# Patient Record
Sex: MALE | Race: WHITE | NOT HISPANIC OR LATINO | Employment: OTHER | URBAN - METROPOLITAN AREA
[De-identification: names, ages, dates, MRNs, and addresses within clinical notes are randomized per-mention and may not be internally consistent; named-entity substitution may affect disease eponyms.]

---

## 2018-02-16 ENCOUNTER — OFFICE VISIT CONVERTED (OUTPATIENT)
Dept: UROLOGY | Facility: CLINIC | Age: 58
End: 2018-02-16
Attending: UROLOGY

## 2018-02-16 ENCOUNTER — CONVERSION ENCOUNTER (OUTPATIENT)
Dept: SURGERY | Facility: CLINIC | Age: 58
End: 2018-02-16

## 2018-07-11 ENCOUNTER — OFFICE VISIT CONVERTED (OUTPATIENT)
Dept: CARDIOLOGY | Facility: CLINIC | Age: 58
End: 2018-07-11
Attending: INTERNAL MEDICINE

## 2019-02-15 ENCOUNTER — HOSPITAL ENCOUNTER (OUTPATIENT)
Dept: LAB | Facility: HOSPITAL | Age: 59
Discharge: HOME | End: 2019-02-15

## 2019-02-15 LAB
ALBUMIN SERPL-MCNC: 4 G/DL (ref 3.5–5)
ALBUMIN/GLOB SERPL: 1.3 {RATIO} (ref 1.4–2.6)
ALP SERPL-CCNC: 96 U/L (ref 56–155)
ALT SERPL-CCNC: 26 U/L (ref 10–40)
ANION GAP SERPL CALC-SCNC: 15 MMOL/L (ref 8–19)
AST SERPL-CCNC: 26 U/L (ref 15–50)
BASOPHILS # BLD AUTO: 0.07 10*3/UL (ref 0–0.2)
BASOPHILS NFR BLD AUTO: 1 % (ref 0–3)
BILIRUB SERPL-MCNC: 0.4 MG/DL (ref 0.2–1.3)
BUN SERPL-MCNC: 11 MG/DL (ref 5–25)
BUN/CREAT SERPL: 13 {RATIO} (ref 6–20)
CALCIUM SERPL-MCNC: 9.5 MG/DL (ref 8.7–10.4)
CHLORIDE SERPL-SCNC: 98 MMOL/L (ref 99–111)
CHOLEST SERPL-MCNC: 155 MG/DL (ref 107–200)
CHOLEST/HDLC SERPL: 3.5 {RATIO} (ref 3–6)
CONV ABS IMM GRAN: 0.01 10*3/UL (ref 0–0.2)
CONV CO2: 29 MMOL/L (ref 22–32)
CONV IMMATURE GRAN: 0.1 % (ref 0–1.8)
CONV TOTAL PROTEIN: 7.2 G/DL (ref 6.3–8.2)
CREAT UR-MCNC: 0.87 MG/DL (ref 0.7–1.2)
DEPRECATED RDW RBC AUTO: 43.7 FL (ref 35.1–43.9)
EOSINOPHIL # BLD AUTO: 0.16 10*3/UL (ref 0–0.7)
EOSINOPHIL # BLD AUTO: 2.3 % (ref 0–7)
ERYTHROCYTE [DISTWIDTH] IN BLOOD BY AUTOMATED COUNT: 13.8 % (ref 11.6–14.4)
GFR SERPLBLD BASED ON 1.73 SQ M-ARVRAT: >60 ML/MIN/{1.73_M2}
GLOBULIN UR ELPH-MCNC: 3.2 G/DL (ref 2–3.5)
GLUCOSE SERPL-MCNC: 99 MG/DL (ref 70–99)
HBA1C MFR BLD: 13.9 G/DL (ref 14–18)
HCT VFR BLD AUTO: 43 % (ref 42–52)
HDLC SERPL-MCNC: 44 MG/DL (ref 40–60)
LDLC SERPL CALC-MCNC: 102 MG/DL (ref 70–100)
LYMPHOCYTES # BLD AUTO: 1.46 10*3/UL (ref 1–5)
MCH RBC QN AUTO: 28.1 PG (ref 27–31)
MCHC RBC AUTO-ENTMCNC: 32.3 G/DL (ref 33–37)
MCV RBC AUTO: 87 FL (ref 80–96)
MONOCYTES # BLD AUTO: 0.46 10*3/UL (ref 0.2–1.2)
MONOCYTES NFR BLD AUTO: 6.6 % (ref 3–10)
NEUTROPHILS # BLD AUTO: 4.84 10*3/UL (ref 2–8)
NEUTROPHILS NFR BLD AUTO: 69.1 % (ref 30–85)
NRBC CBCN: 0 % (ref 0–0.7)
OSMOLALITY SERPL CALC.SUM OF ELEC: 285 MOSM/KG (ref 273–304)
PLATELET # BLD AUTO: 228 10*3/UL (ref 130–400)
PMV BLD AUTO: 10 FL (ref 9.4–12.4)
POTASSIUM SERPL-SCNC: 4.4 MMOL/L (ref 3.5–5.3)
PSA SERPL-MCNC: 0.73 NG/ML (ref 0–4)
RBC # BLD AUTO: 4.94 10*6/UL (ref 4.7–6.1)
SODIUM SERPL-SCNC: 138 MMOL/L (ref 135–147)
T4 FREE SERPL-MCNC: 1.4 NG/DL (ref 0.9–1.8)
TRIGL SERPL-MCNC: 47 MG/DL (ref 40–150)
TSH SERPL-ACNC: 2.63 M[IU]/L (ref 0.27–4.2)
VARIANT LYMPHS NFR BLD MANUAL: 20.9 % (ref 20–45)
VLDLC SERPL-MCNC: 9 MG/DL (ref 5–37)
WBC # BLD AUTO: 7 10*3/UL (ref 4.8–10.8)

## 2019-02-22 ENCOUNTER — OFFICE VISIT CONVERTED (OUTPATIENT)
Dept: SURGERY | Facility: CLINIC | Age: 59
End: 2019-02-22
Attending: PHYSICIAN ASSISTANT

## 2019-07-11 ENCOUNTER — HOSPITAL ENCOUNTER (OUTPATIENT)
Dept: LAB | Facility: HOSPITAL | Age: 59
Discharge: HOME | End: 2019-07-11
Attending: INTERNAL MEDICINE

## 2019-07-11 LAB
ALBUMIN SERPL-MCNC: 3.8 G/DL (ref 3.5–5)
ALBUMIN/GLOB SERPL: 1.1 {RATIO} (ref 1.4–2.6)
ALP SERPL-CCNC: 106 U/L (ref 56–155)
ALT SERPL-CCNC: 27 U/L (ref 10–40)
ANION GAP SERPL CALC-SCNC: 19 MMOL/L (ref 8–19)
AST SERPL-CCNC: 24 U/L (ref 15–50)
BILIRUB SERPL-MCNC: 0.45 MG/DL (ref 0.2–1.3)
BUN SERPL-MCNC: 13 MG/DL (ref 5–25)
BUN/CREAT SERPL: 14 {RATIO} (ref 6–20)
CALCIUM SERPL-MCNC: 9.4 MG/DL (ref 8.7–10.4)
CHLORIDE SERPL-SCNC: 100 MMOL/L (ref 99–111)
CHOLEST SERPL-MCNC: 138 MG/DL (ref 107–200)
CHOLEST/HDLC SERPL: 3.7 {RATIO} (ref 3–6)
CONV CO2: 27 MMOL/L (ref 22–32)
CONV TOTAL PROTEIN: 7.3 G/DL (ref 6.3–8.2)
CREAT UR-MCNC: 0.96 MG/DL (ref 0.7–1.2)
GFR SERPLBLD BASED ON 1.73 SQ M-ARVRAT: >60 ML/MIN/{1.73_M2}
GLOBULIN UR ELPH-MCNC: 3.5 G/DL (ref 2–3.5)
GLUCOSE SERPL-MCNC: 96 MG/DL (ref 70–99)
HDLC SERPL-MCNC: 37 MG/DL (ref 40–60)
LDLC SERPL CALC-MCNC: 90 MG/DL (ref 70–100)
OSMOLALITY SERPL CALC.SUM OF ELEC: 294 MOSM/KG (ref 273–304)
POTASSIUM SERPL-SCNC: 4.2 MMOL/L (ref 3.5–5.3)
SODIUM SERPL-SCNC: 142 MMOL/L (ref 135–147)
TRIGL SERPL-MCNC: 56 MG/DL (ref 40–150)
VLDLC SERPL-MCNC: 11 MG/DL (ref 5–37)

## 2019-07-15 ENCOUNTER — OFFICE VISIT CONVERTED (OUTPATIENT)
Dept: CARDIOLOGY | Facility: CLINIC | Age: 59
End: 2019-07-15
Attending: NURSE PRACTITIONER

## 2019-08-29 ENCOUNTER — HOSPITAL ENCOUNTER (OUTPATIENT)
Dept: RADIATION ONCOLOGY | Facility: HOSPITAL | Age: 59
Discharge: HOME | End: 2019-08-29
Attending: RADIOLOGY

## 2019-08-29 LAB — PSA SERPL-MCNC: 0.84 NG/ML (ref 0–4)

## 2019-09-16 ENCOUNTER — HOSPITAL ENCOUNTER (OUTPATIENT)
Dept: LAB | Facility: HOSPITAL | Age: 59
Discharge: HOME | End: 2019-09-16

## 2019-09-16 LAB
T4 FREE SERPL-MCNC: 1.3 NG/DL (ref 0.9–1.8)
TSH SERPL-ACNC: 2.82 M[IU]/L (ref 0.27–4.2)

## 2019-12-13 ENCOUNTER — HOSPITAL ENCOUNTER (OUTPATIENT)
Dept: LAB | Facility: HOSPITAL | Age: 59
Discharge: HOME | End: 2019-12-13

## 2019-12-13 LAB
ALT SERPL-CCNC: 26 U/L (ref 10–40)
ANION GAP SERPL CALC-SCNC: 16 MMOL/L (ref 8–19)
AST SERPL-CCNC: 30 U/L (ref 15–50)
BUN SERPL-MCNC: 13 MG/DL (ref 5–25)
BUN/CREAT SERPL: 16 {RATIO} (ref 6–20)
CALCIUM SERPL-MCNC: 9.3 MG/DL (ref 8.7–10.4)
CHLORIDE SERPL-SCNC: 96 MMOL/L (ref 99–111)
CHOLEST SERPL-MCNC: 142 MG/DL (ref 107–200)
CHOLEST/HDLC SERPL: 2.9 {RATIO} (ref 3–6)
CONV CO2: 28 MMOL/L (ref 22–32)
CREAT UR-MCNC: 0.83 MG/DL (ref 0.7–1.2)
GFR SERPLBLD BASED ON 1.73 SQ M-ARVRAT: >60 ML/MIN/{1.73_M2}
GLUCOSE SERPL-MCNC: 88 MG/DL (ref 70–99)
HDLC SERPL-MCNC: 49 MG/DL (ref 40–60)
LDLC SERPL CALC-MCNC: 86 MG/DL (ref 70–100)
OSMOLALITY SERPL CALC.SUM OF ELEC: 282 MOSM/KG (ref 273–304)
POTASSIUM SERPL-SCNC: 3.9 MMOL/L (ref 3.5–5.3)
SODIUM SERPL-SCNC: 136 MMOL/L (ref 135–147)
T4 FREE SERPL-MCNC: 1.4 NG/DL (ref 0.9–1.8)
TRIGL SERPL-MCNC: 37 MG/DL (ref 40–150)
TSH SERPL-ACNC: 3.11 M[IU]/L (ref 0.27–4.2)
VLDLC SERPL-MCNC: 7 MG/DL (ref 5–37)

## 2020-01-13 ENCOUNTER — HOSPITAL ENCOUNTER (OUTPATIENT)
Dept: LAB | Facility: HOSPITAL | Age: 60
Discharge: HOME | End: 2020-01-13
Attending: RADIOLOGY

## 2020-01-13 LAB — PSA SERPL-MCNC: 0.86 NG/ML (ref 0–4)

## 2020-02-28 ENCOUNTER — OFFICE VISIT CONVERTED (OUTPATIENT)
Dept: SURGERY | Facility: CLINIC | Age: 60
End: 2020-02-28
Attending: PHYSICIAN ASSISTANT

## 2020-02-28 ENCOUNTER — CONVERSION ENCOUNTER (OUTPATIENT)
Dept: SURGERY | Facility: CLINIC | Age: 60
End: 2020-02-28

## 2020-06-04 ENCOUNTER — OFFICE VISIT CONVERTED (OUTPATIENT)
Dept: FAMILY MEDICINE CLINIC | Facility: CLINIC | Age: 60
End: 2020-06-04
Attending: FAMILY MEDICINE

## 2020-08-10 ENCOUNTER — HOSPITAL ENCOUNTER (OUTPATIENT)
Dept: LAB | Facility: HOSPITAL | Age: 60
Discharge: HOME | End: 2020-08-10
Attending: NURSE PRACTITIONER

## 2020-08-10 LAB
25(OH)D3 SERPL-MCNC: 33.2 NG/ML (ref 30–100)
ALBUMIN SERPL-MCNC: 3.7 G/DL (ref 3.5–5)
ALBUMIN/GLOB SERPL: 1.1 {RATIO} (ref 1.4–2.6)
ALP SERPL-CCNC: 109 U/L (ref 56–155)
ALT SERPL-CCNC: 29 U/L (ref 10–40)
ANION GAP SERPL CALC-SCNC: 23 MMOL/L (ref 8–19)
AST SERPL-CCNC: 30 U/L (ref 15–50)
BASOPHILS # BLD AUTO: 0.07 10*3/UL (ref 0–0.2)
BASOPHILS NFR BLD AUTO: 1 % (ref 0–3)
BILIRUB SERPL-MCNC: 0.37 MG/DL (ref 0.2–1.3)
BUN SERPL-MCNC: 11 MG/DL (ref 5–25)
BUN/CREAT SERPL: 11 {RATIO} (ref 6–20)
CALCIUM SERPL-MCNC: 10 MG/DL (ref 8.7–10.4)
CHLORIDE SERPL-SCNC: 100 MMOL/L (ref 99–111)
CHOLEST SERPL-MCNC: 147 MG/DL (ref 107–200)
CHOLEST/HDLC SERPL: 4 {RATIO} (ref 3–6)
CONV ABS IMM GRAN: 0.01 10*3/UL (ref 0–0.2)
CONV CO2: 22 MMOL/L (ref 22–32)
CONV IMMATURE GRAN: 0.1 % (ref 0–1.8)
CONV TOTAL PROTEIN: 7.2 G/DL (ref 6.3–8.2)
CREAT UR-MCNC: 0.96 MG/DL (ref 0.7–1.2)
DEPRECATED RDW RBC AUTO: 45.4 FL (ref 35.1–43.9)
EOSINOPHIL # BLD AUTO: 0.41 10*3/UL (ref 0–0.7)
EOSINOPHIL # BLD AUTO: 6.1 % (ref 0–7)
ERYTHROCYTE [DISTWIDTH] IN BLOOD BY AUTOMATED COUNT: 14.3 % (ref 11.6–14.4)
FOLATE SERPL-MCNC: 17.5 NG/ML (ref 4.8–20)
GFR SERPLBLD BASED ON 1.73 SQ M-ARVRAT: >60 ML/MIN/{1.73_M2}
GLOBULIN UR ELPH-MCNC: 3.5 G/DL (ref 2–3.5)
GLUCOSE SERPL-MCNC: 95 MG/DL (ref 70–99)
HCT VFR BLD AUTO: 43.7 % (ref 42–52)
HDLC SERPL-MCNC: 37 MG/DL (ref 40–60)
HGB BLD-MCNC: 13.6 G/DL (ref 14–18)
IRON SATN MFR SERPL: 15 % (ref 20–55)
IRON SERPL-MCNC: 41 UG/DL (ref 70–180)
LDLC SERPL CALC-MCNC: 99 MG/DL (ref 70–100)
LYMPHOCYTES # BLD AUTO: 1.55 10*3/UL (ref 1–5)
LYMPHOCYTES NFR BLD AUTO: 23.2 % (ref 20–45)
MCH RBC QN AUTO: 27.5 PG (ref 27–31)
MCHC RBC AUTO-ENTMCNC: 31.1 G/DL (ref 33–37)
MCV RBC AUTO: 88.5 FL (ref 80–96)
MONOCYTES # BLD AUTO: 0.51 10*3/UL (ref 0.2–1.2)
MONOCYTES NFR BLD AUTO: 7.6 % (ref 3–10)
NEUTROPHILS # BLD AUTO: 4.14 10*3/UL (ref 2–8)
NEUTROPHILS NFR BLD AUTO: 62 % (ref 30–85)
NRBC CBCN: 0 % (ref 0–0.7)
OSMOLALITY SERPL CALC.SUM OF ELEC: 291 MOSM/KG (ref 273–304)
PLATELET # BLD AUTO: 205 10*3/UL (ref 130–400)
PMV BLD AUTO: 11.3 FL (ref 9.4–12.4)
POTASSIUM SERPL-SCNC: 4.4 MMOL/L (ref 3.5–5.3)
RBC # BLD AUTO: 4.94 10*6/UL (ref 4.7–6.1)
SODIUM SERPL-SCNC: 141 MMOL/L (ref 135–147)
T4 FREE SERPL-MCNC: 1.3 NG/DL (ref 0.9–1.8)
TIBC SERPL-MCNC: 276 UG/DL (ref 245–450)
TRANSFERRIN SERPL-MCNC: 193 MG/DL (ref 215–365)
TRIGL SERPL-MCNC: 54 MG/DL (ref 40–150)
TSH SERPL-ACNC: 3.06 M[IU]/L (ref 0.27–4.2)
VIT B12 SERPL-MCNC: 612 PG/ML (ref 211–911)
VLDLC SERPL-MCNC: 11 MG/DL (ref 5–37)
WBC # BLD AUTO: 6.69 10*3/UL (ref 4.8–10.8)

## 2020-08-17 ENCOUNTER — OFFICE VISIT CONVERTED (OUTPATIENT)
Dept: CARDIOLOGY | Facility: CLINIC | Age: 60
End: 2020-08-17
Attending: INTERNAL MEDICINE

## 2020-08-17 ENCOUNTER — CONVERSION ENCOUNTER (OUTPATIENT)
Dept: CARDIOLOGY | Facility: CLINIC | Age: 60
End: 2020-08-17

## 2020-09-03 ENCOUNTER — HOSPITAL ENCOUNTER (OUTPATIENT)
Dept: RADIATION ONCOLOGY | Facility: HOSPITAL | Age: 60
Discharge: HOME | End: 2020-09-03
Attending: RADIOLOGY

## 2020-09-03 LAB — PSA SERPL-MCNC: 1.2 NG/ML (ref 0–4)

## 2020-12-04 ENCOUNTER — CONVERSION ENCOUNTER (OUTPATIENT)
Dept: FAMILY MEDICINE CLINIC | Facility: CLINIC | Age: 60
End: 2020-12-04

## 2020-12-04 ENCOUNTER — HOSPITAL ENCOUNTER (OUTPATIENT)
Dept: GENERAL RADIOLOGY | Facility: HOSPITAL | Age: 60
Discharge: HOME | End: 2020-12-04
Attending: FAMILY MEDICINE

## 2020-12-04 ENCOUNTER — OFFICE VISIT CONVERTED (OUTPATIENT)
Dept: FAMILY MEDICINE CLINIC | Facility: CLINIC | Age: 60
End: 2020-12-04
Attending: FAMILY MEDICINE

## 2020-12-04 LAB
BASOPHILS # BLD AUTO: 0.08 10*3/UL (ref 0–0.2)
BASOPHILS # BLD: 4 % (ref 0–3)
BASOPHILS NFR BLD AUTO: 1.2 % (ref 0–3)
CONV ABS BANDS: 2 % (ref 1–5)
CONV ABS IMM GRAN: 0.02 10*3/UL (ref 0–0.2)
CONV ANISOCYTES: SLIGHT
CONV ATYPICAL LYMPHOCYTES: 1 % (ref 0–5)
CONV HYPOCHROMIA IN BLOOD BY LIGHT MICROSCOPY: SLIGHT
CONV IMMATURE GRAN: 0.3 % (ref 0–1.8)
CONV SEGMENTED NEUTROPHILS: 59 % (ref 45–70)
DEPRECATED RDW RBC AUTO: 44.1 FL (ref 35.1–43.9)
EOSINOPHIL # BLD AUTO: 0.51 10*3/UL (ref 0–0.7)
EOSINOPHIL # BLD AUTO: 7.8 % (ref 0–7)
EOSINOPHIL NFR BLD AUTO: 9 % (ref 0–7)
ERYTHROCYTE [DISTWIDTH] IN BLOOD BY AUTOMATED COUNT: 14 % (ref 11.6–14.4)
FERRITIN SERPL-MCNC: 179 NG/ML (ref 30–300)
HCT VFR BLD AUTO: 44.6 % (ref 42–52)
HGB BLD-MCNC: 14 G/DL (ref 14–18)
IRON SATN MFR SERPL: 13 % (ref 20–55)
IRON SERPL-MCNC: 43 UG/DL (ref 70–180)
LDH SERPL-CCNC: 318 U/L (ref 120–240)
LYMPHOCYTES # BLD AUTO: 1.7 10*3/UL (ref 1–5)
LYMPHOCYTES NFR BLD AUTO: 25.9 % (ref 20–45)
MCH RBC QN AUTO: 27.1 PG (ref 27–31)
MCHC RBC AUTO-ENTMCNC: 31.4 G/DL (ref 33–37)
MCV RBC AUTO: 86.3 FL (ref 80–96)
MICROCYTES BLD QL: SLIGHT
MONOCYTES # BLD AUTO: 0.57 10*3/UL (ref 0.2–1.2)
MONOCYTES NFR BLD AUTO: 8.7 % (ref 3–10)
MONOCYTES NFR BLD MANUAL: 3 % (ref 3–10)
NEUTROPHILS # BLD AUTO: 3.69 10*3/UL (ref 2–8)
NEUTROPHILS NFR BLD AUTO: 56.1 % (ref 30–85)
NRBC CBCN: 0 % (ref 0–0.7)
NUC CELL # PRT MANUAL: 0 /100{WBCS}
OVALOCYTES BLD QL SMEAR: ABNORMAL
PLAT MORPH BLD: NORMAL
PLATELET # BLD AUTO: 250 10*3/UL (ref 130–400)
PMV BLD AUTO: 9.9 FL (ref 9.4–12.4)
RBC # BLD AUTO: 5.17 10*6/UL (ref 4.7–6.1)
SMALL PLATELETS BLD QL SMEAR: ADEQUATE
T4 FREE SERPL-MCNC: 1.3 NG/DL (ref 0.9–1.8)
TIBC SERPL-MCNC: 332 UG/DL (ref 245–450)
TOXIC GRANULATION: ABNORMAL
TRANSFERRIN SERPL-MCNC: 232 MG/DL (ref 215–365)
TSH SERPL-ACNC: 3.85 M[IU]/L (ref 0.27–4.2)
VARIANT LYMPHS NFR BLD MANUAL: 22 % (ref 20–45)
WBC # BLD AUTO: 6.57 10*3/UL (ref 4.8–10.8)

## 2020-12-05 LAB — HAPTOGLOB SERPL-MCNC: 195 MG/DL (ref 32–363)

## 2021-05-10 NOTE — H&P
History and Physical      Patient Name: Colton Chiang   Patient ID: [id number]   Sex: Male   YOB: 1960    Primary Care Provider: Eduarda Butt DO   Referring Provider: Eduarda Butt DO    Visit Date: June 4, 2020    Provider: Eduarda Butt DO   Location: [facility]   Location Address: [address] [place] [address] [address]  [place], [place]  [id number]   Location Phone: [phone]          Chief Complaint  · Establish Care  · Medication refills      History Of Present Illness  Colton Chiang is a 60 year old /White male who presents for evaluation and treatment of:      The patient is here today to establish relations as new patient.  His past PCP was Nuha Adams. He is .  He has a past medical history significant for tobacco abuse, prostate cancer, erectile dysfunction, hypothyroidism, depression, history of CVA, TB positive test, arthritis, seasonal allergies, morbid obesity, Congestive heart failure hypertension, hyperlipidemia. He has a family history of breast cancer.    He had a history of elevated thyroid levels and took the radioactive iodine     Dr Maria is his cardiologist.     He had radiation on his prostate in 2008.     He smokes 1 1/2 packs of cigarettes a day, has smoked for many years and is not interested in stopping.    labs 2/15/2019: Hemoglobin 13.9, MCV 87, platelets 228, potassium 3.9, creatinine 0.83, GFR greater than 60, liver enzymes within normal limits, TSH 3.1, free T4 1.4.    His only complaint today is that he feels fatigued.     He has no other complaints today and denies chest pain, shortness of breath, weakness, numbness, nausea, vomiting, diarrhea, dizziness or syncopal event.       Past Medical History  Disease Name Date Onset Notes   Allergies --  --    Arthritis --  --    Blood disease --  --    Broken Bones --  cracked   chronic back pain --  --    Congestive heart failure 1990?? --    Deafness, bilateral --  --    Depression --  --     Heart Disease --  --    High blood pressure --  --    High cholesterol --  --    Hypothyroid 02/10/2017 --    Personal Hx Prostate Cancer 02/05/2016 --    Prostate cancer --  --    Prostate cancer --  --    Radiation Therapy --  --    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, bilateral --  --    Stroke --  --    Thyroid disorder --  --    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 --  --          Past Surgical History  Procedure Name Date Notes   Colonoscopy --  --    Prostate Biopsy --  --          Medication List  Name Date Started Instructions   aspirin 81 mg oral tablet,delayed release (DR/EC)  take 1 tablet (81 mg) by oral route once daily   atorvastatin 10 mg oral tablet  take 1 tablet (10 mg) by oral route once daily at bedtime   fexofenadine 180 mg oral tablet  take 1 tablet (180 mg) by oral route once daily   furosemide 40 mg oral tablet 08/04/2020 take 1 tablet (40 mg) by oral route every other day and alternate 0.5 tablet (20 mg) every other day   iron 325 mg (65 mg iron) oral tablet 08/12/2020 take 1 tablet (325 mg) by oral route once daily for 12 weeks   levothyroxine 125 mcg oral tablet 07/04/2020 TAKE 1 TABLET(125 MCG) BY MOUTH EVERY DAY 30 MINUTES BEFORE BREAKFAST ON AN EMPTY STOMACH   liothyronine 5 mcg oral tablet 06/08/2020 TAKE 2 TABLETS BY MOUTH EVERY DAY   metoprolol succinate 25 mg oral tablet extended release 24 hr 08/03/2020 TAKE 1/2 TABLET DAILY   multivitamin oral tablet  take 1 tablet by oral route daily   Vitamin D3 25 mcg (1,000 unit) oral capsule  take 1 capsule by oral route daily         Allergy List  Allergen Name Date Reaction Notes   PENICILLINS --  --  --        Allergies Reconciled  Family Medical History  Disease Name Relative/Age Notes   Family history of breast cancer Sister/   --    Family history of heart disease  --    Family history of diabetes mellitus  --          Social History  Finding Status Start/Stop Quantity Notes   Active but no formal exercise --  --/-- --  --    Alcohol Never --/-- --   "06/04/2020 -    Horowitz --  --/-- --  --     --  --/-- --  --    Tobacco Current every day --/-- 1.5 ppd --     --  --/-- --  --          Review of Systems  · Constitutional  o Admits  o : fatigue  o Denies  o : fever, weight loss, weight gain  · HENT  o Denies  o : headaches  · Cardiovascular  o Denies  o : pedal edema, claudication, chest pressure, palpitations  · Respiratory  o Denies  o : shortness of breath, wheezing, cough, hemoptysis, dyspnea on exertion  · Gastrointestinal  o Denies  o : nausea, vomiting, diarrhea, constipation, abdominal pain  · Genitourinary  o Denies  o : urgency, frequency  · Integument  o Denies  o : rash, itching  · Neurologic  o Denies  o : altered mental status, muscular weakness  · Musculoskeletal  o Admits  o : joint pain  o Denies  o : joint swelling, limitation of motion  · Endocrine  o Denies  o : polyuria, polydipsia  · Psychiatric  o Denies  o : anxiety, depression, suicidal ideation  · Heme-Lymph  o Denies  o : easy bleeding, easy bruising, edema, lymph node enlargement or tenderness      Vitals  Date Time BP Position Site L\R Cuff Size HR RR TEMP (F) WT  HT  BMI kg/m2 BSA m2 O2 Sat        06/04/2020 08:58 /60 Sitting    65 - R 20 98.5 223lbs 16oz 5'  11\" 31.24 2.26 96 %          Physical Examination  · Constitutional  o Appearance  o : obese, well developed, alert, NAD  · Head and Face  o Head  o :   § Inspection  § : atraumatic, normocephalic  · Eyes  o Eyes  o : extraocular movements intact, no scleral icterus, no conjunctival injection  · Ears, Nose, Mouth and Throat  o Nose  o :   § Intranasal Exam  § : nares patent  o Oral Cavity  o :   § Oral Mucosa  § : moist mucous membranes  · Respiratory  o Respiratory  o : breathing comfortably, symmetric chest rise, clear to asculatation bilaterally, no wheezes, rales, or rhonchii  · Cardiovascular  o Heart  o :   § Auscultation of Heart  § : regular rate and rhythm, no murmurs, rubs, or " gallops  o Peripheral Vascular System  o :   § Extremities  § : no edema  · Skin and Subcutaneous Tissue  o General Inspection  o : no rashes present, no lesions present, no areas of discoloration, skin turgor normal  · Neurologic  o Cranial Nerves  o : crainial nerves 2-12 grossly intact  o Gait and Station  o :   § Gait Screening  § : normal gait  · Psychiatric  o General  o : normal mood and affect      Figure 1.0: Pain Rating Scale-[place]         Assessment  · Essential hypertension     401.9/I10  · Fatigue     780.79/R53.83  · Hyperlipidemia     272.4/E78.5  · Hypothyroidism     244.9/E03.9  · Tobacco abuse counseling       Tobacco abuse counseling     V65.42/Z71.6  · Screening for depression     V79.0/Z13.89  · Establishing care with new doctor, encounter for     V65.8/Z76.89  · History of anemia     V12.3/Z86.2  · Adult BMI 31.0-31.9 kg/sq m     V85.31/Z68.31      Plan  · Orders  o ACO-17: Screened for tobacco use AND received tobacco cessation intervention (4004F) - V65.42/Z71.6 - 06/04/2020  o ACO-18: Positive screen for clinical depression using a standardized tool and a follow-up plan documented () - V79.0/Z13.89 - 06/04/2020  o CBC with Auto Diff [facility] (24558) - 780.79/R53.83 - 06/04/2020  o CMP [facility] (22774) - 780.79/R53.83 - 06/04/2020  o Lipid Panel [facility] (28671) - 272.4/E78.5 - 06/04/2020  o Thyroid Profile (26005, 10196, THYII) - 244.9/E03.9, 780.79/R53.83 - 06/04/2020  o Vitamin D (25-Hydroxy) Level (96834) - 780.79/R53.83 - 06/04/2020  o ACO-39: Current medications updated and reviewed () - - 06/04/2020  o ACO-18: Positive screen for clinical depression using a standardized tool and a follow-up plan documented () - - 06/04/2020  o B12 Folate levels (B12FO) - 780.79/R53.83, V12.3/Z86.2 - 06/04/2020  o Iron Profile (Iron 84407 TIBC 00830 and Transferrin 08514) (IRONP) - 780.79/R53.83, V12.3/Z86.2 - 06/04/2020  · Medications  o levothyroxine 125 mcg oral tablet   SIG: take 1 tablet (125 mcg)  by oral route once daily on an empty stomach 30 minutes before breakfast for 30 days   DISP: (30) tablet with 0 refills  Prescribed on 06/04/2020     o Medications have been Reconciled  o Transition of Care or Provider Policy  · Instructions  o Advised that cheeses and other sources of dairy fats, animal fats, fast food, and the extras (candy, pastries, pies, doughnuts and cookies) all contain LDL raising nutrients. Advised to increase fruits, vegetables, whole grains, and to monitor portion sizes.   o Tobacco and smoking cessation counseling for more than 3 minutes was completed.  o Depression Screen completed and scanned into the EMR under the designated folder within the patient's documents.  o Today's PHQ-9 result is __8_  o Patient was educated/instructed on their diagnosis, treatment and medications prior to discharge from the clinic today.  · Disposition  o Follow up in 6 months     1.  Fatigue: The patient was given lab order today for a CBC, CMP, B12, folate, thyroid profile and's and CMP to be managed [place] findings.  2.  Tobacco abuse: Smoking cessation was addressed but patient refuses to consider stopping smoking.  3.  Obesity: The patient has lost several pounds over the past few years.  Weight loss was encouraged today.  4.  Hypothyroidism: The patient's most recent thyroid profile was normal.  He will be continued on thyroid replacement thyroid profile was ordered today.  5.  History of anemia: Lab orders placed for CBC, B12, folate and iron profile to be managed according findings.  6.  Hypertension: The patient's blood pressure is very well controlled at this time.  7.  Hyperlipidemia: The patient is currently on fenofibrate and atorvastatin.  He was given a order today for a lipid profile to be managed on findings.  Follow-up 6 months.             Electronically Signed by: Eduarda Butt DO -Author on August 19, 2020 09:58:21 PM

## 2021-05-13 NOTE — PROGRESS NOTES
Progress Note      Patient Name: Colton Chiang   Patient ID: [id number]   Sex: Male   YOB: 1960    Primary Care Provider: Eduarda Butt DO   Referring Provider: Eduarda Butt DO    Visit Date: August 17, 2020    Provider: Diane Dickson MD   Location: [facility]   Location Address: [address] [place] [address], [facility]   [place]  [id number]   Location Phone: [phone]          Chief Complaint     Follow up on hypertension and hyperlipidemia.       History Of Present Illness  REFERRING PROVIDER: Eduarda Butt DO   Colton Chiang is a 60 year old /White male who denies any chest pain or pressure. No palpitations, shortness of breath, swelling, dizziness, syncope, PND, or orthopnea. Cardiac-wise, he is doing very well. He admits he smokes a pack a day and has no desire to stop. He has gained a couple of pounds since his last visit.   PAST MEDICAL HISTORY: Hyperlipidemia; hypertension; Hypothyroidism; Nicotine dependence.   FAMILY HISTORY: Negative for diabetes mellitus, hypertension, or heart disease.   PSYCHOSOCIAL HISTORY: Current smoker of one pack per day. Denies alcohol use. Denies mood changes or depression.   CURRENT MEDICATIONS: Levothyroxine 0.125 mg 2 tabs daily; liothyronine 5 mcg 2 tabs daily; furosemide 40 mg 1 tab one day, alternating with 1/2 tab the next; multivitamin; fexofenadine 180 mg 1/2 tab daily; vitamin D 50 mcg b.i.d.; aspirin 81 mg daily; atorvastatin 10 mg daily; metoprolol succinate ER 25 mg 1/2 tab daily; ferrous sulfate 325 mg daily; supplement for his restless leg.       Review of Systems  · Cardiovascular  o Denies  o : palpitations (fast, fluttering, or skipping beats), swelling (feet, ankles, hands), shortness of breath while walking or lying flat, chest pain or angina pectoris   · Respiratory  o Admits  o : chronic or frequent cough  o Denies  o : asthma or wheezing      Vitals  Date Time BP Position Site L\R Cuff Size HR RR TEMP (F) WT  HT   "BMI kg/m2 BSA m2 O2 Sat        08/17/2020 08:09 /66 Sitting    66 - R   223lbs 16oz 5'  10\" 32.14 2.24           Physical Examination  · Constitutional  o Appearance  o : Awake, alert, in no acute distress.  · Respiratory  o Respiratory  o : Increased AP diameter with diminished breath sounds. Prolonged expiration. Negative rales or rhonchi.   · Cardiovascular  o Heart  o : PMI not well felt. Heart sounds are distant. S1, S2 regular. No S3. No S4. Negative systolic/diastolic murmur.   o Peripheral Vascular System  o :   § Extremities  § : Good femoral and pedal pulses. No pedal edema.  · Gastrointestinal  o Abdominal Examination  o : Soft. No tenderness or masses felt. No hepatosplenomegaly. Abdominal aorta is not palpable.  · Labs  o Labs  o : Blood sugar 95. Total cholesterol 147, triglycerides 54, HDL still low at 37 but that is his norm, LDL 99. Thyroid was normal.           Assessment     1.  Hypertension, controlled.  2.  Hyperlipidemia with LDL at goal for him.  3.  Hypothyroidism.  4.  Nicotine dependence.       Plan     1.  Smoking cessation counseling was performed.  He declined any aids.  2.  Strongly encouraged him to use his mask when he is out in public because he is high-risk for COVID.  3.  Follow up in 1 year with labs or earlier if needed.      PRIMO Ryan/Diane Dickson MD, [facility]  JF:PM:vm               Electronically Signed by: Augusta Porter-, Other -Author on August 19, 2020 06:27:08 AM  Electronically Co-signed by: PRIMO Capellan -Reviewer on August 20, 2020 11:08:24 AM  Electronically Co-signed by: Diane Dickson MD -Reviewer on August 22, 2020 02:55:06 PM  "

## 2021-05-13 NOTE — PROGRESS NOTES
Progress Note      Patient Name: Colton Chiang   Patient ID: [id number]   Sex: Male   YOB: 1960    Primary Care Provider: Eduarda Butt DO   Referring Provider: Eduarda Butt DO    Visit Date: December 4, 2020    Provider: Eduarda Butt DO   Location: [facility]   Location Address: [address] [place] [address]  [id number]   Location Phone: [phone]          Chief Complaint  · 6 mo follow up       History Of Present Illness  Colton Chiang is a 60 year old /White male who presents for evaluation and treatment of:      The patient is here today to establish relations as new patient.  He is  with one daughter.  He has a past medical history significant for tobacco abuse, prostate cancer, erectile dysfunction, hypothyroidism, depression, history of CVA, TB positive test, arthritis, seasonal allergies, morbid obesity, Congestive heart failure hypertension, hyperlipidemia. He has a family history of breast cancer.    Dr Maria is his cardiologist.     He had radiation on his prostate in 2008.     He smokes 1-1/2 packs of cigarettes a day, has smoked for many years and is not interested in stopping.    He is not current on his screening Colonoscopy and is not interested in one today. Will do cologuard today.     He declines both Flu/pneumonia vaccinations.    Labs 8/10/2020: Hemoglobin 13.6, MCV 88.5, platelets 205, B12 612, folic acid 17.5, vitamin D 33.2, free T4 1.3, TSH 3.06, triglyceride 54, HDL 37, LDL 99.    labs 2/15/2019: Hemoglobin 13.9, MCV 87, platelets 228, potassium 3.9, creatinine 0.83, GFR greater than 60, liver enzymes within normal limits, TSH 3.1, free T4 1.4.    His only complaint today is that he feels fatigued which is not new for him.     He has no other complaints today and denies chest pain, shortness of breath, weakness, numbness, nausea, vomiting, diarrhea, dizziness or syncopal event            Past Medical History  Disease Name Date  Onset Notes   Allergies --  --    Arthritis --  --    Blood disease --  --    Broken Bones --  cracked   chronic back pain --  --    Congestive heart failure 1990?? --    Deafness, bilateral --  --    Depression --  --    Heart Disease --  --    High blood pressure --  --    High cholesterol --  --    Hypothyroid 02/10/2017 --    Personal Hx Prostate Cancer 02/05/2016 --    Prostate cancer --  --    Prostate cancer --  --    Radiation Therapy --  --    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, bilateral --  --    Stroke --  --    Thyroid disorder --  --    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 --  --          Past Surgical History  Procedure Name Date Notes   Colonoscopy --  --    Prostate Biopsy --  --          Medication List  Name Date Started Instructions   aspirin 81 mg oral tablet,delayed release (DR/EC) 12/04/2020 take 1 tablet (81 mg) by oral route once daily for 90 days   atorvastatin 10 mg oral tablet 11/06/2020 TAKE 1 TABLET DAILY   diphenhydramine HCl 25 mg oral capsule  take 1/2 tablet once daily.   fexofenadine 180 mg oral tablet  take 1 tablet (180 mg) by oral route once daily   furosemide 40 mg oral tablet 11/06/2020 TAKE 1 TABLET EVERY OTHER DAY AND ALTERNATE WITH 1/2 TABLET (20MG) EVERY OTHER DAY   iron 325 mg (65 mg iron) oral tablet 12/04/2020 take 1 tablet (325 mg) by oral route once daily for 12 weeks   levothyroxine 125 mcg oral tablet 12/04/2020 TAKE 1 TABLET(125 MCG) BY MOUTH EVERY DAY 30 MINUTES BEFORE BREAKFAST ON AN EMPTY STOMACH for 90 days   liothyronine 5 mcg oral tablet 12/04/2020 TAKE 2 TABLETS BY MOUTH EVERY DAY for 90 days   metoprolol succinate 25 mg oral tablet extended release 24 hr 11/06/2020 TAKE 1/2 TABLET DAILY   multivitamin oral tablet  take 1 tablet by oral route daily   Vitamin D3 50 mcg (2,000 unit) oral capsule 12/04/2020 take 1 capsule by oral route once         Allergy List  Allergen Name Date Reaction Notes   PENICILLINS --  --  --          Family Medical History  Disease Name Relative/Age  "Notes   Family history of breast cancer Sister/   --    Family history of heart disease  --    Family history of diabetes mellitus  --          Social History  Finding Status Start/Stop Quantity Notes   Active but no formal exercise --  --/-- --  --    Alcohol Never --/-- --  06/04/2020 -    Horowitz --  --/-- --  --     --  --/-- --  --    Tobacco Current every day --/-- 1.5 ppd --     --  --/-- --  --          Review of Systems  · Constitutional  o * See HPI  · HENT  o * See HPI  · Cardiovascular  o * See HPI  · Respiratory  o * See HPI  · Gastrointestinal  o * See HPI  · Genitourinary  o * See HPI  · Neurologic  o * See HPI  · Musculoskeletal  o * See HPI      Vitals  Date Time BP Position Site L\R Cuff Size HR RR TEMP (F) WT  HT  BMI kg/m2 BSA m2 O2 Sat FR L/min FiO2 HC       12/04/2020 08:03 /61 Sitting    65 - R 18 97.5 228lbs 8oz 5'  11\" 31.87 2.28 97 %  21%          Physical Examination  · Constitutional  o Appearance  o : obese, well developed, alert, no obvious deformities present  · Eyes  o Eyes  o : extraocular movements intact, no scleral icterus, no conjunctival injection  · Respiratory  o Respiratory Effort  o : breathing comfortably, symmetric chest rise  o Auscultation of Lungs  o : clear to asculatation bilaterally, no wheezes, rales, or rhonchii  · Cardiovascular  o Heart  o : RRR, no gallops, rubs or murmurs  o Peripheral Vascular System  o : well perfused  · Lymphatic  o Neck  o : no lymphadenopathy present, normal size  o Axilla  o : no lymphadenopathy present, normal size  · Neurologic  o Cranial Nerves  o : cranial nerves intact bilaterally  o Gait and Station  o : normal gait, able to stand without difficulty  · Psychiatric  o General  o : normal mood and affect              Assessment  · Anemia     285.9/D64.9  He was given a lab order today for a CBC, iron profile, haptaglobin, LDH and peripheral smear to be managed according to findings. "   · Hypothyroidism     244.9/E03.9  He will be continued on his current dose of thyroid replacement. He was given a lab order today for a thyroid profile.   · Tobacco abuse counseling       Tobacco abuse counseling     V65.42/Z71.6  Smoking cessation was highly recommended.  · Erectile disorder, acquired, generalized, moderate     302.72/F52.21  He was given a prescription for Cialis to be taken as directed.   · Adult BMI 31.0-31.9 kg/sq m     V85.31/Z68.31  Diet, wt. loss and exercise were encouraged.       Plan  · Orders  o ACO-17: Screened for tobacco use AND received tobacco cessation intervention (4004F) - V65.42/Z71.6 - 12/04/2020  o CBC with Auto Diff [facility] (74864) - 285.9/D64.9 - 12/04/2020  o Thyroid Profile (42960, 03936, THYII) - 244.9/E03.9 - 12/04/2020  o ACO-39: Current medications updated and reviewed (, 1159F) - - 12/04/2020  o ACO-14: Influenza immunization was not administered for reasons documented [facility] () - - 12/04/2020   Patient declined   o ACO-13: Fall Risk Screening with no falls in past year or only one fall without injury in the past year (1101F) - - 12/04/2020  o ACO - Pt declines to or was not able to provide an Advance Care Plan or name a Surrogate Decision Maker (1124F) - - 12/04/2020  o Iron Profile (Iron 10071 TIBC 47139 and Transferrin 37842) (IRONP) - 285.9/D64.9 - 12/04/2020  o Ferritin ser/plas (90754) - 285.9/D64.9 - 12/04/2020  o LDH (lactate dehydrogenase) (71466) - 285.9/D64.9 - 12/04/2020  o Peripheral blood smear w micro (71518) - 285.9/D64.9 - 12/04/2020  o Haptoglobin (59406) - 285.9/D64.9 - 12/04/2020  · Medications  o Cialis 10 mg oral tablet   SIG: take 1 tablet (10 mg) by oral route once daily for 30 days   DISP: (30) Tablet with 5 refills  Prescribed on 12/04/2020     o Medications have been Reconciled  o Transition of Care or Provider Policy  · Instructions  o Tobacco and smoking cessation counseling for more than 3 minutes was completed.  o Patient was  educated/instructed on their diagnosis, treatment and medications prior to discharge from the clinic today.  · Disposition  o Follow up in 6 months            Electronically Signed by: Eduarda Butt DO - on December 4, 2020 01:44:53 PM

## 2021-05-14 VITALS
WEIGHT: 228.5 LBS | BODY MASS INDEX: 31.99 KG/M2 | HEIGHT: 71 IN | HEART RATE: 65 BPM | SYSTOLIC BLOOD PRESSURE: 117 MMHG | OXYGEN SATURATION: 97 % | RESPIRATION RATE: 18 BRPM | TEMPERATURE: 97.5 F | DIASTOLIC BLOOD PRESSURE: 61 MMHG

## 2021-05-15 VITALS
RESPIRATION RATE: 20 BRPM | HEIGHT: 71 IN | TEMPERATURE: 98.5 F | BODY MASS INDEX: 31.36 KG/M2 | HEART RATE: 65 BPM | WEIGHT: 224 LBS | OXYGEN SATURATION: 96 % | DIASTOLIC BLOOD PRESSURE: 60 MMHG | SYSTOLIC BLOOD PRESSURE: 116 MMHG

## 2021-05-15 VITALS
SYSTOLIC BLOOD PRESSURE: 96 MMHG | HEART RATE: 66 BPM | BODY MASS INDEX: 31.08 KG/M2 | DIASTOLIC BLOOD PRESSURE: 70 MMHG | HEIGHT: 71 IN | WEIGHT: 222 LBS

## 2021-05-15 VITALS
HEIGHT: 70 IN | WEIGHT: 224 LBS | BODY MASS INDEX: 32.07 KG/M2 | HEART RATE: 66 BPM | SYSTOLIC BLOOD PRESSURE: 116 MMHG | DIASTOLIC BLOOD PRESSURE: 66 MMHG

## 2021-05-15 VITALS — HEIGHT: 71 IN | RESPIRATION RATE: 14 BRPM | WEIGHT: 222 LBS | BODY MASS INDEX: 31.08 KG/M2

## 2021-05-16 VITALS
DIASTOLIC BLOOD PRESSURE: 76 MMHG | SYSTOLIC BLOOD PRESSURE: 112 MMHG | WEIGHT: 199 LBS | HEIGHT: 71 IN | BODY MASS INDEX: 27.86 KG/M2

## 2021-05-16 VITALS — WEIGHT: 218.12 LBS | BODY MASS INDEX: 30.54 KG/M2 | HEIGHT: 71 IN | RESPIRATION RATE: 16 BRPM

## 2021-05-16 VITALS
BODY MASS INDEX: 29.96 KG/M2 | HEART RATE: 58 BPM | WEIGHT: 214 LBS | SYSTOLIC BLOOD PRESSURE: 112 MMHG | DIASTOLIC BLOOD PRESSURE: 70 MMHG | HEIGHT: 71 IN

## 2021-06-11 ENCOUNTER — OFFICE VISIT (OUTPATIENT)
Dept: FAMILY MEDICINE CLINIC | Facility: CLINIC | Age: 61
End: 2021-06-11

## 2021-06-11 VITALS
RESPIRATION RATE: 16 BRPM | HEIGHT: 71 IN | SYSTOLIC BLOOD PRESSURE: 101 MMHG | TEMPERATURE: 97.6 F | WEIGHT: 227.2 LBS | BODY MASS INDEX: 31.81 KG/M2 | HEART RATE: 71 BPM | DIASTOLIC BLOOD PRESSURE: 51 MMHG

## 2021-06-11 DIAGNOSIS — Z85.46 HISTORY OF PROSTATE CANCER: ICD-10-CM

## 2021-06-11 DIAGNOSIS — I10 ESSENTIAL HYPERTENSION: ICD-10-CM

## 2021-06-11 DIAGNOSIS — E03.9 ACQUIRED HYPOTHYROIDISM: ICD-10-CM

## 2021-06-11 DIAGNOSIS — Z72.0 TOBACCO ABUSE: Primary | ICD-10-CM

## 2021-06-11 DIAGNOSIS — Z00.00 ANNUAL PHYSICAL EXAM: ICD-10-CM

## 2021-06-11 DIAGNOSIS — Z12.5 SCREENING FOR PROSTATE CANCER: ICD-10-CM

## 2021-06-11 PROBLEM — Z86.73 HISTORY OF CVA (CEREBROVASCULAR ACCIDENT): Status: ACTIVE | Noted: 2021-06-11

## 2021-06-11 PROBLEM — N52.9 ED (ERECTILE DYSFUNCTION): Status: ACTIVE | Noted: 2021-06-11

## 2021-06-11 PROBLEM — E78.49 OTHER HYPERLIPIDEMIA: Status: ACTIVE | Noted: 2021-06-11

## 2021-06-11 PROBLEM — I50.31 ACUTE DIASTOLIC CHF (CONGESTIVE HEART FAILURE): Status: ACTIVE | Noted: 2021-06-11

## 2021-06-11 PROBLEM — E66.01 MORBID (SEVERE) OBESITY DUE TO EXCESS CALORIES: Status: ACTIVE | Noted: 2021-06-11

## 2021-06-11 PROBLEM — M15.9 PRIMARY OSTEOARTHRITIS INVOLVING MULTIPLE JOINTS: Status: ACTIVE | Noted: 2021-06-11

## 2021-06-11 PROBLEM — J30.2 SEASONAL ALLERGIES: Status: ACTIVE | Noted: 2021-06-11

## 2021-06-11 PROBLEM — M15.0 PRIMARY OSTEOARTHRITIS INVOLVING MULTIPLE JOINTS: Status: ACTIVE | Noted: 2021-06-11

## 2021-06-11 PROCEDURE — 99213 OFFICE O/P EST LOW 20 MIN: CPT | Performed by: FAMILY MEDICINE

## 2021-06-11 RX ORDER — LORATADINE 10 MG/1
10 CAPSULE, LIQUID FILLED ORAL
COMMUNITY

## 2021-06-11 RX ORDER — LEVOTHYROXINE SODIUM 0.12 MG/1
125 TABLET ORAL 2 TIMES DAILY
COMMUNITY
Start: 2021-05-28 | End: 2021-06-11 | Stop reason: SDUPTHER

## 2021-06-11 RX ORDER — MULTIPLE VITAMINS W/ MINERALS TAB 9MG-400MCG
1 TAB ORAL DAILY
COMMUNITY

## 2021-06-11 RX ORDER — LIOTHYRONINE SODIUM 5 UG/1
10 TABLET ORAL DAILY
Qty: 60 TABLET | Refills: 11 | Status: SHIPPED | OUTPATIENT
Start: 2021-06-11 | End: 2022-04-11 | Stop reason: SDUPTHER

## 2021-06-11 RX ORDER — FERROUS SULFATE 325(65) MG
1 TABLET ORAL DAILY
COMMUNITY
Start: 2021-04-20 | End: 2021-10-20

## 2021-06-11 RX ORDER — METOPROLOL SUCCINATE 25 MG/1
TABLET, EXTENDED RELEASE ORAL
COMMUNITY
Start: 2021-04-25 | End: 2021-08-18 | Stop reason: SDUPTHER

## 2021-06-11 RX ORDER — DIPHENHYDRAMINE HCL 25 MG
25 CAPSULE ORAL EVERY 6 HOURS PRN
COMMUNITY
End: 2022-09-08

## 2021-06-11 RX ORDER — LEVOTHYROXINE SODIUM 0.12 MG/1
125 TABLET ORAL 2 TIMES DAILY
Qty: 60 TABLET | Refills: 11 | Status: SHIPPED | OUTPATIENT
Start: 2021-06-11 | End: 2022-04-11 | Stop reason: SDUPTHER

## 2021-06-11 RX ORDER — ASPIRIN 81 MG/1
TABLET, COATED ORAL
COMMUNITY
Start: 2021-04-11 | End: 2021-12-28 | Stop reason: SDUPTHER

## 2021-06-11 RX ORDER — ACETAMINOPHEN 160 MG
TABLET,DISINTEGRATING ORAL
COMMUNITY
Start: 2021-04-25 | End: 2021-12-28 | Stop reason: SDUPTHER

## 2021-06-11 RX ORDER — ATORVASTATIN CALCIUM 10 MG/1
TABLET, FILM COATED ORAL
COMMUNITY
Start: 2021-04-25 | End: 2021-08-18 | Stop reason: SDUPTHER

## 2021-06-11 RX ORDER — FUROSEMIDE 40 MG/1
40 TABLET ORAL
COMMUNITY
Start: 2021-05-25 | End: 2021-08-18 | Stop reason: SDUPTHER

## 2021-06-11 RX ORDER — LIOTHYRONINE SODIUM 5 UG/1
10 TABLET ORAL DAILY
COMMUNITY
Start: 2021-04-14 | End: 2021-06-11 | Stop reason: SDUPTHER

## 2021-06-11 NOTE — PROGRESS NOTES
"Chief Complaint  Follow-up (discuss medications ) and Follow-up    Subjective          Colton Chiang presents to [facility]  The patient is here today for a follow-up for the management of his chronic medical condtions.  He is  with one daughter.  He has a past medical history significant for tobacco abuse, prostate cancer, erectile dysfunction, hypothyroidism, depression, history of CVA, TB positive test, arthritis, seasonal allergies, morbid obesity, Congestive heart failure, hypertension and hyperlipidemia. He has a family history of breast cancer.    The patient is still continue to smoke.    The patient is continue to take 2-5 mcg Cytomel and 2-125 mcg levothyroxine daily.  His most recent TSH on 6/2/21 was found to be 3.97.  He denies feeling any more fatigued than he usually does.  The patient works a lot of hours and stays tired a lot.    The patient has no other complaints today and denies chest pain, shortness of breath, weakness, numbness, nausea, vomiting, diarrhea, dizziness or syncopal event.        Objective   Vital Signs:   /51 (BP Location: Left arm, Patient Position: Sitting)   Pulse 71   Temp 97.6 °F (36.4 °C)   Resp 16   Ht 180.3 cm (71\")   Wt 103 kg (227 lb 3.2 oz)   BMI 31.69 kg/m²     Physical Exam  Vitals reviewed.   Constitutional:       Appearance: Normal appearance. He is well-developed.   HENT:      Head: Normocephalic and atraumatic.      Right Ear: External ear normal.      Left Ear: External ear normal.      Mouth/Throat:      Pharynx: No oropharyngeal exudate.   Eyes:      Conjunctiva/sclera: Conjunctivae normal.      Pupils: Pupils are equal, round, and reactive to light.   Neck:      Vascular: No carotid bruit.   Cardiovascular:      Rate and Rhythm: Normal rate and regular rhythm.      Heart sounds: No murmur heard.   No friction rub. No gallop.    Pulmonary:      Effort: Pulmonary effort is normal.      Breath sounds: Normal " breath sounds. No wheezing or rhonchi.   Abdominal:      General: Bowel sounds are normal. There is no distension.      Palpations: Abdomen is soft.      Tenderness: There is no abdominal tenderness.   Skin:     General: Skin is warm and dry.   Neurological:      Mental Status: He is alert and oriented to person, place, and time.      Cranial Nerves: No cranial nerve deficit.      Motor: No weakness.   Psychiatric:         Mood and Affect: Mood and affect normal.         Behavior: Behavior normal.         Thought Content: Thought content normal.         Judgment: Judgment normal.        Result Review :                 Assessment and Plan    Diagnoses and all orders for this visit:    1. Tobacco abuse (Primary)  Assessment & Plan:  The patient was highly encouraged to stop smoking.       2. Acquired hypothyroidism  Assessment & Plan:  The patient is currently clinically and metabolically euthyroid with his current dosages of thyroid replacement medications.      3. Essential hypertension  Assessment & Plan:  The patient's blood pressure currently well controlled with his current medications.      Other orders  -     levothyroxine (SYNTHROID, LEVOTHROID) 125 MCG tablet; Take 1 tablet by mouth 2 (Two) Times a Day.  Dispense: 60 tablet; Refill: 11  -     liothyronine (CYTOMEL) 5 MCG tablet; Take 2 tablets by mouth Daily.  Dispense: 60 tablet; Refill: 11      Follow Up   Return in about 6 months (around 12/11/2021).  Patient was given instructions and counseling regarding his condition or for health maintenance advice. Please see specific information pulled into the AVS if appropriate.

## 2021-06-11 NOTE — ASSESSMENT & PLAN NOTE
The patient is currently clinically and metabolically euthyroid with his current dosages of thyroid replacement medications.

## 2021-08-14 ENCOUNTER — LAB (OUTPATIENT)
Dept: LAB | Facility: HOSPITAL | Age: 61
End: 2021-08-14

## 2021-08-14 ENCOUNTER — TRANSCRIBE ORDERS (OUTPATIENT)
Dept: LAB | Facility: HOSPITAL | Age: 61
End: 2021-08-14

## 2021-08-14 DIAGNOSIS — Z79.01 MONITORING FOR ANTICOAGULANT USE: ICD-10-CM

## 2021-08-14 DIAGNOSIS — E78.5 HYPERLIPEMIA, IDIOPATHIC FAMILIAL: Primary | ICD-10-CM

## 2021-08-14 DIAGNOSIS — I10 ESSENTIAL HYPERTENSION, MALIGNANT: ICD-10-CM

## 2021-08-14 DIAGNOSIS — E78.5 HYPERLIPEMIA, IDIOPATHIC FAMILIAL: ICD-10-CM

## 2021-08-14 DIAGNOSIS — Z51.81 MONITORING FOR ANTICOAGULANT USE: ICD-10-CM

## 2021-08-14 LAB
ALBUMIN SERPL-MCNC: 4 G/DL (ref 3.5–5.2)
ALBUMIN/GLOB SERPL: 1.3 G/DL
ALP SERPL-CCNC: 104 U/L (ref 39–117)
ALT SERPL W P-5'-P-CCNC: 28 U/L (ref 1–41)
ANION GAP SERPL CALCULATED.3IONS-SCNC: 7.5 MMOL/L (ref 5–15)
AST SERPL-CCNC: 29 U/L (ref 1–40)
BILIRUB SERPL-MCNC: 0.3 MG/DL (ref 0–1.2)
BUN SERPL-MCNC: 14 MG/DL (ref 8–23)
BUN/CREAT SERPL: 18.4 (ref 7–25)
CALCIUM SPEC-SCNC: 8.9 MG/DL (ref 8.6–10.5)
CHLORIDE SERPL-SCNC: 100 MMOL/L (ref 98–107)
CHOLEST SERPL-MCNC: 131 MG/DL (ref 0–200)
CO2 SERPL-SCNC: 29.5 MMOL/L (ref 22–29)
CREAT SERPL-MCNC: 0.76 MG/DL (ref 0.76–1.27)
GFR SERPL CREATININE-BSD FRML MDRD: 104 ML/MIN/1.73
GLOBULIN UR ELPH-MCNC: 3.1 GM/DL
GLUCOSE SERPL-MCNC: 95 MG/DL (ref 65–99)
HDLC SERPL-MCNC: 40 MG/DL (ref 40–60)
LDLC SERPL CALC-MCNC: 81 MG/DL (ref 0–100)
LDLC/HDLC SERPL: 2.05 {RATIO}
POTASSIUM SERPL-SCNC: 4.4 MMOL/L (ref 3.5–5.2)
PROT SERPL-MCNC: 7.1 G/DL (ref 6–8.5)
SODIUM SERPL-SCNC: 137 MMOL/L (ref 136–145)
TRIGL SERPL-MCNC: 45 MG/DL (ref 0–150)
VLDLC SERPL-MCNC: 10 MG/DL (ref 5–40)

## 2021-08-14 PROCEDURE — 36415 COLL VENOUS BLD VENIPUNCTURE: CPT

## 2021-08-14 PROCEDURE — 80053 COMPREHEN METABOLIC PANEL: CPT

## 2021-08-14 PROCEDURE — 80061 LIPID PANEL: CPT

## 2021-08-18 ENCOUNTER — OFFICE VISIT (OUTPATIENT)
Dept: CARDIOLOGY | Facility: CLINIC | Age: 61
End: 2021-08-18

## 2021-08-18 VITALS
WEIGHT: 226 LBS | SYSTOLIC BLOOD PRESSURE: 114 MMHG | DIASTOLIC BLOOD PRESSURE: 58 MMHG | HEART RATE: 64 BPM | HEIGHT: 71 IN | BODY MASS INDEX: 31.64 KG/M2

## 2021-08-18 DIAGNOSIS — E03.9 ACQUIRED HYPOTHYROIDISM: ICD-10-CM

## 2021-08-18 DIAGNOSIS — E78.49 OTHER HYPERLIPIDEMIA: ICD-10-CM

## 2021-08-18 DIAGNOSIS — D64.9 ANEMIA, UNSPECIFIED TYPE: ICD-10-CM

## 2021-08-18 DIAGNOSIS — I10 ESSENTIAL HYPERTENSION: ICD-10-CM

## 2021-08-18 DIAGNOSIS — Z72.0 TOBACCO ABUSE: ICD-10-CM

## 2021-08-18 DIAGNOSIS — I50.32 CHRONIC DIASTOLIC CONGESTIVE HEART FAILURE (HCC): Primary | ICD-10-CM

## 2021-08-18 PROCEDURE — 99214 OFFICE O/P EST MOD 30 MIN: CPT | Performed by: NURSE PRACTITIONER

## 2021-08-18 RX ORDER — ATORVASTATIN CALCIUM 10 MG/1
10 TABLET, FILM COATED ORAL DAILY
Qty: 90 TABLET | Refills: 3 | Status: SHIPPED | OUTPATIENT
Start: 2021-08-18 | End: 2021-08-19 | Stop reason: SDUPTHER

## 2021-08-18 RX ORDER — METOPROLOL SUCCINATE 25 MG/1
TABLET, EXTENDED RELEASE ORAL
Qty: 45 TABLET | Refills: 3 | Status: SHIPPED | OUTPATIENT
Start: 2021-08-18 | End: 2021-08-19 | Stop reason: SDUPTHER

## 2021-08-18 RX ORDER — FUROSEMIDE 40 MG/1
TABLET ORAL
Qty: 90 TABLET | Refills: 3 | Status: SHIPPED | OUTPATIENT
Start: 2021-08-18 | End: 2021-08-19 | Stop reason: SDUPTHER

## 2021-08-18 NOTE — ASSESSMENT & PLAN NOTE
Without shortness of breath.  Class I-II.  Continue furosemide and metoprolol.  Can take an extra half of the Lasix as needed for increased swelling.

## 2021-08-18 NOTE — ASSESSMENT & PLAN NOTE
I have educated him on the risk of diseases from using tobacco products such as heart disease. Patient verbalizes understanding of the need for smoking cessation but is unwilling to attempt at this time.  Declined any aids.

## 2021-08-18 NOTE — PROGRESS NOTES
No results found for: PROBNP, BNP  CMP    CMP 8/14/21   Glucose 95   BUN 14   Creatinine 0.76   eGFR Non African Am 104   Sodium 137   Potassium 4.4   Chloride 100   Calcium 8.9   Albumin 4.00   Total Bilirubin 0.3   Alkaline Phosphatase 104   AST (SGOT) 29   ALT (SGPT) 28           CBC w/diff    CBC w/Diff 12/4/20   WBC 6.57   RBC 5.17   Hemoglobin 14.0   Hematocrit 44.6   MCV 86.3   MCH 27.1   MCHC 31.4 (A)   RDW 14.0   Platelets 250   Neutrophil Rel % 56.1   Lymphocyte Rel % 25.9   Monocyte Rel % 8.7   Eosinophil Rel % 7.8 (A)   Basophil Rel % 1.2   (A) Abnormal value             Lipid Panel    Lipid Panel 8/14/21   Total Cholesterol 131   Triglycerides 45   HDL Cholesterol 40   VLDL Cholesterol 10   LDL Cholesterol  81   LDL/HDL Ratio 2.05            Lab Results   Component Value Date    TSH 3.970 06/02/2021    TSH 3.850 12/04/2020    TSH 3.060 08/10/2020      Lab Results   Component Value Date    FREET4 1.3 12/04/2020    FREET4 1.3 08/10/2020    FREET4 1.4 12/13/2019      No results found for: DDIMERQUANT  No results found for: MG   No results found for: DIGOXIN          Chief Complaint  Follow-up (No complaints)    Subjective            Colton Chiang presents to [facility]  He is a 61-year-old white male comes in has occasional pedal edema is mild and usually resolves at night when it does occur.  Complains of nasal congestion but attributed to always working in the dust. Denies any chest pains, shortness of breath, palpitations, dizziness, syncope, PND, or orthopnea.  Cardiac wise he has no further complaints.  Gained 2 pounds since his last visit.  States that he is having hard time following his weight watchers diet without actually going to meeting due to Covid.  Continues to smoke at least a half a pack a day with no desire to stop.  Has not had his Covid vaccine he is adamant he is not going to get it.              Past History:    Past Medical History:   Diagnosis Date    • Allergies    • Arthritis    • Blood disease    • Broken bones     Cracked   • CHF (congestive heart failure) (CMS/[facility]) 1990   • Chronic back pain    • Deafness, bilateral    • Depression    • HBP (high blood pressure)    • Heart disease    • High cholesterol    • History of radiation therapy    • Hypothyroid 02/10/2017   • Prostate cancer (CMS/[facility]) 02/05/2016   • 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, bilateral    • Stroke (CMS/[facility])    • Thyroid disorder    • 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         Family History: family history includes Breast cancer in his sister; Diabetes in an other family member; Heart disease in an other family member.     Social History: reports that he has been smoking. He has been smoking about 1.50 packs per day. He has never used smokeless tobacco. He reports that he does not drink alcohol and does not use drugs.    Allergies: Penicillins      Past Surgical History:   Procedure Laterality Date   • COLONOSCOPY     • PROSTATE BIOPSY          Prior to Admission medications    Medication Sig Start Date End Date Taking? Authorizing Provider   Aspirin Low Dose 81 MG EC tablet  4/11/21  Yes Juan Luis Davis MD   atorvastatin (LIPITOR) 10 MG tablet  4/25/21  Yes Juan Luis Davis MD   Cholecalciferol (Vitamin D3) 50 MCG (2000 UT) capsule  4/25/21  Yes Juan Luis Davis MD   diphenhydrAMINE (BENADRYL) 25 mg capsule Take 25 mg by mouth Every 6 (Six) Hours As Needed for Itching. Take 1/2 tablet   Yes Juan Luis Davis MD   FeroSul 325 (65 Fe) MG tablet Take 1 tablet by mouth Daily. 4/20/21  Yes Juan Luis Davis MD   furosemide (LASIX) 40 MG tablet  takes half a tab 1 day and whole tab the next day 5/25/21  Yes Juan Luis Davis MD   levothyroxine (SYNTHROID, LEVOTHROID) 125 MCG tablet Take 1 tablet by mouth 2 (Two) Times a Day. 6/11/21  Yes Eduarda Butt DO   liothyronine (CYTOMEL) 5 MCG tablet Take 2 tablets by mouth Daily. 6/11/21  Yes Eduarda Butt DO   Loratadine (Claritin) 10 MG  "capsule Take 10 mg by mouth.   Yes Provider, MD Juan Luis   metoprolol succinate XL (TOPROL-XL) 25 MG 24 hr tablet  4/25/21  Yes Provider, MD Juan Luis   multivitamin with minerals tablet tablet Take 1 tablet by mouth Daily.   Yes Provider, MD Juan Luis        Review of Systems   HENT: Positive for congestion (Normal for him, from dust that he works with).    Cardiovascular: Positive for leg swelling (Occasional).   All other systems reviewed and are negative.       Objective     Physical Exam  Constitutional:       Appearance: Normal appearance. He is obese.   Eyes:      Pupils: Pupils are equal, round, and reactive to light.   Neck:      Vascular: No carotid bruit.   Cardiovascular:      Rate and Rhythm: Normal rate and regular rhythm.      Chest Wall: PMI is displaced.      Pulses: Normal pulses.      Heart sounds: S1 normal and S2 normal. Heart sounds are distant. No murmur heard.   No S3 or S4 sounds.    Pulmonary:      Effort: Pulmonary effort is normal.      Breath sounds: Normal breath sounds.      Comments: Increased AP diameter  Abdominal:      General: Bowel sounds are normal.      Palpations: Abdomen is soft.   Musculoskeletal:      Right lower leg: No edema.      Left lower leg: No edema.   Neurological:      Mental Status: He is alert.       /58 (Patient Position: Sitting)   Pulse 64   Ht 180.3 cm (71\")   Wt 103 kg (226 lb)   BMI 31.52 kg/m²       Vitals:    08/18/21 0755   BP: 114/58   Pulse: 64       Result Review :         The following data was reviewed by: PRIMO Almazan on 08/18/2021:      No results found for: PROBNP, BNP  CMP    CMP 8/14/21   Glucose 95   BUN 14   Creatinine 0.76   eGFR Non African Am 104   Sodium 137   Potassium 4.4   Chloride 100   Calcium 8.9   Albumin 4.00   Total Bilirubin 0.3   Alkaline Phosphatase 104   AST (SGOT) 29   ALT (SGPT) 28           CBC w/diff    CBC w/Diff 12/4/20   WBC 6.57   RBC 5.17   Hemoglobin 14.0   Hematocrit 44.6   MCV 86.3 "   MCH 27.1   MCHC 31.4 (A)   RDW 14.0   Platelets 250   Neutrophil Rel % 56.1   Lymphocyte Rel % 25.9   Monocyte Rel % 8.7   Eosinophil Rel % 7.8 (A)   Basophil Rel % 1.2   (A) Abnormal value             Lipid Panel    Lipid Panel 8/14/21   Total Cholesterol 131   Triglycerides 45   HDL Cholesterol 40   VLDL Cholesterol 10   LDL Cholesterol  81   LDL/HDL Ratio 2.05            Lab Results   Component Value Date    TSH 3.970 06/02/2021    TSH 3.850 12/04/2020    TSH 3.060 08/10/2020      Lab Results   Component Value Date    FREET4 1.3 12/04/2020    FREET4 1.3 08/10/2020    FREET4 1.4 12/13/2019      No results found for: DDIMERQUANT  No results found for: MG   No results found for: DIGOXIN                          Assessment and Plan        Diagnoses and all orders for this visit:    1. Chronic diastolic congestive heart failure (CMS/HCC) (Primary)  Assessment & Plan:  Without shortness of breath.  Class I-II.  Continue furosemide and metoprolol.  Can take an extra half of the Lasix as needed for increased swelling.      2. Essential hypertension  Assessment & Plan:  Controlled.  Continue metoprolol and furosemide.    Orders:  -     metoprolol succinate XL (TOPROL-XL) 25 MG 24 hr tablet; Take 1/2 tab po qd  Dispense: 45 tablet; Refill: 3  -     furosemide (LASIX) 40 MG tablet; Take a half a tab 1 day and a whole tab the next day.  Can take an extra half on a as needed basis  Dispense: 90 tablet; Refill: 3  -     Comprehensive Metabolic Panel; Future  -     Magnesium; Future    3. Other hyperlipidemia  Assessment & Plan:  Controlled for a status.  Continue atorvastatin.    Orders:  -     atorvastatin (LIPITOR) 10 MG tablet; Take 1 tablet by mouth Daily.  Dispense: 90 tablet; Refill: 3  -     Lipid Panel; Future  -     Comprehensive Metabolic Panel; Future    4. Anemia, unspecified type  -     CBC & Differential; Future    5. Acquired hypothyroidism  Assessment & Plan:  Stable, monitor by PCP, continue levothyroxine  and liothyronine    Orders:  -     Thyroid Panel With TSH; Future    6. Tobacco abuse  Assessment & Plan:  I have educated him on the risk of diseases from using tobacco products such as heart disease. Patient verbalizes understanding of the need for smoking cessation but is unwilling to attempt at this time.  Declined any aids.      6.  Strongly encouraged him to get the Covid vaccine and use safety precautions.          Follow Up     Return in about 1 year (around 8/18/2022) for with Dr. Dickson.    Patient was given instructions and counseling regarding his condition or for health maintenance advice. Please see specific information pulled into the AVS if appropriate.       PRIMO Ryan  08/18/21 08:01 EDT

## 2021-08-19 DIAGNOSIS — I10 ESSENTIAL HYPERTENSION: ICD-10-CM

## 2021-08-19 DIAGNOSIS — E78.49 OTHER HYPERLIPIDEMIA: ICD-10-CM

## 2021-08-19 RX ORDER — FUROSEMIDE 40 MG/1
TABLET ORAL
Qty: 90 TABLET | Refills: 3 | Status: SHIPPED | OUTPATIENT
Start: 2021-08-19 | End: 2022-07-26 | Stop reason: SDUPTHER

## 2021-08-19 RX ORDER — ATORVASTATIN CALCIUM 10 MG/1
10 TABLET, FILM COATED ORAL DAILY
Qty: 90 TABLET | Refills: 3 | Status: SHIPPED | OUTPATIENT
Start: 2021-08-19 | End: 2022-07-26 | Stop reason: SDUPTHER

## 2021-08-19 RX ORDER — METOPROLOL SUCCINATE 25 MG/1
TABLET, EXTENDED RELEASE ORAL
Qty: 45 TABLET | Refills: 3 | Status: SHIPPED | OUTPATIENT
Start: 2021-08-19 | End: 2022-07-26 | Stop reason: SDUPTHER

## 2021-10-20 RX ORDER — FERROUS SULFATE 325(65) MG
TABLET ORAL
Qty: 90 TABLET | Refills: 0 | Status: SHIPPED | OUTPATIENT
Start: 2021-10-20 | End: 2022-01-24

## 2021-12-10 ENCOUNTER — OFFICE VISIT (OUTPATIENT)
Dept: FAMILY MEDICINE CLINIC | Facility: CLINIC | Age: 61
End: 2021-12-10

## 2021-12-10 ENCOUNTER — LAB (OUTPATIENT)
Dept: LAB | Facility: HOSPITAL | Age: 61
End: 2021-12-10

## 2021-12-10 VITALS
OXYGEN SATURATION: 96 % | HEIGHT: 71 IN | HEART RATE: 62 BPM | WEIGHT: 226.6 LBS | TEMPERATURE: 97.2 F | RESPIRATION RATE: 18 BRPM | DIASTOLIC BLOOD PRESSURE: 65 MMHG | BODY MASS INDEX: 31.72 KG/M2 | SYSTOLIC BLOOD PRESSURE: 137 MMHG

## 2021-12-10 DIAGNOSIS — Z85.46 HISTORY OF PROSTATE CANCER: ICD-10-CM

## 2021-12-10 DIAGNOSIS — E03.9 ACQUIRED HYPOTHYROIDISM: ICD-10-CM

## 2021-12-10 DIAGNOSIS — E03.9 ACQUIRED HYPOTHYROIDISM: Chronic | ICD-10-CM

## 2021-12-10 DIAGNOSIS — D64.9 ANEMIA, UNSPECIFIED TYPE: ICD-10-CM

## 2021-12-10 DIAGNOSIS — Z00.00 ANNUAL PHYSICAL EXAM: Primary | ICD-10-CM

## 2021-12-10 DIAGNOSIS — Z00.00 ANNUAL PHYSICAL EXAM: ICD-10-CM

## 2021-12-10 DIAGNOSIS — I10 PRIMARY HYPERTENSION: Chronic | ICD-10-CM

## 2021-12-10 DIAGNOSIS — F17.219 CIGARETTE NICOTINE DEPENDENCE WITH NICOTINE-INDUCED DISORDER: Chronic | ICD-10-CM

## 2021-12-10 PROBLEM — Z86.73 HISTORY OF CVA (CEREBROVASCULAR ACCIDENT): Chronic | Status: ACTIVE | Noted: 2021-06-11

## 2021-12-10 PROBLEM — I50.32 CHRONIC DIASTOLIC (CONGESTIVE) HEART FAILURE: Chronic | Status: ACTIVE | Noted: 2021-12-10

## 2021-12-10 PROBLEM — J30.2 SEASONAL ALLERGIES: Chronic | Status: ACTIVE | Noted: 2021-06-11

## 2021-12-10 PROBLEM — M15.0 PRIMARY OSTEOARTHRITIS INVOLVING MULTIPLE JOINTS: Chronic | Status: ACTIVE | Noted: 2021-06-11

## 2021-12-10 PROBLEM — Z72.0 TOBACCO ABUSE: Status: RESOLVED | Noted: 2021-06-11 | Resolved: 2021-12-10

## 2021-12-10 PROBLEM — E78.49 OTHER HYPERLIPIDEMIA: Status: RESOLVED | Noted: 2021-06-11 | Resolved: 2021-12-10

## 2021-12-10 PROBLEM — M15.9 PRIMARY OSTEOARTHRITIS INVOLVING MULTIPLE JOINTS: Chronic | Status: ACTIVE | Noted: 2021-06-11

## 2021-12-10 PROBLEM — I50.31 ACUTE DIASTOLIC CHF (CONGESTIVE HEART FAILURE) (HCC): Status: RESOLVED | Noted: 2021-06-11 | Resolved: 2021-12-10

## 2021-12-10 PROBLEM — E66.01 MORBID (SEVERE) OBESITY DUE TO EXCESS CALORIES: Status: RESOLVED | Noted: 2021-06-11 | Resolved: 2021-12-10

## 2021-12-10 PROBLEM — N52.9 ED (ERECTILE DYSFUNCTION): Chronic | Status: ACTIVE | Noted: 2021-06-11

## 2021-12-10 LAB
25(OH)D3 SERPL-MCNC: 40 NG/ML
ALBUMIN SERPL-MCNC: 4.1 G/DL (ref 3.5–5.2)
ALBUMIN/GLOB SERPL: 1.2 G/DL
ALP SERPL-CCNC: 97 U/L (ref 39–117)
ALT SERPL W P-5'-P-CCNC: 32 U/L (ref 1–41)
ANION GAP SERPL CALCULATED.3IONS-SCNC: 9.7 MMOL/L (ref 5–15)
AST SERPL-CCNC: 30 U/L (ref 1–40)
BASOPHILS # BLD AUTO: 0.08 10*3/MM3 (ref 0–0.2)
BASOPHILS NFR BLD AUTO: 1.3 % (ref 0–1.5)
BILIRUB SERPL-MCNC: 0.3 MG/DL (ref 0–1.2)
BUN SERPL-MCNC: 13 MG/DL (ref 8–23)
BUN/CREAT SERPL: 15.3 (ref 7–25)
CALCIUM SPEC-SCNC: 9.1 MG/DL (ref 8.6–10.5)
CHLORIDE SERPL-SCNC: 96 MMOL/L (ref 98–107)
CO2 SERPL-SCNC: 29.3 MMOL/L (ref 22–29)
CREAT SERPL-MCNC: 0.85 MG/DL (ref 0.76–1.27)
DEPRECATED RDW RBC AUTO: 40.4 FL (ref 37–54)
EOSINOPHIL # BLD AUTO: 0.41 10*3/MM3 (ref 0–0.4)
EOSINOPHIL NFR BLD AUTO: 6.6 % (ref 0.3–6.2)
ERYTHROCYTE [DISTWIDTH] IN BLOOD BY AUTOMATED COUNT: 13.4 % (ref 12.3–15.4)
FERRITIN SERPL-MCNC: 193 NG/ML (ref 30–400)
GFR SERPL CREATININE-BSD FRML MDRD: 91 ML/MIN/1.73
GLOBULIN UR ELPH-MCNC: 3.5 GM/DL
GLUCOSE SERPL-MCNC: 86 MG/DL (ref 65–99)
HCT VFR BLD AUTO: 43.2 % (ref 37.5–51)
HGB BLD-MCNC: 14.2 G/DL (ref 13–17.7)
IMM GRANULOCYTES # BLD AUTO: 0.02 10*3/MM3 (ref 0–0.05)
IMM GRANULOCYTES NFR BLD AUTO: 0.3 % (ref 0–0.5)
IRON 24H UR-MRATE: 50 MCG/DL (ref 59–158)
IRON SATN MFR SERPL: 14 % (ref 20–50)
LYMPHOCYTES # BLD AUTO: 1.42 10*3/MM3 (ref 0.7–3.1)
LYMPHOCYTES NFR BLD AUTO: 22.8 % (ref 19.6–45.3)
MCH RBC QN AUTO: 27.4 PG (ref 26.6–33)
MCHC RBC AUTO-ENTMCNC: 32.9 G/DL (ref 31.5–35.7)
MCV RBC AUTO: 83.2 FL (ref 79–97)
MONOCYTES # BLD AUTO: 0.55 10*3/MM3 (ref 0.1–0.9)
MONOCYTES NFR BLD AUTO: 8.8 % (ref 5–12)
NEUTROPHILS NFR BLD AUTO: 3.75 10*3/MM3 (ref 1.7–7)
NEUTROPHILS NFR BLD AUTO: 60.2 % (ref 42.7–76)
NRBC BLD AUTO-RTO: 0 /100 WBC (ref 0–0.2)
PLATELET # BLD AUTO: 264 10*3/MM3 (ref 140–450)
PMV BLD AUTO: 9.7 FL (ref 6–12)
POTASSIUM SERPL-SCNC: 5 MMOL/L (ref 3.5–5.2)
PROT SERPL-MCNC: 7.6 G/DL (ref 6–8.5)
RBC # BLD AUTO: 5.19 10*6/MM3 (ref 4.14–5.8)
SODIUM SERPL-SCNC: 135 MMOL/L (ref 136–145)
T4 FREE SERPL-MCNC: 1.2 NG/DL (ref 0.93–1.7)
TIBC SERPL-MCNC: 350 MCG/DL (ref 298–536)
TRANSFERRIN SERPL-MCNC: 235 MG/DL (ref 200–360)
TSH SERPL DL<=0.05 MIU/L-ACNC: 12.1 UIU/ML (ref 0.27–4.2)
WBC NRBC COR # BLD: 6.23 10*3/MM3 (ref 3.4–10.8)

## 2021-12-10 PROCEDURE — 84153 ASSAY OF PSA TOTAL: CPT

## 2021-12-10 PROCEDURE — 84466 ASSAY OF TRANSFERRIN: CPT

## 2021-12-10 PROCEDURE — 83540 ASSAY OF IRON: CPT

## 2021-12-10 PROCEDURE — 80053 COMPREHEN METABOLIC PANEL: CPT | Performed by: FAMILY MEDICINE

## 2021-12-10 PROCEDURE — 36415 COLL VENOUS BLD VENIPUNCTURE: CPT | Performed by: FAMILY MEDICINE

## 2021-12-10 PROCEDURE — 82728 ASSAY OF FERRITIN: CPT

## 2021-12-10 PROCEDURE — 84154 ASSAY OF PSA FREE: CPT

## 2021-12-10 PROCEDURE — 82306 VITAMIN D 25 HYDROXY: CPT

## 2021-12-10 PROCEDURE — 84443 ASSAY THYROID STIM HORMONE: CPT

## 2021-12-10 PROCEDURE — 84439 ASSAY OF FREE THYROXINE: CPT

## 2021-12-10 PROCEDURE — 85025 COMPLETE CBC W/AUTO DIFF WBC: CPT | Performed by: FAMILY MEDICINE

## 2021-12-10 PROCEDURE — 99396 PREV VISIT EST AGE 40-64: CPT | Performed by: FAMILY MEDICINE

## 2021-12-10 RX ORDER — MULTIVIT-MIN/IRON/FOLIC ACID/K 18-600-40
CAPSULE ORAL
COMMUNITY

## 2021-12-10 RX ORDER — MULTIVITAMIN WITH IRON
TABLET ORAL
COMMUNITY

## 2021-12-10 NOTE — ASSESSMENT & PLAN NOTE
He is well controlled at this time. He was given an order for a thyroid profile to be managed according to findings.

## 2021-12-10 NOTE — PROGRESS NOTES
"Chief Complaint  Annual Exam and Follow-up (thyroid )    Subjective          Colton Chiang presents to [facility]  The patient is here today for a follow-up for the management of his chronic medical condtions.  He is  with one daughter.  He has a past medical history significant for tobacco abuse, prostate cancer, erectile dysfunction, hypothyroidism, depression, history of CVA, TB positive test, arthritis, seasonal allergies, morbid obesity, Congestive heart failure, hypertension and hyperlipidemia. He has a family history of breast cancer.     The patient is still continue to smoke.     The patient is continue to take 2-5 mcg Cytomel and 2-125 mcg levothyroxine daily.  His most recent TSH on 6/2/21 was found to be 3.97.  He denies feeling any more fatigued than he usually does.  The patient works a lot of hours and stays tired a lot.     The patient has no other complaints today and denies chest pain, shortness of breath, weakness, numbness, nausea, vomiting, diarrhea, dizziness or syncopal event.      Objective   Vital Signs:   /65 (BP Location: Left arm, Patient Position: Sitting)   Pulse 62   Temp 97.2 °F (36.2 °C)   Resp 18   Ht 180.3 cm (70.98\")   Wt 103 kg (226 lb 9.6 oz)   SpO2 96%   BMI 31.62 kg/m²     Physical Exam  Vitals reviewed.   Constitutional:       Appearance: Normal appearance. He is well-developed. He is obese.   HENT:      Head: Normocephalic and atraumatic.      Right Ear: External ear normal.      Left Ear: External ear normal.      Mouth/Throat:      Pharynx: No oropharyngeal exudate.   Eyes:      Conjunctiva/sclera: Conjunctivae normal.      Pupils: Pupils are equal, round, and reactive to light.   Neck:      Vascular: No carotid bruit.   Cardiovascular:      Rate and Rhythm: Normal rate and regular rhythm.      Heart sounds: No murmur heard.  No friction rub. No gallop.    Pulmonary:      Effort: Pulmonary effort is normal.      " Breath sounds: Normal breath sounds. No wheezing or rhonchi.   Abdominal:      General: There is no distension.   Skin:     General: Skin is warm and dry.   Neurological:      Mental Status: He is alert and oriented to person, place, and time.      Cranial Nerves: No cranial nerve deficit.      Motor: No weakness.   Psychiatric:         Mood and Affect: Mood and affect normal.         Behavior: Behavior normal.         Thought Content: Thought content normal.         Judgment: Judgment normal.        Result Review :     CMP    CMP 8/14/21   Glucose 95   BUN 14   Creatinine 0.76   eGFR Non African Am 104   Sodium 137   Potassium 4.4   Chloride 100   Calcium 8.9   Albumin 4.00   Total Bilirubin 0.3   Alkaline Phosphatase 104   AST (SGOT) 29   ALT (SGPT) 28               Lipid Panel    Lipid Panel 8/14/21   Total Cholesterol 131   Triglycerides 45   HDL Cholesterol 40   VLDL Cholesterol 10   LDL Cholesterol  81   LDL/HDL Ratio 2.05           TSH    TSH 6/2/21   TSH 3.970                     Assessment and Plan    Diagnoses and all orders for this visit:    1. Annual physical exam (Primary)  Comments:  Tobacco cessation, flu vaccination, colonoscopy, covid vacciation and wt. loss were all encouraged today. The patient was clearly explained the risks of nagatin    2. Primary hypertension  Assessment & Plan:  Hypertension is improving with treatment.  Continue current treatment regimen.  Dietary sodium restriction.  Weight loss.  Blood pressure will be reassessed at the next regular appointment.    Orders:  -     Comprehensive Metabolic Panel; Future  -     CBC & Differential; Future    3. Acquired hypothyroidism  Assessment & Plan:  He is well controlled at this time. He was given an order for a thyroid profile to be managed according to findings.     Orders:  -     TSH+Free T4; Future    4. Cigarette nicotine dependence with nicotine-induced disorder  Assessment & Plan:  Tobacco use is unchanged.  Smoking cessation  counseling was provided.  Tobacco use will be reassessed at the next regular appointment.      5. History of prostate cancer  Assessment & Plan:  He is managed by urology and being screened twice per year.       6. Anemia, unspecified type  -     Iron and TIBC; Future  -     Ferritin; Future    Other orders  -     Cancel: PSA Screen; Future      Follow Up   Return in about 6 months (around 6/10/2022).  Patient was given instructions and counseling regarding his condition or for health maintenance advice. Please see specific information pulled into the AVS if appropriate.

## 2021-12-11 LAB
PSA FREE MFR SERPL: 7.4 %
PSA FREE SERPL-MCNC: 0.14 NG/ML
PSA SERPL-MCNC: 1.9 NG/ML (ref 0–4)

## 2021-12-13 ENCOUNTER — TELEPHONE (OUTPATIENT)
Dept: FAMILY MEDICINE CLINIC | Facility: CLINIC | Age: 61
End: 2021-12-13

## 2021-12-13 NOTE — TELEPHONE ENCOUNTER
----- Message from Eduarda Butt DO sent at 12/12/2021  7:00 PM EST -----  1. Normal blood sugar, kidney function, liver enzymes, hemoglobin, WBC's platelets.

## 2021-12-27 ENCOUNTER — TELEPHONE (OUTPATIENT)
Dept: FAMILY MEDICINE CLINIC | Facility: CLINIC | Age: 61
End: 2021-12-27

## 2021-12-28 RX ORDER — ASPIRIN 81 MG/1
81 TABLET, COATED ORAL DAILY
Qty: 90 TABLET | Refills: 1 | Status: SHIPPED | OUTPATIENT
Start: 2021-12-28 | End: 2022-08-12 | Stop reason: SDUPTHER

## 2021-12-28 RX ORDER — ACETAMINOPHEN 160 MG
2000 TABLET,DISINTEGRATING ORAL DAILY
Qty: 90 CAPSULE | Refills: 1 | Status: SHIPPED | OUTPATIENT
Start: 2021-12-28 | End: 2022-07-19

## 2022-01-24 RX ORDER — FERROUS SULFATE 325(65) MG
TABLET ORAL
Qty: 90 TABLET | Refills: 0 | Status: SHIPPED | OUTPATIENT
Start: 2022-01-24 | End: 2022-04-12

## 2022-04-11 RX ORDER — LEVOTHYROXINE SODIUM 0.12 MG/1
125 TABLET ORAL 2 TIMES DAILY
Qty: 60 TABLET | Refills: 11 | Status: SHIPPED | OUTPATIENT
Start: 2022-04-11 | End: 2022-05-26

## 2022-04-11 RX ORDER — LIOTHYRONINE SODIUM 5 UG/1
10 TABLET ORAL DAILY
Qty: 60 TABLET | Refills: 11 | Status: SHIPPED | OUTPATIENT
Start: 2022-04-11 | End: 2023-03-31

## 2022-04-11 NOTE — TELEPHONE ENCOUNTER
Caller: DIONI HAWKINS    Relationship: Emergency Contact    Best call back number: [phone]    Requested Prescriptions:   Requested Prescriptions     Pending Prescriptions Disp Refills   • levothyroxine (SYNTHROID, LEVOTHROID) 125 MCG tablet 60 tablet 11     Sig: Take 1 tablet by mouth 2 (Two) Times a Day.   • liothyronine (CYTOMEL) 5 MCG tablet 60 tablet 11     Sig: Take 2 tablets by mouth Daily.        Pharm[facility] where request should be sent[facility] [address] [facility] [phone] [facility] [phone] FX     Additional details provided by patient: PATIENTS WIFE SAID THE PATIENT IS NEEDING A 90 DAY SUPPLY SENT IT    Does the patient have less than a 3 day supply:  [] Yes  [x] No    Florentin Kennedy Rep   04/11/22 08:34 EDT

## 2022-04-12 RX ORDER — FERROUS SULFATE 325(65) MG
TABLET ORAL
Qty: 90 TABLET | Refills: 0 | Status: SHIPPED | OUTPATIENT
Start: 2022-04-12 | End: 2022-07-26

## 2022-05-06 ENCOUNTER — TRANSCRIBE ORDERS (OUTPATIENT)
Dept: LAB | Facility: HOSPITAL | Age: 62
End: 2022-05-06

## 2022-05-06 ENCOUNTER — LAB (OUTPATIENT)
Dept: LAB | Facility: HOSPITAL | Age: 62
End: 2022-05-06

## 2022-05-06 DIAGNOSIS — E03.9 ACQUIRED HYPOTHYROIDISM: Chronic | ICD-10-CM

## 2022-05-06 DIAGNOSIS — C61 PROSTATE CA: Primary | ICD-10-CM

## 2022-05-06 DIAGNOSIS — C61 PROSTATE CA: ICD-10-CM

## 2022-05-06 LAB
PSA SERPL-MCNC: 1.85 NG/ML (ref 0–4)
T4 FREE SERPL-MCNC: 1.23 NG/DL (ref 0.93–1.7)
TSH SERPL DL<=0.05 MIU/L-ACNC: 8.67 UIU/ML (ref 0.27–4.2)

## 2022-05-06 PROCEDURE — 84443 ASSAY THYROID STIM HORMONE: CPT

## 2022-05-06 PROCEDURE — 84439 ASSAY OF FREE THYROXINE: CPT

## 2022-05-06 PROCEDURE — 84153 ASSAY OF PSA TOTAL: CPT

## 2022-05-06 PROCEDURE — 36415 COLL VENOUS BLD VENIPUNCTURE: CPT

## 2022-05-12 DIAGNOSIS — E03.9 ACQUIRED HYPOTHYROIDISM: Primary | ICD-10-CM

## 2022-05-26 RX ORDER — LEVOTHYROXINE SODIUM 0.12 MG/1
TABLET ORAL
Qty: 60 TABLET | Refills: 11 | Status: SHIPPED | OUTPATIENT
Start: 2022-05-26 | End: 2022-12-12

## 2022-06-10 ENCOUNTER — OFFICE VISIT (OUTPATIENT)
Dept: FAMILY MEDICINE CLINIC | Facility: CLINIC | Age: 62
End: 2022-06-10

## 2022-06-10 VITALS
SYSTOLIC BLOOD PRESSURE: 110 MMHG | DIASTOLIC BLOOD PRESSURE: 60 MMHG | BODY MASS INDEX: 30.9 KG/M2 | WEIGHT: 220.7 LBS | RESPIRATION RATE: 18 BRPM | HEART RATE: 60 BPM | HEIGHT: 71 IN | OXYGEN SATURATION: 95 % | TEMPERATURE: 97.7 F

## 2022-06-10 DIAGNOSIS — I10 PRIMARY HYPERTENSION: Chronic | ICD-10-CM

## 2022-06-10 DIAGNOSIS — E03.9 ACQUIRED HYPOTHYROIDISM: Chronic | ICD-10-CM

## 2022-06-10 DIAGNOSIS — F17.219 CIGARETTE NICOTINE DEPENDENCE WITH NICOTINE-INDUCED DISORDER: Chronic | ICD-10-CM

## 2022-06-10 DIAGNOSIS — Z12.5 SCREENING FOR PROSTATE CANCER: ICD-10-CM

## 2022-06-10 DIAGNOSIS — E66.09 CLASS 1 OBESITY DUE TO EXCESS CALORIES WITHOUT SERIOUS COMORBIDITY WITH BODY MASS INDEX (BMI) OF 30.0 TO 30.9 IN ADULT: Chronic | ICD-10-CM

## 2022-06-10 DIAGNOSIS — Z13.220 SCREENING FOR LIPID DISORDERS: ICD-10-CM

## 2022-06-10 DIAGNOSIS — D50.9 IRON DEFICIENCY ANEMIA, UNSPECIFIED IRON DEFICIENCY ANEMIA TYPE: ICD-10-CM

## 2022-06-10 DIAGNOSIS — Z00.00 ANNUAL PHYSICAL EXAM: Primary | ICD-10-CM

## 2022-06-10 PROBLEM — E66.811 CLASS 1 OBESITY DUE TO EXCESS CALORIES WITHOUT SERIOUS COMORBIDITY WITH BODY MASS INDEX (BMI) OF 30.0 TO 30.9 IN ADULT: Chronic | Status: ACTIVE | Noted: 2021-06-11

## 2022-06-10 PROBLEM — E66.811 CLASS 1 OBESITY DUE TO EXCESS CALORIES WITHOUT SERIOUS COMORBIDITY WITH BODY MASS INDEX (BMI) OF 30.0 TO 30.9 IN ADULT: Status: ACTIVE | Noted: 2021-06-11

## 2022-06-10 PROCEDURE — 99396 PREV VISIT EST AGE 40-64: CPT | Performed by: FAMILY MEDICINE

## 2022-06-10 PROCEDURE — 99214 OFFICE O/P EST MOD 30 MIN: CPT | Performed by: FAMILY MEDICINE

## 2022-06-10 NOTE — ASSESSMENT & PLAN NOTE
He was told to wear his seat belt. He was told to not text and drive. He was encourage to lose wt. He was encouraged stop smoking. Labs were ordered to be managed according to findings.

## 2022-06-10 NOTE — PROGRESS NOTES
"Chief Complaint  Hypothyroidism    Subjective        Colton Chiang presents to [facility]  The patient is here today for a follow-up for the management of his chronic medical condtions.  He is  with one daughter.  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 He has a family history of breast cancer.     The patient is still continue to smoke.     The patient is continue to take 2-5 mcg Cytomel and 2-125 mcg levothyroxine daily.  His most recent TSH on 6/2/21 was found to be 3.97.  He denies feeling any more fatigued than he usually does.  The patient works a lot of hours and stays tired a lot.     The patient has no other complaints today and denies chest pain, shortness of breath, weakness, numbness, nausea, vomiting, diarrhea, dizziness or syncopal event.      Objective   Vital Signs:  /60 (BP Location: Left arm, Patient Position: Sitting)   Pulse 60   Temp 97.7 °F (36.5 °C)   Resp 18   Ht 180.3 cm (70.98\")   Wt 100 kg (220 lb 11.2 oz)   SpO2 95%   BMI 30.80 kg/m²   Estimated body mass index is 30.8 kg/m² as calculated from the following:    Height as of this encounter: 180.3 cm (70.98\").    Weight as of this encounter: 100 kg (220 lb 11.2 oz).          Physical Exam  Vitals reviewed.   Constitutional:       Appearance: Normal appearance. He is well-developed. He is obese.   HENT:      Head: Normocephalic and atraumatic.      Right Ear: External ear normal.      Left Ear: External ear normal.      Mouth/Throat:      Pharynx: No oropharyngeal exudate.   Eyes:      Conjunctiva/sclera: Conjunctivae normal.      Pupils: Pupils are equal, round, and reactive to light.   Neck:      Vascular: No carotid bruit.   Cardiovascular:      Rate and Rhythm: Normal rate and regular rhythm.    "   Heart sounds: No murmur heard.    No friction rub. No gallop.   Pulmonary:      Effort: Pulmonary effort is normal.      Breath sounds: Normal breath sounds. No wheezing or rhonchi.   Abdominal:      General: There is no distension.   Skin:     General: Skin is warm and dry.   Neurological:      Mental Status: He is alert and oriented to person, place, and time.      Cranial Nerves: No cranial nerve deficit.      Motor: No weakness.   Psychiatric:         Mood and Affect: Mood and affect normal.         Behavior: Behavior normal.         Thought Content: Thought content normal.         Judgment: Judgment normal.        Result Review :    CMP    CMP 8/14/21 12/10/21   Glucose 95 86   BUN 14 13   Creatinine 0.76 0.85   eGFR Non African Am 104 91   Sodium 137 135 (A)   Potassium 4.4 5.0   Chloride 100 96 (A)   Calcium 8.9 9.1   Albumin 4.00 4.10   Total Bilirubin 0.3 0.3   Alkaline Phosphatase 104 97   AST (SGOT) 29 30   ALT (SGPT) 28 32   (A) Abnormal value            CBC    CBC 12/10/21   WBC 6.23   RBC 5.19   Hemoglobin 14.2   Hematocrit 43.2   MCV 83.2   MCH 27.4   MCHC 32.9   RDW 13.4   Platelets 264           Lipid Panel    Lipid Panel 8/14/21   Total Cholesterol 131   Triglycerides 45   HDL Cholesterol 40   VLDL Cholesterol 10   LDL Cholesterol  81   LDL/HDL Ratio 2.05           TSH    TSH 12/10/21 5/6/22   TSH 12.100 (A) 8.670 (A)   (A) Abnormal value                      Assessment and Plan   Diagnoses and all orders for this visit:    1. Annual physical exam (Primary)  Assessment & Plan:  He was told to wear his seat belt. He was told to not text and drive. He was encourage to lose wt. He was encouraged stop smoking. Labs were ordered to be managed according to findings.       2. Acquired hypothyroidism  Assessment & Plan:  He is doing well with 125 mcg of synthroid daily and cytomel 5 mcg daily. We will recheck in 6 months.     Orders:  -     CBC & Differential; Future  -     TSH+Free T4; Future    3.  Cigarette nicotine dependence with nicotine-induced disorder  Assessment & Plan:  Tobacco use is unchanged.  Smoking cessation counseling was provided.  Tobacco use will be reassessed at the next regular appointment.      4. Primary hypertension  Assessment & Plan:  Hypertension is improving with treatment.  Continue current treatment regimen.  Weight loss.  Blood pressure will be reassessed at the next regular appointment.    Orders:  -     Comprehensive Metabolic Panel; Future    5. Class 1 obesity due to excess calories without serious comorbidity with body mass index (BMI) of 30.0 to 30.9 in adult  Assessment & Plan:  Patient's (Body mass index is 30.8 kg/m².) indicates that they are obese (BMI >30) with health conditions that include hypertension . Weight is unchanged. BMI is is above average; BMI management plan is completed. We discussed low calorie, low carb based diet program, portion control and increasing exercise.       6. Iron deficiency anemia, unspecified iron deficiency anemia type  -     Iron and TIBC; Future  -     Ferritin; Future  -     Vitamin B12; Future  -     Folate; Future    7. Screening for lipid disorders  -     Lipid Panel; Future           Follow Up   No follow-ups on file.  Patient was given instructions and counseling regarding his condition or for health maintenance advice. Please see specific information pulled into the AVS if appropriate.

## 2022-06-10 NOTE — ASSESSMENT & PLAN NOTE
Patient's (Body mass index is 30.8 kg/m².) indicates that they are obese (BMI >30) with health conditions that include hypertension . Weight is unchanged. BMI is is above average; BMI management plan is completed. We discussed low calorie, low carb based diet program, portion control and increasing exercise.

## 2022-06-10 NOTE — ASSESSMENT & PLAN NOTE
He is doing well with 250 mcg of synthroid daily and cytomel 5 mcg daily. We will recheck in 6 months.

## 2022-06-21 ENCOUNTER — TELEPHONE (OUTPATIENT)
Dept: FAMILY MEDICINE CLINIC | Facility: CLINIC | Age: 62
End: 2022-06-21

## 2022-07-19 RX ORDER — ACETAMINOPHEN 160 MG
TABLET,DISINTEGRATING ORAL
Qty: 90 CAPSULE | Refills: 1 | Status: SHIPPED | OUTPATIENT
Start: 2022-07-19 | End: 2023-01-30

## 2022-07-26 DIAGNOSIS — I10 ESSENTIAL HYPERTENSION: ICD-10-CM

## 2022-07-26 DIAGNOSIS — E78.49 OTHER HYPERLIPIDEMIA: ICD-10-CM

## 2022-07-26 RX ORDER — FUROSEMIDE 40 MG/1
TABLET ORAL
Qty: 90 TABLET | Refills: 3 | Status: SHIPPED | OUTPATIENT
Start: 2022-07-26

## 2022-07-26 RX ORDER — METOPROLOL SUCCINATE 25 MG/1
TABLET, EXTENDED RELEASE ORAL
Qty: 45 TABLET | Refills: 3 | Status: SHIPPED | OUTPATIENT
Start: 2022-07-26

## 2022-07-26 RX ORDER — ATORVASTATIN CALCIUM 10 MG/1
10 TABLET, FILM COATED ORAL DAILY
Qty: 90 TABLET | Refills: 3 | Status: SHIPPED | OUTPATIENT
Start: 2022-07-26

## 2022-07-26 RX ORDER — FERROUS SULFATE 325(65) MG
TABLET ORAL
Qty: 90 TABLET | Refills: 0 | Status: SHIPPED | OUTPATIENT
Start: 2022-07-26 | End: 2022-11-08

## 2022-08-02 ENCOUNTER — HOSPITAL ENCOUNTER (OUTPATIENT)
Dept: GENERAL RADIOLOGY | Facility: HOSPITAL | Age: 62
Discharge: HOME | End: 2022-08-02
Admitting: FAMILY MEDICINE

## 2022-08-02 ENCOUNTER — TELEPHONE (OUTPATIENT)
Dept: FAMILY MEDICINE CLINIC | Facility: CLINIC | Age: 62
End: 2022-08-02

## 2022-08-02 DIAGNOSIS — M79.602 PAIN OF LEFT UPPER EXTREMITY: ICD-10-CM
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
## 2022-08-02 PROCEDURE — 73090 X-RAY EXAM OF FOREARM: CPT

## 2022-08-02 PROCEDURE — 73060 X-RAY EXAM OF HUMERUS: CPT

## 2022-08-02 PROCEDURE — 73070 X-RAY EXAM OF ELBOW: CPT

## 2022-08-02 PROCEDURE — 73030 X-RAY EXAM OF SHOULDER: CPT

## 2022-08-02 NOTE — TELEPHONE ENCOUNTER
Caller: Colton Chiang    Relationship: Self    Best call back number: [phone]    Caller requesting test results: PATIENT    What test was performed: XRAY    When was the test performed: TODAY    Where was the test performed: IN OFFICE    Additional notes: PATIENT WOULD LIKE TO KNOW WHAT HIS RESULTS ARE.

## 2022-08-05 ENCOUNTER — TELEPHONE (OUTPATIENT)
Dept: FAMILY MEDICINE CLINIC | Facility: CLINIC | Age: 62
End: 2022-08-05

## 2022-08-05 NOTE — TELEPHONE ENCOUNTER
Caller: DIONI HAWKINS    Relationship: Emergency Contact    Best call back number:[phone] PERMISSION TO LEAVE A MESSAGE ON THE RECORDER     What is the best time to reach you: ANY TIME     Who are you requesting to speak with (clinical staff, provider,  specific staff member): CLINCIAL         What was the call regarding: WIFE STATES THAT [facility] [address] - [phone] [facility] [phone] FX  INFORMED THEM THAT THEY HAVE SENT A FAX OVER NUMEROUS TIMES FOR A MEDICATION REFILL.  WIFE STATES THAT THEY  DID NOT SPECIFY WHAT MEDICATION WAS BEING REQUESTED. THEREFORE, WIFE WAS CALLING IN TO CHECK TO SEE IF THE OFFICE HAD RECEIVED THE FAX AND IF SO TO PLEASE COMPLETE AND FAX BACK. WIFE STATES THAT IT MAY BE THE PATIENT'S  ASPRIN.       Do you require a callback: YES     DIONI HAWKINS IS ON THE  VERBAL

## 2022-08-09 ENCOUNTER — LAB (OUTPATIENT)
Dept: LAB | Facility: HOSPITAL | Age: 62
End: 2022-08-09

## 2022-08-09 DIAGNOSIS — E78.49 OTHER HYPERLIPIDEMIA: ICD-10-CM

## 2022-08-09 DIAGNOSIS — D64.9 ANEMIA, UNSPECIFIED TYPE: ICD-10-CM

## 2022-08-09 DIAGNOSIS — Z13.220 SCREENING FOR LIPID DISORDERS: ICD-10-CM

## 2022-08-09 DIAGNOSIS — I10 PRIMARY HYPERTENSION: Chronic | ICD-10-CM

## 2022-08-09 DIAGNOSIS — E03.9 ACQUIRED HYPOTHYROIDISM: ICD-10-CM

## 2022-08-09 DIAGNOSIS — I10 ESSENTIAL HYPERTENSION: ICD-10-CM

## 2022-08-09 DIAGNOSIS — D50.9 IRON DEFICIENCY ANEMIA, UNSPECIFIED IRON DEFICIENCY ANEMIA TYPE: ICD-10-CM

## 2022-08-09 LAB
ALBUMIN SERPL-MCNC: 3.9 G/DL (ref 3.5–5.2)
ALBUMIN/GLOB SERPL: 1.3 G/DL
ALP SERPL-CCNC: 101 U/L (ref 39–117)
ALT SERPL W P-5'-P-CCNC: 31 U/L (ref 1–41)
ANION GAP SERPL CALCULATED.3IONS-SCNC: 12.2 MMOL/L (ref 5–15)
AST SERPL-CCNC: 29 U/L (ref 1–40)
BASOPHILS # BLD AUTO: 0.07 10*3/MM3 (ref 0–0.2)
BASOPHILS NFR BLD AUTO: 1.2 % (ref 0–1.5)
BILIRUB SERPL-MCNC: 0.3 MG/DL (ref 0–1.2)
BUN SERPL-MCNC: 13 MG/DL (ref 8–23)
BUN/CREAT SERPL: 18.6 (ref 7–25)
CALCIUM SPEC-SCNC: 9.3 MG/DL (ref 8.6–10.5)
CHLORIDE SERPL-SCNC: 95 MMOL/L (ref 98–107)
CHOLEST SERPL-MCNC: 141 MG/DL (ref 0–200)
CO2 SERPL-SCNC: 27.8 MMOL/L (ref 22–29)
CREAT SERPL-MCNC: 0.7 MG/DL (ref 0.76–1.27)
DEPRECATED RDW RBC AUTO: 40.1 FL (ref 37–54)
EGFRCR SERPLBLD CKD-EPI 2021: 102.9 ML/MIN/1.73
EOSINOPHIL # BLD AUTO: 0.26 10*3/MM3 (ref 0–0.4)
EOSINOPHIL NFR BLD AUTO: 4.3 % (ref 0.3–6.2)
ERYTHROCYTE [DISTWIDTH] IN BLOOD BY AUTOMATED COUNT: 13 % (ref 12.3–15.4)
FERRITIN SERPL-MCNC: 203 NG/ML (ref 30–400)
FOLATE SERPL-MCNC: 16.6 NG/ML (ref 4.78–24.2)
GLOBULIN UR ELPH-MCNC: 3 GM/DL
GLUCOSE SERPL-MCNC: 78 MG/DL (ref 65–99)
HCT VFR BLD AUTO: 41.3 % (ref 37.5–51)
HDLC SERPL-MCNC: 36 MG/DL (ref 40–60)
HGB BLD-MCNC: 13.6 G/DL (ref 13–17.7)
IMM GRANULOCYTES # BLD AUTO: 0.03 10*3/MM3 (ref 0–0.05)
IMM GRANULOCYTES NFR BLD AUTO: 0.5 % (ref 0–0.5)
IRON 24H UR-MRATE: 45 MCG/DL (ref 59–158)
IRON SATN MFR SERPL: 14 % (ref 20–50)
LDLC SERPL CALC-MCNC: 91 MG/DL (ref 0–100)
LDLC/HDLC SERPL: 2.53 {RATIO}
LYMPHOCYTES # BLD AUTO: 1.36 10*3/MM3 (ref 0.7–3.1)
LYMPHOCYTES NFR BLD AUTO: 22.7 % (ref 19.6–45.3)
MAGNESIUM SERPL-MCNC: 2.1 MG/DL (ref 1.6–2.4)
MCH RBC QN AUTO: 27.8 PG (ref 26.6–33)
MCHC RBC AUTO-ENTMCNC: 32.9 G/DL (ref 31.5–35.7)
MCV RBC AUTO: 84.5 FL (ref 79–97)
MONOCYTES # BLD AUTO: 0.53 10*3/MM3 (ref 0.1–0.9)
MONOCYTES NFR BLD AUTO: 8.8 % (ref 5–12)
NEUTROPHILS NFR BLD AUTO: 3.74 10*3/MM3 (ref 1.7–7)
NEUTROPHILS NFR BLD AUTO: 62.5 % (ref 42.7–76)
NRBC BLD AUTO-RTO: 0 /100 WBC (ref 0–0.2)
PLATELET # BLD AUTO: 228 10*3/MM3 (ref 140–450)
PMV BLD AUTO: 9.6 FL (ref 6–12)
POTASSIUM SERPL-SCNC: 4.4 MMOL/L (ref 3.5–5.2)
PROT SERPL-MCNC: 6.9 G/DL (ref 6–8.5)
RBC # BLD AUTO: 4.89 10*6/MM3 (ref 4.14–5.8)
SODIUM SERPL-SCNC: 135 MMOL/L (ref 136–145)
T-UPTAKE NFR SERPL: 1.11 TBI (ref 0.8–1.3)
T4 FREE SERPL-MCNC: 1.08 NG/DL (ref 0.93–1.7)
T4 SERPL-MCNC: 7.42 MCG/DL (ref 4.5–11.7)
TIBC SERPL-MCNC: 311 MCG/DL (ref 298–536)
TRANSFERRIN SERPL-MCNC: 209 MG/DL (ref 200–360)
TRIGL SERPL-MCNC: 70 MG/DL (ref 0–150)
TSH SERPL DL<=0.05 MIU/L-ACNC: 15.5 UIU/ML (ref 0.27–4.2)
VIT B12 BLD-MCNC: 697 PG/ML (ref 211–946)
VLDLC SERPL-MCNC: 14 MG/DL (ref 5–40)
WBC NRBC COR # BLD: 5.99 10*3/MM3 (ref 3.4–10.8)

## 2022-08-09 PROCEDURE — 83540 ASSAY OF IRON: CPT

## 2022-08-09 PROCEDURE — 82746 ASSAY OF FOLIC ACID SERUM: CPT

## 2022-08-09 PROCEDURE — 82607 VITAMIN B-12: CPT

## 2022-08-09 PROCEDURE — 84466 ASSAY OF TRANSFERRIN: CPT

## 2022-08-09 PROCEDURE — 36415 COLL VENOUS BLD VENIPUNCTURE: CPT

## 2022-08-09 PROCEDURE — 84436 ASSAY OF TOTAL THYROXINE: CPT

## 2022-08-09 PROCEDURE — 80061 LIPID PANEL: CPT

## 2022-08-09 PROCEDURE — 84479 ASSAY OF THYROID (T3 OR T4): CPT

## 2022-08-09 PROCEDURE — 82728 ASSAY OF FERRITIN: CPT

## 2022-08-09 PROCEDURE — 83735 ASSAY OF MAGNESIUM: CPT

## 2022-08-09 PROCEDURE — 80050 GENERAL HEALTH PANEL: CPT

## 2022-08-09 PROCEDURE — 84439 ASSAY OF FREE THYROXINE: CPT

## 2022-08-12 RX ORDER — ASPIRIN 81 MG/1
81 TABLET, COATED ORAL DAILY
Qty: 90 TABLET | Refills: 2 | Status: SHIPPED | OUTPATIENT
Start: 2022-08-12

## 2022-08-12 RX ORDER — FERROUS SULFATE 325(65) MG
TABLET ORAL
Qty: 45 TABLET | Refills: 1 | Status: SHIPPED | OUTPATIENT
Start: 2022-08-12 | End: 2022-08-17 | Stop reason: ALTCHOICE

## 2022-08-17 ENCOUNTER — OFFICE VISIT (OUTPATIENT)
Dept: CARDIOLOGY | Facility: CLINIC | Age: 62
End: 2022-08-17

## 2022-08-17 VITALS
BODY MASS INDEX: 31.5 KG/M2 | WEIGHT: 225 LBS | HEIGHT: 71 IN | SYSTOLIC BLOOD PRESSURE: 99 MMHG | DIASTOLIC BLOOD PRESSURE: 57 MMHG | HEART RATE: 62 BPM

## 2022-08-17 DIAGNOSIS — I10 PRIMARY HYPERTENSION: Chronic | ICD-10-CM

## 2022-08-17 DIAGNOSIS — E66.09 CLASS 1 OBESITY DUE TO EXCESS CALORIES WITHOUT SERIOUS COMORBIDITY WITH BODY MASS INDEX (BMI) OF 30.0 TO 30.9 IN ADULT: Chronic | ICD-10-CM

## 2022-08-17 DIAGNOSIS — I50.32 CHRONIC DIASTOLIC (CONGESTIVE) HEART FAILURE: Primary | Chronic | ICD-10-CM

## 2022-08-17 DIAGNOSIS — F17.219 CIGARETTE NICOTINE DEPENDENCE WITH NICOTINE-INDUCED DISORDER: Chronic | ICD-10-CM

## 2022-08-17 DIAGNOSIS — E78.5 HYPERLIPIDEMIA LDL GOAL <100: ICD-10-CM

## 2022-08-17 PROCEDURE — 99214 OFFICE O/P EST MOD 30 MIN: CPT | Performed by: INTERNAL MEDICINE

## 2022-08-17 NOTE — PROGRESS NOTES
Office Visit    Chief Complaint  Congestive Heart Failure and Hypertension    Subjective            Colton Chiang presents to [facility]  Kei is a 62 years old gentleman with prior history of dilated cardiomyopathy hypertension hyperlipidemia obesity who is done very well over time.  His LV ejection fraction has normalized and he is doing well.  He denies chest pain palpitation shortness of breath dizziness syncope      Past Medical History:   Diagnosis Date   • Allergies    • Arthritis    • Blood disease    • Broken bones     Cracked   • CHF (congestive heart failure) ([facility]) 1990   • Chronic back pain    • Deafness, bilateral    • Depression    • HBP (high blood pressure)    • Heart disease    • High cholesterol    • History of radiation therapy    • Hypothyroid 02/10/2017   • Prostate cancer ([facility]) 02/05/2016   • 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, bilateral    • Stroke ([facility])    • Thyroid disorder    • 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        Allergies   Allergen Reactions   • Penicillins Irritability        Past Surgical History:   Procedure Laterality Date   • COLONOSCOPY     • PROSTATE BIOPSY          Social History     Tobacco Use   • Smoking status: Current Every Day Smoker     Packs/day: 1.50   • Smokeless tobacco: Never Used   Vaping Use   • Vaping Use: Never used   Substance Use Topics   • Alcohol use: Never   • Drug use: Never       Family History   Problem Relation Age of Onset   • Breast cancer Sister    • Heart disease Other    • Diabetes Other         Unspecified        Prior to Admission medications    Medication Sig Start Date End Date Taking? Authorizing Provider   Ascorbic Acid (Vitamin C) 500 MG capsule Take  by mouth.   Yes Provider, MD Juan Luis   Aspirin Low Dose 81 MG EC tablet Take 1 tablet by mouth Daily. 8/12/22  Yes Eduarda Butt DO   atorvastatin (LIPITOR) 10 MG tablet Take 1 tablet by mouth Daily. 7/26/22  Yes Camilla Dang Indu, PRIMO   Cholecalciferol (Vitamin D3) 50 MCG  "(2000 UT) capsule TAKE 1 CAPSULE BY MOUTH EVERY DAY 7/19/22  Yes Eduarda Butt DO   diphenhydrAMINE (BENADRYL) 25 mg capsule Take 25 mg by mouth Every 6 (Six) Hours As Needed for Itching. Take 1/2 tablet   Yes Juan Luis Davis MD   FeroSul 325 (65 Fe) MG tablet TAKE 1 TABLET BY MOUTH EVERY DAY 7/26/22  Yes Eduarda Butt DO   furosemide (LASIX) 40 MG tablet Take a half a tab 1 day and a whole tab the next day.  Can take an extra half on a as needed basis 7/26/22  Yes Camilla Dang June, APRFRANSISCO   levothyroxine (SYNTHROID, LEVOTHROID) 125 MCG tablet TAKE 1 TABLET BY MOUTH TWICE DAILY 5/26/22  Yes Eduarda Butt DO   liothyronine (CYTOMEL) 5 MCG tablet Take 2 tablets by mouth Daily. 4/11/22  Yes Eduarda Butt DO   Loratadine 10 MG capsule Take 10 mg by mouth.   Yes Juan Luis Davis MD   Magnesium 250 MG tablet Take  by mouth.   Yes Juan Luis Davis MD   metoprolol succinate XL (TOPROL-XL) 25 MG 24 hr tablet Take 1/2 tab po qd 7/26/22  Yes Camilla Dang June, APRFRANSISCO   multivitamin with minerals tablet tablet Take 1 tablet by mouth Daily.   Yes Juan Luis Davis MD   Zinc 50 MG capsule Take  by mouth.   Yes Juan Luis Davis MD   ferrous sulfate (FerrouSul) 325 (65 FE) MG tablet Take one in the am every other day. 8/12/22   Eduarda Butt DO        Review of Systems   Constitutional: Negative for fatigue.   Respiratory: Negative for cough and shortness of breath.    Cardiovascular: Negative for chest pain, palpitations and leg swelling.   Neurological: Negative for dizziness.        Objective     BP 99/57   Pulse 62   Ht 180.3 cm (71\")   Wt 102 kg (225 lb)   BMI 31.38 kg/m²       Physical Exam  Constitutional:       General: He is awake.      Appearance: Normal appearance.   Neck:      Thyroid: No thyromegaly.      Vascular: No carotid bruit or JVD.   Cardiovascular:      Rate and Rhythm: Normal rate and regular rhythm.      Chest Wall: PMI is not displaced.      Pulses: Normal pulses.      Heart " sounds: Normal heart sounds, S1 normal and S2 normal. No murmur heard.    No friction rub. No gallop. No S3 or S4 sounds.   Pulmonary:      Effort: Pulmonary effort is normal.      Breath sounds: Normal breath sounds and air entry. No wheezing, rhonchi or rales.   Abdominal:      General: Bowel sounds are normal.      Palpations: Abdomen is soft. There is no mass.      Tenderness: There is no abdominal tenderness.   Musculoskeletal:      Cervical back: Neck supple.      Right lower leg: No edema.      Left lower leg: No edema.   Neurological:      Mental Status: He is alert and oriented to person, place, and time.   Psychiatric:         Mood and Affect: Mood normal.         Behavior: Behavior is cooperative.           Result Review :                      No results found for: PROBNP, BNP  CMP    CMP 12/10/21 8/9/22   Glucose 86 78   BUN 13 13   Creatinine 0.85 0.70 (A)   eGFR Non African Am 91    Sodium 135 (A) 135 (A)   Potassium 5.0 4.4   Chloride 96 (A) 95 (A)   Calcium 9.1 9.3   Albumin 4.10 3.90   Total Bilirubin 0.3 0.3   Alkaline Phosphatase 97 101   AST (SGOT) 30 29   ALT (SGPT) 32 31   (A) Abnormal value            CBC w/diff    CBC w/Diff 12/10/21 8/9/22   WBC 6.23 5.99   RBC 5.19 4.89   Hemoglobin 14.2 13.6   Hematocrit 43.2 41.3   MCV 83.2 84.5   MCH 27.4 27.8   MCHC 32.9 32.9   RDW 13.4 13.0   Platelets 264 228   Neutrophil Rel % 60.2 62.5   Immature Granulocyte Rel % 0.3 0.5   Lymphocyte Rel % 22.8 22.7   Monocyte Rel % 8.8 8.8   Eosinophil Rel % 6.6 (A) 4.3   Basophil Rel % 1.3 1.2   (A) Abnormal value             Lipid Panel    Lipid Panel 8/9/22   Total Cholesterol 141   Triglycerides 70   HDL Cholesterol 36 (A)   VLDL Cholesterol 14   LDL Cholesterol  91   LDL/HDL Ratio 2.53   (A) Abnormal value             Lab Results   Component Value Date    TSH 15.500 (H) 08/09/2022    TSH 8.670 (H) 05/06/2022    TSH 12.100 (H) 12/10/2021      Lab Results   Component Value Date    FREET4 1.08 08/09/2022     FREET4 1.23 05/06/2022    FREET4 1.20 12/10/2021      No results found for: DDIMERQUANT  Magnesium   Date Value Ref Range Status   08/09/2022 2.1 1.6 - 2.4 mg/dL Final      No results found for: DIGOXIN               Assessment and Plan        Diagnoses and all orders for this visit:    1. Chronic diastolic (congestive) heart failure (HCC) (Primary)  Assessment & Plan:  With the improvement in his ejection fraction he is gone from chronic combined congestive heart failure to chronic diastolic heart failure.  He is currently stable and class I-II.  His blood pressure runs on the low side and he has not been able to tolerate any ACE or ARB lately.  We will get an echocardiogram but he wants to wait until December before doing the test.  He will continue the metoprolol and furosemide in the current dosage    Orders:  -     Adult Transthoracic Echo Complete W/ Cont if Necessary Per Protocol; Future  -     Lipid Panel; Future  -     Comprehensive Metabolic Panel; Future  -     Magnesium; Future    2. Primary hypertension  -     Lipid Panel; Future  -     Comprehensive Metabolic Panel; Future  -     Magnesium; Future    3. Class 1 obesity due to excess calories without serious comorbidity with body mass index (BMI) of 30.0 to 30.9 in adult  Assessment & Plan:  Over the years he has lost quite a bit of weight and feels a lot better.  I have encouraged him to continue to lose more weight and try to achieve ideal body weight which would be a BMI of 25 or below    Orders:  -     Lipid Panel; Future  -     Comprehensive Metabolic Panel; Future  -     Magnesium; Future    4. Cigarette nicotine dependence with nicotine-induced disorder  Assessment & Plan:  He continues to smoke cigarettes and does not demonstrate any desire to quit.  Smoking cessation counseling was attempted but failed      5. Hyperlipidemia LDL goal <100  Assessment & Plan:  His LDL is at goal on atorvastatin 10 mg.  He will continue with his current  dosage            Follow Up     Return for FU 9 [facility].  ECHO IN DECEMBER.    Patient was given instructions and counseling regarding his condition or for health maintenance advice. Please see specific information pulled into the AVS if appropriate.     Diane Dickson MD  08/17/22 08:04 EDT

## 2022-08-17 NOTE — ASSESSMENT & PLAN NOTE
Over the years he has lost quite a bit of weight and feels a lot better.  I have encouraged him to continue to lose more weight and try to achieve ideal body weight which would be a BMI of 25 or below

## 2022-08-17 NOTE — ASSESSMENT & PLAN NOTE
He continues to smoke cigarettes and does not demonstrate any desire to quit.  Smoking cessation counseling was attempted but failed

## 2022-08-17 NOTE — ASSESSMENT & PLAN NOTE
With the improvement in his ejection fraction he is gone from chronic combined congestive heart failure to chronic diastolic heart failure.  He is currently stable and class I-II.  His blood pressure runs on the low side and he has not been able to tolerate any ACE or ARB lately.  We will get an echocardiogram but he wants to wait until December before doing the test.  He will continue the metoprolol and furosemide in the current dosage

## 2022-09-01 ENCOUNTER — HOSPITAL ENCOUNTER (OUTPATIENT)
Dept: GENERAL RADIOLOGY | Facility: HOSPITAL | Age: 62
Discharge: HOME | End: 2022-09-01
Admitting: FAMILY MEDICINE

## 2022-09-01 ENCOUNTER — TELEPHONE (OUTPATIENT)
Dept: FAMILY MEDICINE CLINIC | Facility: CLINIC | Age: 62
End: 2022-09-01

## 2022-09-01 DIAGNOSIS — M79.604 RIGHT LEG PAIN: Primary | ICD-10-CM

## 2022-09-01 DIAGNOSIS — M79.605 LEFT LEG PAIN: ICD-10-CM

## 2022-09-01 DIAGNOSIS — M79.604 RIGHT LEG PAIN: ICD-10-CM

## 2022-09-01 PROCEDURE — 73521 X-RAY EXAM HIPS BI 2 VIEWS: CPT

## 2022-09-01 PROCEDURE — 73590 X-RAY EXAM OF LOWER LEG: CPT

## 2022-09-01 PROCEDURE — 73562 X-RAY EXAM OF KNEE 3: CPT

## 2022-09-08 ENCOUNTER — OFFICE VISIT (OUTPATIENT)
Dept: FAMILY MEDICINE CLINIC | Facility: CLINIC | Age: 62
End: 2022-09-08

## 2022-09-08 VITALS
BODY MASS INDEX: 32 KG/M2 | TEMPERATURE: 97.1 F | HEART RATE: 74 BPM | DIASTOLIC BLOOD PRESSURE: 70 MMHG | HEIGHT: 71 IN | OXYGEN SATURATION: 96 % | SYSTOLIC BLOOD PRESSURE: 140 MMHG | RESPIRATION RATE: 18 BRPM | WEIGHT: 228.6 LBS

## 2022-09-08 DIAGNOSIS — I10 PRIMARY HYPERTENSION: Chronic | ICD-10-CM

## 2022-09-08 DIAGNOSIS — M15.9 PRIMARY OSTEOARTHRITIS INVOLVING MULTIPLE JOINTS: Primary | Chronic | ICD-10-CM

## 2022-09-08 DIAGNOSIS — F17.219 CIGARETTE NICOTINE DEPENDENCE WITH NICOTINE-INDUCED DISORDER: Chronic | ICD-10-CM

## 2022-09-08 DIAGNOSIS — E03.9 ACQUIRED HYPOTHYROIDISM: Chronic | ICD-10-CM

## 2022-09-08 DIAGNOSIS — E66.09 CLASS 1 OBESITY DUE TO EXCESS CALORIES WITH SERIOUS COMORBIDITY AND BODY MASS INDEX (BMI) OF 31.0 TO 31.9 IN ADULT: Chronic | ICD-10-CM

## 2022-09-08 PROBLEM — E66.811 CLASS 1 OBESITY DUE TO EXCESS CALORIES WITHOUT SERIOUS COMORBIDITY WITH BODY MASS INDEX (BMI) OF 30.0 TO 30.9 IN ADULT: Chronic | Status: RESOLVED | Noted: 2021-06-11 | Resolved: 2022-09-08

## 2022-09-08 PROCEDURE — 99214 OFFICE O/P EST MOD 30 MIN: CPT | Performed by: FAMILY MEDICINE

## 2022-09-08 NOTE — ASSESSMENT & PLAN NOTE
Patient's (Body mass index is 31.9 kg/m².) indicates that they are obese (BMI >30) with health conditions that include hypertension and dyslipidemias . Weight is improving with lifestyle modifications. BMI is is above average; BMI management plan is completed. We discussed low calorie, low carb based diet program, portion control and increasing exercise.

## 2022-09-08 NOTE — PROGRESS NOTES
"Chief Complaint  Hip Pain and Leg Pain    Subjective        Colton Chiang presents to [facility]  The patient is here today for a follow-up for the management of his chronic medical conditions and acute condition.  He is  with one daughter.  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 He has a family history of breast cancer.     The patient is still continuing to smoke.     The patient is continue to take 2-5 mcg Cytomel and 2-125 mcg levothyroxine daily.  His most recent TSH on 8/9/22 was found to be 1.08.  He denies feeling any more fatigued than he usually does.  The patient works a lot of hours and stays tired a lot.    He does not check his blood pressure at home. He is followed by cardiology. He states his blood pressure is normally 90-100s/80s.    He reports having intermittent pain in his right hip and lower leg for 2 weeks. He denies tenderness to palpation. He has normal ROM in his right lower extremity. He states he has swelling in left hip and left lower extremity after working all day. He states the pain is dull and sometimes stabbing. When the pain flares, he states that he is unable to walk and the pain is 8-10/10. He has taken off 2 days of work related to pain. He takes Tylenol with relief.      The patient has no other complaints today and denies chest pain, shortness of breath, weakness, numbness, nausea, vomiting, diarrhea, dizziness or syncopal event.      Objective   Vital Signs:  /70   Pulse 74   Temp 97.1 °F (36.2 °C)   Resp 18   Ht 180.3 cm (70.98\")   Wt 104 kg (228 lb 9.6 oz)   SpO2 96%   BMI 31.90 kg/m²   Estimated body mass index is 31.9 kg/m² as calculated from the following:    Height as of this encounter: 180.3 cm (70.98\").    Weight as of this " encounter: 104 kg (228 lb 9.6 oz).          Physical Exam  Vitals reviewed.   Constitutional:       Appearance: Normal appearance. He is well-developed.   HENT:      Head: Normocephalic and atraumatic.      Right Ear: External ear normal.      Left Ear: External ear normal.      Mouth/Throat:      Pharynx: No oropharyngeal exudate.   Eyes:      Conjunctiva/sclera: Conjunctivae normal.      Pupils: Pupils are equal, round, and reactive to light.   Neck:      Vascular: No carotid bruit.   Cardiovascular:      Rate and Rhythm: Normal rate and regular rhythm.      Heart sounds: No murmur heard.    No friction rub. No gallop.   Pulmonary:      Effort: Pulmonary effort is normal.      Breath sounds: Normal breath sounds. No wheezing or rhonchi.   Abdominal:      General: There is no distension.   Skin:     General: Skin is warm and dry.   Neurological:      Mental Status: He is alert and oriented to person, place, and time.      Cranial Nerves: No cranial nerve deficit.      Motor: No weakness.   Psychiatric:         Mood and Affect: Mood and affect normal.         Behavior: Behavior normal.         Thought Content: Thought content normal.         Judgment: Judgment normal.        Result Review :    CMP    CMP 12/10/21 8/9/22   Glucose 86 78   BUN 13 13   Creatinine 0.85 0.70 (A)   eGFR Non African Am 91    Sodium 135 (A) 135 (A)   Potassium 5.0 4.4   Chloride 96 (A) 95 (A)   Calcium 9.1 9.3   Albumin 4.10 3.90   Total Bilirubin 0.3 0.3   Alkaline Phosphatase 97 101   AST (SGOT) 30 29   ALT (SGPT) 32 31   (A) Abnormal value            CBC    CBC 12/10/21 8/9/22   WBC 6.23 5.99   RBC 5.19 4.89   Hemoglobin 14.2 13.6   Hematocrit 43.2 41.3   MCV 83.2 84.5   MCH 27.4 27.8   MCHC 32.9 32.9   RDW 13.4 13.0   Platelets 264 228           Lipid Panel    Lipid Panel 8/9/22   Total Cholesterol 141   Triglycerides 70   HDL Cholesterol 36 (A)   VLDL Cholesterol 14   LDL Cholesterol  91   LDL/HDL Ratio 2.53   (A) Abnormal value             TSH    TSH 12/10/21 5/6/22 8/9/22   TSH 12.100 (A) 8.670 (A) 15.500 (A)   (A) Abnormal value                      Assessment and Plan   Diagnoses and all orders for this visit:    1. Primary osteoarthritis involving multiple joints (Primary)  Comments:  increasing pain in right hip and lower leg for 2 weeks    2. Primary hypertension  Comments:  improving with treatment  Assessment & Plan:  Hypertension is improving with treatment.  Continue current treatment regimen.  Dietary sodium restriction.  Weight loss.  Blood pressure will be reassessed at the next regular appointment.      3. Acquired hypothyroidism  Comments:  stable with treatment    4. Class 1 obesity due to excess calories with serious comorbidity and body mass index (BMI) of 31.0 to 31.9 in adult  Assessment & Plan:  Patient's (Body mass index is 31.9 kg/m².) indicates that they are obese (BMI >30) with health conditions that include hypertension and dyslipidemias . Weight is improving with lifestyle modifications. BMI is is above average; BMI management plan is completed. We discussed low calorie, low carb based diet program, portion control and increasing exercise.       5. Cigarette nicotine dependence with nicotine-induced disorder  Assessment & Plan:  Tobacco use is unchanged.  Smoking cessation counseling was provided.  Tobacco use will be reassessed at the next regular appointment.             Follow Up   No follow-ups on file.  Patient was given instructions and counseling regarding his condition or for health maintenance advice. Please see specific information pulled into the AVS if appropriate.

## 2022-10-24 ENCOUNTER — APPOINTMENT (OUTPATIENT)
Dept: CT IMAGING | Facility: HOSPITAL | Age: 62
End: 2022-10-24

## 2022-10-24 ENCOUNTER — HOSPITAL ENCOUNTER (EMERGENCY)
Facility: HOSPITAL | Age: 62
Discharge: HOME | End: 2022-10-24
Attending: EMERGENCY MEDICINE | Admitting: EMERGENCY MEDICINE

## 2022-10-24 VITALS
OXYGEN SATURATION: 97 % | HEART RATE: 65 BPM | TEMPERATURE: 97.9 F | BODY MASS INDEX: 30.52 KG/M2 | HEIGHT: 71 IN | WEIGHT: 218 LBS | RESPIRATION RATE: 18 BRPM | DIASTOLIC BLOOD PRESSURE: 53 MMHG | SYSTOLIC BLOOD PRESSURE: 96 MMHG

## 2022-10-24 DIAGNOSIS — Z04.1 ENCOUNTER FOR EXAMINATION FOLLOWING MOTOR VEHICLE COLLISION (MVC): ICD-10-CM

## 2022-10-24 DIAGNOSIS — T07.XXXA MULTIPLE ABRASIONS: ICD-10-CM

## 2022-10-24 DIAGNOSIS — S01.81XA FACIAL LACERATION, INITIAL ENCOUNTER: ICD-10-CM

## 2022-10-24 DIAGNOSIS — S22.41XA CLOSED FRACTURE OF MULTIPLE RIBS OF RIGHT SIDE, INITIAL ENCOUNTER: Primary | ICD-10-CM

## 2022-10-24 LAB
ALBUMIN SERPL-MCNC: 4.5 G/DL (ref 3.5–5.2)
ALBUMIN/GLOB SERPL: 1.4 G/DL
ALP SERPL-CCNC: 109 U/L (ref 39–117)
ALT SERPL W P-5'-P-CCNC: 40 U/L (ref 1–41)
ANION GAP SERPL CALCULATED.3IONS-SCNC: 11.4 MMOL/L (ref 5–15)
APTT PPP: 28.2 SECONDS (ref 78–95.9)
AST SERPL-CCNC: 44 U/L (ref 1–40)
BASOPHILS # BLD AUTO: 0.09 10*3/MM3 (ref 0–0.2)
BASOPHILS NFR BLD AUTO: 0.5 % (ref 0–1.5)
BILIRUB SERPL-MCNC: 0.4 MG/DL (ref 0–1.2)
BILIRUB UR QL STRIP: NEGATIVE
BUN SERPL-MCNC: 17 MG/DL (ref 8–23)
BUN/CREAT SERPL: 19.8 (ref 7–25)
CALCIUM SPEC-SCNC: 9.2 MG/DL (ref 8.6–10.5)
CHLORIDE SERPL-SCNC: 95 MMOL/L (ref 98–107)
CLARITY UR: CLEAR
CO2 SERPL-SCNC: 27.6 MMOL/L (ref 22–29)
COLOR UR: YELLOW
CREAT SERPL-MCNC: 0.86 MG/DL (ref 0.76–1.27)
DEPRECATED RDW RBC AUTO: 44.1 FL (ref 37–54)
EGFRCR SERPLBLD CKD-EPI 2021: 96.7 ML/MIN/1.73
EOSINOPHIL # BLD AUTO: 0.14 10*3/MM3 (ref 0–0.4)
EOSINOPHIL NFR BLD AUTO: 0.7 % (ref 0.3–6.2)
ERYTHROCYTE [DISTWIDTH] IN BLOOD BY AUTOMATED COUNT: 14.2 % (ref 12.3–15.4)
GLOBULIN UR ELPH-MCNC: 3.3 GM/DL
GLUCOSE SERPL-MCNC: 112 MG/DL (ref 65–99)
GLUCOSE UR STRIP-MCNC: NEGATIVE MG/DL
HCT VFR BLD AUTO: 43.2 % (ref 37.5–51)
HGB BLD-MCNC: 14.1 G/DL (ref 13–17.7)
HGB UR QL STRIP.AUTO: NEGATIVE
HOLD SPECIMEN: NORMAL
IMM GRANULOCYTES # BLD AUTO: 0.1 10*3/MM3 (ref 0–0.05)
IMM GRANULOCYTES NFR BLD AUTO: 0.5 % (ref 0–0.5)
INR PPP: 1.04 (ref 0.86–1.15)
KETONES UR QL STRIP: ABNORMAL
LEUKOCYTE ESTERASE UR QL STRIP.AUTO: NEGATIVE
LIPASE SERPL-CCNC: 14 U/L (ref 13–60)
LYMPHOCYTES # BLD AUTO: 1.18 10*3/MM3 (ref 0.7–3.1)
LYMPHOCYTES NFR BLD AUTO: 6.3 % (ref 19.6–45.3)
MCH RBC QN AUTO: 28 PG (ref 26.6–33)
MCHC RBC AUTO-ENTMCNC: 32.6 G/DL (ref 31.5–35.7)
MCV RBC AUTO: 85.7 FL (ref 79–97)
MONOCYTES # BLD AUTO: 0.72 10*3/MM3 (ref 0.1–0.9)
MONOCYTES NFR BLD AUTO: 3.8 % (ref 5–12)
NEUTROPHILS NFR BLD AUTO: 16.57 10*3/MM3 (ref 1.7–7)
NEUTROPHILS NFR BLD AUTO: 88.2 % (ref 42.7–76)
NITRITE UR QL STRIP: NEGATIVE
NRBC BLD AUTO-RTO: 0 /100 WBC (ref 0–0.2)
PH UR STRIP.AUTO: 6.5 [PH] (ref 5–8)
PLATELET # BLD AUTO: 255 10*3/MM3 (ref 140–450)
PMV BLD AUTO: 9 FL (ref 6–12)
POTASSIUM SERPL-SCNC: 4.3 MMOL/L (ref 3.5–5.2)
PROT SERPL-MCNC: 7.8 G/DL (ref 6–8.5)
PROT UR QL STRIP: NEGATIVE
PROTHROMBIN TIME: 13.8 SECONDS (ref 11.8–14.9)
RBC # BLD AUTO: 5.04 10*6/MM3 (ref 4.14–5.8)
SODIUM SERPL-SCNC: 134 MMOL/L (ref 136–145)
SP GR UR STRIP: >1.03 (ref 1–1.03)
UROBILINOGEN UR QL STRIP: ABNORMAL
WBC NRBC COR # BLD: 18.8 10*3/MM3 (ref 3.4–10.8)

## 2022-10-24 PROCEDURE — 70486 CT MAXILLOFACIAL W/O DYE: CPT

## 2022-10-24 PROCEDURE — 85025 COMPLETE CBC W/AUTO DIFF WBC: CPT | Performed by: EMERGENCY MEDICINE

## 2022-10-24 PROCEDURE — 96374 THER/PROPH/DIAG INJ IV PUSH: CPT

## 2022-10-24 PROCEDURE — 0 IOPAMIDOL PER 1 ML: Performed by: EMERGENCY MEDICINE

## 2022-10-24 PROCEDURE — 70450 CT HEAD/BRAIN W/O DYE: CPT

## 2022-10-24 PROCEDURE — 99284 EMERGENCY DEPT VISIT MOD MDM: CPT

## 2022-10-24 PROCEDURE — 72125 CT NECK SPINE W/O DYE: CPT

## 2022-10-24 PROCEDURE — 36415 COLL VENOUS BLD VENIPUNCTURE: CPT

## 2022-10-24 PROCEDURE — 96376 TX/PRO/DX INJ SAME DRUG ADON: CPT

## 2022-10-24 PROCEDURE — 85730 THROMBOPLASTIN TIME PARTIAL: CPT | Performed by: EMERGENCY MEDICINE

## 2022-10-24 PROCEDURE — 96375 TX/PRO/DX INJ NEW DRUG ADDON: CPT

## 2022-10-24 PROCEDURE — 80053 COMPREHEN METABOLIC PANEL: CPT | Performed by: EMERGENCY MEDICINE

## 2022-10-24 PROCEDURE — 90471 IMMUNIZATION ADMIN: CPT | Performed by: EMERGENCY MEDICINE

## 2022-10-24 PROCEDURE — 25010000002 HYDROMORPHONE 1 MG/ML SOLUTION: Performed by: EMERGENCY MEDICINE

## 2022-10-24 PROCEDURE — 85610 PROTHROMBIN TIME: CPT | Performed by: EMERGENCY MEDICINE

## 2022-10-24 PROCEDURE — 83690 ASSAY OF LIPASE: CPT | Performed by: EMERGENCY MEDICINE

## 2022-10-24 PROCEDURE — 90715 TDAP VACCINE 7 YRS/> IM: CPT | Performed by: EMERGENCY MEDICINE

## 2022-10-24 PROCEDURE — 25010000002 TETANUS-DIPHTH-ACELL PERTUSSIS 5-2.5-18.5 LF-MCG/0.5 SUSPENSION PREFILLED SYRINGE: Performed by: EMERGENCY MEDICINE

## 2022-10-24 PROCEDURE — 81003 URINALYSIS AUTO W/O SCOPE: CPT | Performed by: EMERGENCY MEDICINE

## 2022-10-24 PROCEDURE — 0 LIDOCAINE 1 % SOLUTION: Performed by: EMERGENCY MEDICINE

## 2022-10-24 PROCEDURE — 71260 CT THORAX DX C+: CPT

## 2022-10-24 PROCEDURE — 25010000002 KETOROLAC TROMETHAMINE PER 15 MG: Performed by: EMERGENCY MEDICINE

## 2022-10-24 PROCEDURE — 74177 CT ABD & PELVIS W/CONTRAST: CPT

## 2022-10-24 RX ORDER — LIDOCAINE HYDROCHLORIDE 10 MG/ML
10 INJECTION, SOLUTION EPIDURAL; INFILTRATION; INTRACAUDAL; PERINEURAL ONCE
Status: DISCONTINUED | OUTPATIENT
Start: 2022-10-24 | End: 2022-10-24

## 2022-10-24 RX ORDER — CEPHALEXIN 500 MG/1
500 CAPSULE ORAL 4 TIMES DAILY
Qty: 28 CAPSULE | Refills: 0 | Status: SHIPPED | OUTPATIENT
Start: 2022-10-24 | End: 2022-12-12

## 2022-10-24 RX ORDER — HYDROCODONE BITARTRATE AND ACETAMINOPHEN 7.5; 325 MG/1; MG/1
1 TABLET ORAL EVERY 6 HOURS PRN
Qty: 20 TABLET | Refills: 0 | Status: SHIPPED | OUTPATIENT
Start: 2022-10-24 | End: 2022-10-31 | Stop reason: SDUPTHER

## 2022-10-24 RX ORDER — LIDOCAINE HYDROCHLORIDE 10 MG/ML
10 INJECTION, SOLUTION INFILTRATION; PERINEURAL ONCE
Status: COMPLETED | OUTPATIENT
Start: 2022-10-24 | End: 2022-10-24

## 2022-10-24 RX ORDER — KETOROLAC TROMETHAMINE 30 MG/ML
30 INJECTION, SOLUTION INTRAMUSCULAR; INTRAVENOUS ONCE
Status: COMPLETED | OUTPATIENT
Start: 2022-10-24 | End: 2022-10-24

## 2022-10-24 RX ADMIN — HYDROMORPHONE HYDROCHLORIDE 0.5 MG: 1 INJECTION, SOLUTION INTRAMUSCULAR; INTRAVENOUS; SUBCUTANEOUS at 21:18

## 2022-10-24 RX ADMIN — LIDOCAINE HYDROCHLORIDE 10 ML: 10 INJECTION, SOLUTION INFILTRATION; PERINEURAL at 21:25

## 2022-10-24 RX ADMIN — IOPAMIDOL 100 ML: 755 INJECTION, SOLUTION INTRAVENOUS at 19:00

## 2022-10-24 RX ADMIN — HYDROMORPHONE HYDROCHLORIDE 1 MG: 1 INJECTION, SOLUTION INTRAMUSCULAR; INTRAVENOUS; SUBCUTANEOUS at 18:34

## 2022-10-24 RX ADMIN — KETOROLAC TROMETHAMINE 30 MG: 30 INJECTION, SOLUTION INTRAMUSCULAR; INTRAVENOUS at 21:20

## 2022-10-24 RX ADMIN — TETANUS TOXOID, REDUCED DIPHTHERIA TOXOID AND ACELLULAR PERTUSSIS VACCINE, ADSORBED 0.5 ML: 5; 2.5; 8; 8; 2.5 SUSPENSION INTRAMUSCULAR at 18:08
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
## 2022-10-25 ENCOUNTER — TELEPHONE (OUTPATIENT)
Dept: FAMILY MEDICINE CLINIC | Facility: CLINIC | Age: 62
End: 2022-10-25
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
## 2022-10-31 ENCOUNTER — OFFICE VISIT (OUTPATIENT)
Dept: FAMILY MEDICINE CLINIC | Facility: CLINIC | Age: 62
End: 2022-10-31

## 2022-10-31 VITALS
SYSTOLIC BLOOD PRESSURE: 129 MMHG | HEART RATE: 67 BPM | DIASTOLIC BLOOD PRESSURE: 72 MMHG | OXYGEN SATURATION: 99 % | HEIGHT: 71 IN | WEIGHT: 225 LBS | BODY MASS INDEX: 31.5 KG/M2

## 2022-10-31 DIAGNOSIS — S01.81XD FACIAL LACERATION, SUBSEQUENT ENCOUNTER: Primary | ICD-10-CM

## 2022-10-31 DIAGNOSIS — S22.41XA CLOSED FRACTURE OF MULTIPLE RIBS OF RIGHT SIDE, INITIAL ENCOUNTER: ICD-10-CM

## 2022-10-31 PROCEDURE — 99213 OFFICE O/P EST LOW 20 MIN: CPT | Performed by: FAMILY MEDICINE

## 2022-10-31 RX ORDER — HYDROCODONE BITARTRATE AND ACETAMINOPHEN 7.5; 325 MG/1; MG/1
1 TABLET ORAL EVERY 6 HOURS PRN
Qty: 20 TABLET | Refills: 0 | Status: SHIPPED | OUTPATIENT
Start: 2022-10-31 | End: 2022-12-12

## 2022-11-07 ENCOUNTER — OFFICE VISIT (OUTPATIENT)
Dept: FAMILY MEDICINE CLINIC | Facility: CLINIC | Age: 62
End: 2022-11-07

## 2022-11-07 VITALS
HEIGHT: 71 IN | RESPIRATION RATE: 16 BRPM | BODY MASS INDEX: 31.53 KG/M2 | SYSTOLIC BLOOD PRESSURE: 116 MMHG | TEMPERATURE: 97.6 F | WEIGHT: 225.2 LBS | OXYGEN SATURATION: 97 % | DIASTOLIC BLOOD PRESSURE: 67 MMHG | HEART RATE: 61 BPM

## 2022-11-07 DIAGNOSIS — I10 PRIMARY HYPERTENSION: Chronic | ICD-10-CM

## 2022-11-07 DIAGNOSIS — S22.41XD CLOSED FRACTURE OF MULTIPLE RIBS OF RIGHT SIDE WITH ROUTINE HEALING: Primary | ICD-10-CM

## 2022-11-07 DIAGNOSIS — F17.219 CIGARETTE NICOTINE DEPENDENCE WITH NICOTINE-INDUCED DISORDER: Chronic | ICD-10-CM

## 2022-11-07 DIAGNOSIS — E66.09 CLASS 1 OBESITY DUE TO EXCESS CALORIES WITH SERIOUS COMORBIDITY AND BODY MASS INDEX (BMI) OF 31.0 TO 31.9 IN ADULT: Chronic | ICD-10-CM

## 2022-11-07 PROBLEM — Z00.00 ANNUAL PHYSICAL EXAM: Status: RESOLVED | Noted: 2022-06-10 | Resolved: 2022-11-07

## 2022-11-07 PROCEDURE — 99214 OFFICE O/P EST MOD 30 MIN: CPT | Performed by: FAMILY MEDICINE

## 2022-11-07 NOTE — ASSESSMENT & PLAN NOTE
Patient's (Body mass index is 31.42 kg/m².) indicates that they are obese (BMI >30) with health conditions that include hypertension and dyslipidemias . Weight is unchanged. BMI is is above average; BMI management plan is completed. We discussed low calorie, low carb based diet program, portion control and increasing exercise.

## 2022-11-07 NOTE — ASSESSMENT & PLAN NOTE
The patient's rib fractures appear to be healing as expected.  We will continue to monitor he was told call back if they do not continue to improve.
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
## 2022-11-08 RX ORDER — FERROUS SULFATE 325(65) MG
TABLET ORAL
Qty: 90 TABLET | Refills: 0 | Status: SHIPPED | OUTPATIENT
Start: 2022-11-08 | End: 2023-02-13

## 2022-11-08 NOTE — TELEPHONE ENCOUNTER
Caller: DIONI HAWKINS    Relationship: Emergency Contact    Best call back number: [phone]    Requested Prescriptions:   Requested Prescriptions     Pending Prescriptions Disp Refills   • FeroSul 325 (65 Fe) MG tablet [Pharmacy Med Name: FERROUS SULFATE 325MG (5GR) TABS] 90 tablet 0     Sig: TAKE 1 TABLET BY MOUTH EVERY DAY        Pharmacy where request should be sent: [id number] [address] AT [facility] - [phone] [facility] [phone] FX       Does the patient have less than a 3 day supply:  [] Yes  [x] No    Florentin Younger Rep   11/08/22 08:19 EST

## 2022-12-12 ENCOUNTER — OFFICE VISIT (OUTPATIENT)
Dept: FAMILY MEDICINE CLINIC | Facility: CLINIC | Age: 62
End: 2022-12-12

## 2022-12-12 VITALS
WEIGHT: 227.2 LBS | OXYGEN SATURATION: 96 % | HEIGHT: 71 IN | SYSTOLIC BLOOD PRESSURE: 132 MMHG | DIASTOLIC BLOOD PRESSURE: 63 MMHG | BODY MASS INDEX: 31.81 KG/M2 | TEMPERATURE: 96.3 F | HEART RATE: 67 BPM

## 2022-12-12 DIAGNOSIS — E03.9 ACQUIRED HYPOTHYROIDISM: Primary | Chronic | ICD-10-CM

## 2022-12-12 DIAGNOSIS — F17.219 CIGARETTE NICOTINE DEPENDENCE WITH NICOTINE-INDUCED DISORDER: Chronic | ICD-10-CM

## 2022-12-12 DIAGNOSIS — I10 PRIMARY HYPERTENSION: Chronic | ICD-10-CM

## 2022-12-12 DIAGNOSIS — E66.09 CLASS 1 OBESITY DUE TO EXCESS CALORIES WITH SERIOUS COMORBIDITY AND BODY MASS INDEX (BMI) OF 31.0 TO 31.9 IN ADULT: Chronic | ICD-10-CM

## 2022-12-12 PROBLEM — S22.41XD CLOSED FRACTURE OF MULTIPLE RIBS OF RIGHT SIDE WITH ROUTINE HEALING: Status: RESOLVED | Noted: 2022-11-07 | Resolved: 2022-12-12

## 2022-12-12 PROCEDURE — 99214 OFFICE O/P EST MOD 30 MIN: CPT | Performed by: FAMILY MEDICINE

## 2022-12-12 RX ORDER — LEVOTHYROXINE SODIUM 137 UG/1
TABLET ORAL
Qty: 180 TABLET | Refills: 1 | Status: SHIPPED | OUTPATIENT
Start: 2022-12-12 | End: 2023-03-03 | Stop reason: SDUPTHER
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
## 2022-12-12 NOTE — ASSESSMENT & PLAN NOTE
Patient's (Body mass index is 31.71 kg/m².) indicates that they are obese (BMI >30) with health conditions that include hypertension and dyslipidemias . Weight is worsening. BMI is is above average; BMI management plan is completed. We discussed low calorie, low carb based diet program, portion control and increasing exercise.

## 2023-01-24 ENCOUNTER — TELEPHONE (OUTPATIENT)
Dept: FAMILY MEDICINE CLINIC | Facility: CLINIC | Age: 63
End: 2023-01-24
Payer: COMMERCIAL

## 2023-01-24 NOTE — TELEPHONE ENCOUNTER
Caller: DIONI HAWKINS    Relationship: Emergency Contact    Best call back number: [phone]    What is the best time to reach you: ANY    Who are you requesting to speak with (clinical staff, provider,  specific staff member): CLINICAL    Do you know the name of the person who called: SPOUSE    What was the call regarding: CALLED STATING PATIENT WAS RECENTLY GIVEN A NEW PRESCRIPTION FOR levothyroxine (SYNTHROID, LEVOTHROID) 137 MCG tablet IT INCREASED FROM 125MCG. PATIENT'S WIFE WOULD LIKE TO KNOW IF THEY ARE TO TOSS THE OLD DOSAGE OF MEDICATION OR  IF THE PATIENT CAN FINISH OR MODIFY WHAT HE TAKES OF THE OLD PRESCRIPTION BEFORE HE STARTS THE NEW PRESCRIPTION DUE TO HAVING SO MUCH LEFT OF THE OLD PRESCRIPTION.     Do you require a callback: YES

## 2023-01-25 NOTE — TELEPHONE ENCOUNTER
Spoke with patient, let her know to give him the 137mcg. She can hang on to these incase patient needs to decrease back down the in future. She understood.

## 2023-01-30 RX ORDER — ACETAMINOPHEN 160 MG
TABLET,DISINTEGRATING ORAL
Qty: 90 CAPSULE | Refills: 0 | Status: SHIPPED | OUTPATIENT
Start: 2023-01-30

## 2023-02-13 RX ORDER — FERROUS SULFATE 325(65) MG
TABLET ORAL
Qty: 90 TABLET | Refills: 0 | Status: SHIPPED | OUTPATIENT
Start: 2023-02-13

## 2023-03-03 RX ORDER — LEVOTHYROXINE SODIUM 137 UG/1
TABLET ORAL
Qty: 180 TABLET | Refills: 1 | Status: SHIPPED | OUTPATIENT
Start: 2023-03-03 | End: 2023-03-31 | Stop reason: SDUPTHER

## 2023-03-03 NOTE — TELEPHONE ENCOUNTER
Caller: DIONI HAWKINS    Relationship: Emergency Contact    Best call back number: [phone]    Requested Prescriptions:   Requested Prescriptions     Pending Prescriptions Disp Refills   • levothyroxine (SYNTHROID, LEVOTHROID) 137 MCG tablet 180 tablet 1     Sig: Take 2 tablets in the am 30 minutes before breakfast.        Pharmacy where request should be sent: [facility] - [place] [address] [address] [facility] - [phone] [facility] [phone] FX     Does the patient have less than a 3 day supply:  [x] Yes  [] No    Would you like a call back once the refill request has been completed: [] Yes [x] No    If the office needs to give you a call back, can they leave a voicemail: [] Yes [x] No    Florentin Longo Rep   03/03/23 12:30 EST

## 2023-03-31 RX ORDER — LEVOTHYROXINE SODIUM 137 UG/1
TABLET ORAL
Qty: 180 TABLET | Refills: 1 | Status: SHIPPED | OUTPATIENT
Start: 2023-03-31

## 2023-03-31 RX ORDER — LIOTHYRONINE SODIUM 5 UG/1
TABLET ORAL
Qty: 180 TABLET | Refills: 1 | Status: SHIPPED | OUTPATIENT
Start: 2023-03-31

## 2023-03-31 RX ORDER — LEVOTHYROXINE SODIUM 137 UG/1
137 TABLET ORAL DAILY
OUTPATIENT
Start: 2023-03-31

## 2023-04-17 RX ORDER — LEVOTHYROXINE SODIUM 125 MCG
TABLET ORAL
Qty: 180 TABLET | Refills: 3 | Status: SHIPPED | OUTPATIENT
Start: 2023-04-17

## 2023-05-17 NOTE — PROGRESS NOTES
[facility]  Cardiology progress Note    Patient Name: Colton Chiang  : 1960    CHIEF COMPLAINT  CHF        Subjective   Subjective     HISTORY OF PRESENT ILLNESS    Colton Chiang is a 63 y.o. male with history of CHF.  No chest pain or shortness of breath    REVIEW OF SYSTEMS    Constitutional:    No fever, no weight loss  Skin:     No rash  Otolaryngeal:    No difficulty swallowing  Cardiovascular: See HPI.  Pulmonary:    No cough, no sputum production    Personal History     Social History:    reports that he has been smoking cigarettes. He started smoking about 51 years ago. He has a 75.00 pack-year smoking history. He has never used smokeless tobacco. He reports that he does not drink alcohol and does not use drugs.    Home Medications:  Current Outpatient Medications on File Prior to Visit   Medication Sig   • Ascorbic Acid (Vitamin C) 500 MG capsule Take  by mouth.   • Aspirin Low Dose 81 MG EC tablet Take 1 tablet by mouth Daily.   • atorvastatin (LIPITOR) 10 MG tablet Take 1 tablet by mouth Daily.   • Cholecalciferol (Vitamin D3) 50 MCG (2000 UT) capsule TAKE 1 CAPSULE BY MOUTH EVERY DAY   • FeroSul 325 (65 Fe) MG tablet TAKE 1 TABLET BY MOUTH EVERY DAY   • furosemide (LASIX) 40 MG tablet Take a half a tab 1 day and a whole tab the next day.  Can take an extra half on a as needed basis   • levothyroxine (SYNTHROID, LEVOTHROID) 137 MCG tablet Take 2 tablets in the am 30 minutes before breakfast.   • liothyronine (CYTOMEL) 5 MCG tablet TAKE 2 TABLETS DAILY   • Magnesium 250 MG tablet Take  by mouth.   • metoprolol succinate XL (TOPROL-XL) 25 MG 24 hr tablet Take 1/2 tab po qd   • multivitamin with minerals tablet tablet Take 1 tablet by mouth Daily.   • Zinc 50 MG capsule Take  by mouth.   • [DISCONTINUED] Loratadine 10 MG capsule Take 10 mg by mouth. (Patient not taking: Reported on 2023)   • [DISCONTINUED] Synthroid 125 MCG tablet TAKE 1 TABLET TWICE A DAY (Patient not taking:  Reported on 5/19/2023)     No current facility-administered medications on file prior to visit.       Past Medical History:   Diagnosis Date   • Allergies    • Arthritis    • Blood disease    • Broken bones     Cracked   • CHF (congestive heart failure) 1990   • Chronic back pain    • Deafness, bilateral    • Depression    • HBP (high blood pressure)    • Heart disease    • High cholesterol    • History of radiation therapy    • Hypothyroid 02/10/2017   • Prostate cancer 02/05/2016   • 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, bilateral    • Stroke    • Thyroid disorder    • 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        Allergies:  Allergies   Allergen Reactions   • Penicillins Irritability       Objective    Objective       Vitals:   Heart Rate:  [63] 63  BP: (145)/(79) 145/79  Body mass index is 32.11 kg/m².     PHYSICAL EXAM:    General Appearance:   · well developed  · well nourished  HENT:   · oropharynx moist  · lips not cyanotic  Neck:  · thyroid not enlarged  · supple  Respiratory:  · no respiratory distress  · normal breath sounds  · no rales  Cardiovascular:  · no jugular venous distention  · regular rhythm  · apical impulse normal  · S1 normal, S2 normal  · no S3, no S4   · no murmur  · no rub, no thrill  · carotid pulses normal; no bruit  · pedal pulses normal  · lower extremity edema: none    Skin:   · warm, dry  Psychiatric:  · judgement and insight appropriate  · normal mood and affect        Result Review:  I have personally reviewed the available results from  [x]  Laboratory  [x]  EKG  [x]  Cardiology  [x]  Medications  [x]  Old records  []  Other:     Procedures  Lab Results   Component Value Date    CHOL 141 08/09/2022    CHOL 131 08/14/2021     Lab Results   Component Value Date    TRIG 70 08/09/2022    TRIG 45 08/14/2021    TRIG 54 08/10/2020     Lab Results   Component Value Date    HDL 36 (L) 08/09/2022    HDL 40 08/14/2021    HDL 37 (L) 08/10/2020     Lab Results   Component Value Date    LDL 91 08/09/2022    LDL 81  08/14/2021    LDL 99 08/10/2020     Lab Results   Component Value Date    VLDL 14 08/09/2022    VLDL 10 08/14/2021    VLDL 11 08/10/2020     Results for orders placed in visit on 12/29/22    Adult Transthoracic Echo Complete W/ Cont if Necessary Per Protocol    Interpretation Summary  Normal left ventricular systolic function.  No significant valve abnormalities noted.     Impression/Plan:  1.  Chronic diastolic heart failure stable: Continue Toprol-XL 25 mg once a day.  Continue Lasix 40 mg once a day.  2.  Hyperlipidemia: Continue Lipitor 10 mg once a day.  Monitor lipid and hepatic profile           Colin Concepcion MD   05/19/23   08:48 EDT

## 2023-05-19 ENCOUNTER — OFFICE VISIT (OUTPATIENT)
Dept: CARDIOLOGY | Facility: CLINIC | Age: 63
End: 2023-05-19
Payer: MEDICARE

## 2023-05-19 VITALS
BODY MASS INDEX: 32.11 KG/M2 | DIASTOLIC BLOOD PRESSURE: 79 MMHG | HEART RATE: 63 BPM | SYSTOLIC BLOOD PRESSURE: 145 MMHG | WEIGHT: 223.8 LBS

## 2023-05-19 DIAGNOSIS — E78.2 HYPERLIPEMIA, MIXED: ICD-10-CM

## 2023-05-19 DIAGNOSIS — I50.32 DIASTOLIC CHF, CHRONIC: Primary | ICD-10-CM

## 2023-05-19 PROCEDURE — 1159F MED LIST DOCD IN RCRD: CPT | Performed by: SPECIALIST

## 2023-05-19 PROCEDURE — 3078F DIAST BP <80 MM HG: CPT | Performed by: SPECIALIST

## 2023-05-19 PROCEDURE — 99213 OFFICE O/P EST LOW 20 MIN: CPT | Performed by: SPECIALIST

## 2023-05-19 PROCEDURE — 1160F RVW MEDS BY RX/DR IN RCRD: CPT | Performed by: SPECIALIST

## 2023-05-19 PROCEDURE — 3077F SYST BP >= 140 MM HG: CPT | Performed by: SPECIALIST

## 2023-06-05 ENCOUNTER — OFFICE VISIT (OUTPATIENT)
Dept: FAMILY MEDICINE CLINIC | Facility: CLINIC | Age: 63
End: 2023-06-05
Payer: MEDICARE

## 2023-06-05 VITALS
HEIGHT: 70 IN | DIASTOLIC BLOOD PRESSURE: 72 MMHG | WEIGHT: 230.4 LBS | SYSTOLIC BLOOD PRESSURE: 132 MMHG | HEART RATE: 62 BPM | OXYGEN SATURATION: 94 % | RESPIRATION RATE: 18 BRPM | BODY MASS INDEX: 32.99 KG/M2 | TEMPERATURE: 97.7 F

## 2023-06-05 DIAGNOSIS — E78.5 HYPERLIPIDEMIA LDL GOAL <100: ICD-10-CM

## 2023-06-05 DIAGNOSIS — Z00.00 WELCOME TO MEDICARE PREVENTIVE VISIT: Primary | ICD-10-CM

## 2023-06-05 DIAGNOSIS — D50.9 IRON DEFICIENCY ANEMIA, UNSPECIFIED IRON DEFICIENCY ANEMIA TYPE: ICD-10-CM

## 2023-06-05 DIAGNOSIS — E66.09 CLASS 1 OBESITY DUE TO EXCESS CALORIES WITH SERIOUS COMORBIDITY AND BODY MASS INDEX (BMI) OF 33.0 TO 33.9 IN ADULT: Chronic | ICD-10-CM

## 2023-06-05 DIAGNOSIS — I10 PRIMARY HYPERTENSION: Chronic | ICD-10-CM

## 2023-06-05 DIAGNOSIS — Z85.46 HISTORY OF PROSTATE CANCER: Chronic | ICD-10-CM

## 2023-06-05 DIAGNOSIS — F17.219 CIGARETTE NICOTINE DEPENDENCE WITH NICOTINE-INDUCED DISORDER: Chronic | ICD-10-CM

## 2023-06-05 DIAGNOSIS — E03.9 ACQUIRED HYPOTHYROIDISM: Chronic | ICD-10-CM

## 2023-06-05 PROBLEM — E66.811 CLASS 1 OBESITY DUE TO EXCESS CALORIES WITH SERIOUS COMORBIDITY AND BODY MASS INDEX (BMI) OF 31.0 TO 31.9 IN ADULT: Chronic | Status: RESOLVED | Noted: 2021-06-11 | Resolved: 2023-06-05

## 2023-06-05 NOTE — ASSESSMENT & PLAN NOTE
Patient's (Body mass index is 33.06 kg/m².) indicates that they are obese (BMI >30) with health conditions that include hypertension, coronary heart disease, and dyslipidemias . Weight is worsening. BMI  is above average; BMI management plan is completed. We discussed low calorie, low carb based diet program, portion control, and increasing exercise.

## 2023-06-05 NOTE — PROGRESS NOTES
The ABCs of the Annual Wellness Visit  [place] to Medicare Visit    Subjective     Colton Chiang is a 63 y.o. male who presents for a  Welcome to Medicare Visit.    The following portions of the patient's history were reviewed and   updated as appropriate: allergies, current medications, past family history, past medical history, past social history, past surgical history, and problem list.     Compared to one year ago, the patient feels his physical   health is the same.    Compared to one year ago, the patient feels his mental   health is the same.    Recent Hospitalizations:  He was not admitted to the hospital during the last year.       Current Medical Providers:  Patient Care Team:  Eduarda Butt DO as PCP - General (Family Medicine)    Outpatient Medications Prior to Visit   Medication Sig Dispense Refill    Ascorbic Acid (Vitamin C) 500 MG capsule Take  by mouth.      Aspirin Low Dose 81 MG EC tablet Take 1 tablet by mouth Daily. 90 tablet 2    atorvastatin (LIPITOR) 10 MG tablet Take 1 tablet by mouth Daily. 90 tablet 3    Cholecalciferol (Vitamin D3) 50 MCG (2000 UT) capsule TAKE 1 CAPSULE BY MOUTH EVERY DAY 90 capsule 0    FeroSul 325 (65 Fe) MG tablet TAKE 1 TABLET BY MOUTH EVERY DAY 90 tablet 0    furosemide (LASIX) 40 MG tablet Take a half a tab 1 day and a whole tab the next day.  Can take an extra half on a as needed basis 90 tablet 3    levothyroxine (SYNTHROID, LEVOTHROID) 137 MCG tablet Take 2 tablets in the am 30 minutes before breakfast. 180 tablet 1    liothyronine (CYTOMEL) 5 MCG tablet TAKE 2 TABLETS DAILY 180 tablet 1    Magnesium 250 MG tablet Take  by mouth.      metoprolol succinate XL (TOPROL-XL) 25 MG 24 hr tablet Take 1/2 tab po qd 45 tablet 3    multivitamin with minerals tablet tablet Take 1 tablet by mouth Daily.      Zinc 50 MG capsule Take  by mouth.       No facility-administered medications prior to visit.       No opioid medication identified on active medication list. I have  "reviewed chart for other potential  high risk medication/s and harmful drug interactions in the elderly.        Aspirin is on active medication list. Aspirin use is indicated based on review of current medical condition/s. Pros and cons of this therapy have been discussed today. Benefits of this medication outweigh potential harm.  Patient has been encouraged to continue taking this medication.  .      Patient Active Problem List   Diagnosis    HTN (hypertension)    ED (erectile dysfunction)    History of CVA (cerebrovascular accident)    Class 1 obesity due to excess calories with serious comorbidity and body mass index (BMI) of 33.0 to 33.9 in adult    Primary osteoarthritis involving multiple joints    Hyperlipidemia LDL goal <100    Seasonal allergies    Acquired hypothyroidism    History of prostate cancer    Cigarette nicotine dependence with nicotine-induced disorder    Chronic diastolic (congestive) heart failure    Iron deficiency anemia    Welcome to Medicare preventive visit     Advance Care Planning   Advance Care Planning     Advance Directive is not on file.  ACP discussion was held with the patient during this visit. Patient does not have an advance directive, information provided.       Objective   Vitals:    06/05/23 0743   BP: 132/72   Pulse: 62   Resp: 18   Temp: 97.7 °F (36.5 °C)   SpO2: 94%   Weight: 105 kg (230 lb 6.4 oz)   Height: 177.8 cm (70\")   PainSc:   5     Estimated body mass index is 33.06 kg/m² as calculated from the following:    Height as of this encounter: 177.8 cm (70\").    Weight as of this encounter: 105 kg (230 lb 6.4 oz).    BMI is >= 30 and <35. (Class 1 Obesity). The following options were offered after discussion;: weight loss educational material (shared in after visit summary)      Does the patient have evidence of cognitive impairment?   No         Procedures       HEALTH RISK ASSESSMENT    Smoking Status:  Social History     Tobacco Use   Smoking Status Every Day    " Packs/day: 1.50    Years: 50.00    Pack years: 75.00    Types: Cigarettes    Start date: 1972   Smokeless Tobacco Never     Alcohol Consumption:  Social History     Substance and Sexual Activity   Alcohol Use Never       Fall Risk Screen:    CHELSIE Fall Risk Assessment was completed, and patient is at HIGH risk for falls. Assessment completed on:2023    Depression Screen:       2023     7:39 AM   PHQ-2/PHQ-9 Depression Screening   Little Interest or Pleasure in Doing Things 0-->not at all   Feeling Down, Depressed or Hopeless 0-->not at all   PHQ-9: Brief Depression Severity Measure Score 0       Health Habits and Functional and Cognitive Screenin/5/2023     7:00 AM   Functional & Cognitive Status   Do you have difficulty preparing food and eating? No   Do you have difficulty bathing yourself, getting dressed or grooming yourself? No   Do you have difficulty using the toilet? No   Do you have difficulty moving around from place to place? No   Do you have trouble with steps or getting out of a bed or a chair? Yes   Current Diet Well Balanced Diet   Dental Exam Up to date   Eye Exam Not up to date   Exercise (times per week) 7 times per week   Current Exercises Include Other   Do you need help using the phone?  No   Are you deaf or do you have serious difficulty hearing?  Yes   Do you need help with transportation? No   Do you need help shopping? No   Do you need help preparing meals?  No   Do you need help with housework?  No   Do you need help with laundry? No   Do you need help taking your medications? No   Do you need help managing money? No   Do you ever drive or ride in a car without wearing a seat belt? Yes   Have you felt unusual stress, anger or loneliness in the last month? No   Who do you live with? Spouse   If you need help, do you have trouble finding someone available to you? No   Have you been bothered in the last four weeks by sexual problems? No   Do you have difficulty  concentrating, remembering or making decisions? No       Visual Acuity:    Vision Screening    Right eye Left eye Both eyes   Without correction 20/50 20/40 20/30   With correction          Age-appropriate Screening Schedule:  Refer to the list below for future screening recommendations based on patient's age, sex and/or medical conditions. Orders for these recommended tests are listed in the plan section. The patient has been provided with a written plan.    Health Maintenance   Topic Date Due    COVID-19 Vaccine (1) 06/07/2023 (Originally 1960)    ZOSTER VACCINE (1 of 2) 12/10/2023 (Originally 1/15/2008)    Pneumococcal Vaccine 65+ (1 - PCV) 06/05/2024 (Originally 1/15/1966)    INFLUENZA VACCINE  08/01/2023    LIPID PANEL  08/09/2023    LUNG CANCER SCREENING  10/24/2023    COLORECTAL CANCER SCREENING  01/11/2024    ANNUAL WELLNESS VISIT  06/05/2024    TDAP/TD VACCINES (2 - Td or Tdap) 10/24/2032    HEPATITIS C SCREENING  Completed    AAA SCREEN (ONE-TIME)  Completed        CMS Preventative Services Quick Reference  Risk Factors Identified During Encounter    None Identified  The above risks/problems have been discussed with the patient.  Pertinent information has been shared with the patient in the After Visit Summary.    Follow Up:   Initial Medicare Visit in one year    An After Visit Summary and PPPS were made available to the patient.      Additional E&M Note during same encounter follows:  Patient has multiple medical problems which are significant and separately identifiable that require additional work above and beyond the Medicare Wellness Visit.      Chief Complaint  Welcome To Medicare, Hypothyroidism, and Hypertension    Subjective        HPI  Colton Chiang is also being seen today for Hypothyroidism, HTN, HLD, prostate cancer and obesity.     Review of Systems   HENT:  Negative for trouble swallowing.    Eyes:  Negative for visual disturbance.   Respiratory:  Negative for apnea.    Cardiovascular:  " Negative for chest pain.   Gastrointestinal:  Negative for blood in stool.   Endocrine: Negative for polyphagia.   Genitourinary:  Negative for dysuria.   Skin:  Negative for color change.   Allergic/Immunologic: Negative for immunocompromised state.   Neurological:  Negative for seizures.   Hematological:  Negative for adenopathy.   Psychiatric/Behavioral:  Negative for behavioral problems.      Objective   Vital Signs:  /72   Pulse 62   Temp 97.7 °F (36.5 °C)   Resp 18   Ht 177.8 cm (70\")   Wt 105 kg (230 lb 6.4 oz)   SpO2 94%   BMI 33.06 kg/m²     Physical Exam  Vitals reviewed.   Constitutional:       Appearance: Normal appearance. He is well-developed. He is obese.   HENT:      Head: Normocephalic and atraumatic.      Right Ear: External ear normal.      Left Ear: External ear normal.      Mouth/Throat:      Pharynx: No oropharyngeal exudate.   Eyes:      Conjunctiva/sclera: Conjunctivae normal.      Pupils: Pupils are equal, round, and reactive to light.   Neck:      Vascular: No carotid bruit.   Cardiovascular:      Rate and Rhythm: Normal rate and regular rhythm.      Heart sounds: No murmur heard.    No friction rub. No gallop.   Pulmonary:      Effort: Pulmonary effort is normal.      Breath sounds: Normal breath sounds. No wheezing or rhonchi.   Abdominal:      General: There is no distension.   Skin:     General: Skin is warm and dry.   Neurological:      Mental Status: He is alert and oriented to person, place, and time.      Cranial Nerves: No cranial nerve deficit.      Motor: No weakness.   Psychiatric:         Mood and Affect: Mood and affect normal.         Behavior: Behavior normal.         Thought Content: Thought content normal.         Judgment: Judgment normal.          CMP          8/9/2022    09:23 10/24/2022    17:58   CMP   Glucose 78  112    BUN 13  17    Creatinine 0.70  0.86    EGFR 102.9  96.7    Sodium 135  134    Potassium 4.4  4.3    Chloride 95  95    Calcium 9.3  9.2 "    Total Protein 6.9  7.8    Albumin 3.90  4.50    Globulin 3.0  3.3    Total Bilirubin 0.3  0.4    Alkaline Phosphatase 101  109    AST (SGOT) 29  44    ALT (SGPT) 31  40    Albumin/Globulin Ratio 1.3  1.4    BUN/Creatinine Ratio 18.6  19.8    Anion Gap 12.2  11.4      CBC          8/9/2022    09:23 10/24/2022    17:58   CBC   WBC 5.99  18.80    RBC 4.89  5.04    Hemoglobin 13.6  14.1    Hematocrit 41.3  43.2    MCV 84.5  85.7    MCH 27.8  28.0    MCHC 32.9  32.6    RDW 13.0  14.2    Platelets 228  255      Lipid Panel          8/9/2022    09:23   Lipid Panel   Total Cholesterol 141    Triglycerides 70    HDL Cholesterol 36    VLDL Cholesterol 14    LDL Cholesterol  91    LDL/HDL Ratio 2.53      TSH          8/9/2022    09:23   TSH   TSH 15.500              Assessment and Plan   Diagnoses and all orders for this visit:    1. Welcome to Medicare preventive visit (Primary)  -     Comprehensive Metabolic Panel; Future  -     CBC & Differential; Future    2. Cigarette nicotine dependence with nicotine-induced disorder  Assessment & Plan:  Tobacco use is unchanged.  Smoking cessation counseling was provided.  Tobacco use will be reassessed at the next regular appointment.      3. Acquired hypothyroidism  -     TSH+Free T4; Future    4. Class 1 obesity due to excess calories with serious comorbidity and body mass index (BMI) of 33.0 to 33.9 in adult  Assessment & Plan:  Patient's (Body mass index is 33.06 kg/m².) indicates that they are obese (BMI >30) with health conditions that include hypertension, coronary heart disease, and dyslipidemias . Weight is worsening. BMI  is above average; BMI management plan is completed. We discussed low calorie, low carb based diet program, portion control, and increasing exercise.       5. History of prostate cancer  -     PSA DIAGNOSTIC ONLY; Future    6. Hyperlipidemia LDL goal <100  -     Lipid Panel; Future    7. Primary hypertension  Assessment & Plan:  Hypertension is improving  with treatment.  Continue current treatment regimen.  Blood pressure will be reassessed at the next regular appointment.      8. Iron deficiency anemia, unspecified iron deficiency anemia type  -     Iron and TIBC; Future  -     Ferritin; Future  -     Vitamin B12; Future  -     Folate; Future             Follow Up   No follow-ups on file.  Patient was given instructions and counseling regarding his condition or for health maintenance advice. Please see specific information pulled into the AVS if appropriate.

## 2023-06-05 NOTE — PATIENT INSTRUCTIONS
Advance Care Planning and Advance Directives     You make decisions on a daily basis - decisions about where you want to live, your career, your home, your life. Perhaps one of the most important decisions you face is your choice for future medical care. Take time to talk with your family and your healthcare team and start planning today.  Advance Care Planning is a process that can help you:  Understand possible future healthcare decisions in light of your own experiences  Reflect on those decision in light of your goals and values  Discuss your decisions with those closest to you and the healthcare professionals that care for you  Make a plan by creating a document that reflects your wishes    Surrogate Decision Maker  In the event of a medical emergency, which has left you unable to communicate or to make your own decisions, you would need someone to make decisions for you.  It is important to discuss your preferences for medical treatment with this person while you are in good health.     Qualities of a surrogate decision maker:  Willing to take on this role and responsibility  Knows what you want for future medical care  Willing to follow your wishes even if they don't agree with them  Able to make difficult medical decisions under stressful circumstances    Advance Directives  These are legal documents you can create that will guide your healthcare team and decision maker(s) when needed. These documents can be stored in the electronic medical record.    Living Will - a legal document to guide your care if you have a terminal condition or a serious illness and are unable to communicate. States vary by statute in document names/types, but most forms may include one or more of the following:        -  Directions regarding life-prolonging treatments        -  Directions regarding artificially provided nutrition/hydration        -  Choosing a healthcare decision maker        -  Direction regarding organ/tissue  donation    Durable Power of  for Healthcare - this document names an -in-fact to make medical decisions for you, but it may also allow this person to make personal and financial decisions for you. Please seek the advice of an  if you need this type of document.    **Advance Directives are not required and no one may discriminate against you if you do not sign one.    Medical Orders  Many states allow specific forms/orders signed by your physician to record your wishes for medical treatment in your current state of health. This form, signed in personal communication with your physician, addresses resuscitation and other medical interventions that you may or may not want.      For more information or to schedule a time with a [facility] Advance Care Planning Facilitator contact: [email or web address] or call [phone] and someone will contact you directly.

## 2023-07-03 ENCOUNTER — TELEPHONE (OUTPATIENT)
Dept: FAMILY MEDICINE CLINIC | Facility: CLINIC | Age: 63
End: 2023-07-03
Payer: COMMERCIAL

## 2023-07-25 ENCOUNTER — OFFICE VISIT (OUTPATIENT)
Dept: FAMILY MEDICINE CLINIC | Facility: CLINIC | Age: 63
End: 2023-07-25
Payer: MEDICARE

## 2023-07-25 VITALS
TEMPERATURE: 97.8 F | RESPIRATION RATE: 18 BRPM | OXYGEN SATURATION: 93 % | HEART RATE: 73 BPM | WEIGHT: 230.4 LBS | SYSTOLIC BLOOD PRESSURE: 132 MMHG | HEIGHT: 70 IN | BODY MASS INDEX: 32.99 KG/M2 | DIASTOLIC BLOOD PRESSURE: 76 MMHG

## 2023-07-25 DIAGNOSIS — E66.09 CLASS 1 OBESITY DUE TO EXCESS CALORIES WITH SERIOUS COMORBIDITY AND BODY MASS INDEX (BMI) OF 33.0 TO 33.9 IN ADULT: Chronic | ICD-10-CM

## 2023-07-25 DIAGNOSIS — C61 PROSTATE CANCER: ICD-10-CM

## 2023-07-25 DIAGNOSIS — F17.219 CIGARETTE NICOTINE DEPENDENCE WITH NICOTINE-INDUCED DISORDER: Chronic | ICD-10-CM

## 2023-07-25 DIAGNOSIS — M79.89 LEG SWELLING: ICD-10-CM

## 2023-07-25 DIAGNOSIS — M54.16 LUMBAR RADICULOPATHY: ICD-10-CM

## 2023-07-25 DIAGNOSIS — R97.20 RISING PSA LEVEL: Primary | ICD-10-CM

## 2023-07-25 DIAGNOSIS — G25.81 RLS (RESTLESS LEGS SYNDROME): ICD-10-CM

## 2023-07-25 PROBLEM — Z00.00 WELCOME TO MEDICARE PREVENTIVE VISIT: Status: RESOLVED | Noted: 2022-06-10 | Resolved: 2023-07-25

## 2023-07-25 PROCEDURE — 3075F SYST BP GE 130 - 139MM HG: CPT | Performed by: FAMILY MEDICINE

## 2023-07-25 PROCEDURE — 99214 OFFICE O/P EST MOD 30 MIN: CPT | Performed by: FAMILY MEDICINE

## 2023-07-25 PROCEDURE — 3078F DIAST BP <80 MM HG: CPT | Performed by: FAMILY MEDICINE

## 2023-07-25 RX ORDER — ROPINIROLE 0.25 MG/1
TABLET, FILM COATED ORAL
Qty: 60 TABLET | Refills: 2 | Status: SHIPPED | OUTPATIENT
Start: 2023-07-25

## 2023-07-25 NOTE — PROGRESS NOTES
"Chief Complaint  Leg Swelling (Red/Swelling- about a month- having to drag the foot instead of picking it up ), Back Pain, and Hip Pain    Subjective        Colton Chiang presents to [facility]  History of Present Illness  The patient is here today for a follow-up for the management of his chronic medical conditions.  He is  with one daughter.  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 He has a family history of breast cancer.     The patient is still continuing to smoke.     The patient is continue to take 2-5 mcg Cytomel and 2-125 mcg levothyroxine daily.  His most recent TSH on 8/9/22 was found to be 1.08.  He denies feeling any more fatigued than he usually does.  The patient works a lot of hours and stays tired a lot.     He does not check his blood pressure at home. He is followed by cardiology. He states his blood pressure is normally 90-100s/80s.    He is having right lower leg swelling for the past couple weeks. He is also feeling fatigued. He says that he has right leg pain and weakness with foot drop. His pain originates from his upper right hip.       The patient has no other complaints today and denies chest pain, shortness of breath, weakness, numbness, nausea, vomiting, diarrhea, dizziness or syncopal event.         Objective   Vital Signs:  /76 (BP Location: Left arm, Patient Position: Sitting, Cuff Size: Adult)   Pulse 73   Temp 97.8 °F (36.6 °C) (Tympanic)   Resp 18   Ht 177.8 cm (70\")   Wt 105 kg (230 lb 6.4 oz)   SpO2 93%   BMI 33.06 kg/m²   Estimated body mass index is 33.06 kg/m² as calculated from the following:    Height as of this encounter: 177.8 cm (70\").    Weight as of this encounter: 105 kg (230 lb 6.4 oz).               Physical " Exam  Vitals reviewed.   Constitutional:       Appearance: He is well-developed. He is obese.   HENT:      Head: Normocephalic and atraumatic.      Right Ear: External ear normal.      Left Ear: External ear normal.      Mouth/Throat:      Pharynx: No oropharyngeal exudate.   Eyes:      Conjunctiva/sclera: Conjunctivae normal.      Pupils: Pupils are equal, round, and reactive to light.   Neck:      Vascular: No carotid bruit.   Cardiovascular:      Rate and Rhythm: Normal rate and regular rhythm.      Heart sounds: No murmur heard.    No friction rub. No gallop.   Pulmonary:      Effort: Pulmonary effort is normal.      Breath sounds: Normal breath sounds. No wheezing or rhonchi.   Abdominal:      General: There is no distension.   Skin:     General: Skin is warm and dry.   Neurological:      Mental Status: He is alert and oriented to person, place, and time.      Cranial Nerves: No cranial nerve deficit.      Motor: No weakness.   Psychiatric:         Mood and Affect: Mood and affect normal.         Behavior: Behavior normal.         Thought Content: Thought content normal.         Judgment: Judgment normal.      Result Review :    CMP          8/9/2022    09:23 10/24/2022    17:58 6/23/2023    08:06   CMP   Glucose 78  112  92    BUN 13  17  12    Creatinine 0.70  0.86  0.77    EGFR 102.9  96.7  99.4    Sodium 135  134  138    Potassium 4.4  4.3  4.8    Chloride 95  95  96    Calcium 9.3  9.2  9.6    Total Protein 6.9  7.8  7.0    Albumin 3.90  4.50  4.0    Globulin 3.0  3.3  3.0    Total Bilirubin 0.3  0.4  0.2    Alkaline Phosphatase 101  109  100    AST (SGOT) 29  44  30    ALT (SGPT) 31  40  31    Albumin/Globulin Ratio 1.3  1.4  1.3    BUN/Creatinine Ratio 18.6  19.8  15.6    Anion Gap 12.2  11.4  12.4      CBC          8/9/2022    09:23 10/24/2022    17:58 6/23/2023    08:06   CBC   WBC 5.99  18.80  6.27    RBC 4.89  5.04  4.96    Hemoglobin 13.6  14.1  13.4    Hematocrit 41.3  43.2  40.3    MCV 84.5  85.7   81.3    MCH 27.8  28.0  27.0    MCHC 32.9  32.6  33.3    RDW 13.0  14.2  13.3    Platelets 228  255  257      Lipid Panel          8/9/2022    09:23 6/23/2023    08:06   Lipid Panel   Total Cholesterol 141  142    Triglycerides 70  39    HDL Cholesterol 36  42    VLDL Cholesterol 14  9    LDL Cholesterol  91  91    LDL/HDL Ratio 2.53  2.20      TSH          8/9/2022    09:23 6/23/2023    08:06   TSH   TSH 15.500  4.000                   Assessment and Plan   Diagnoses and all orders for this visit:    1. Rising PSA level (Primary)  Comments:  He was given a referral to urology to be managed according to findings.  Orders:  -     Ambulatory Referral to Urology    2. Leg swelling  -     Duplex Venous Lower Extremity - Right CAR; Future    3. Lumbar radiculopathy  Comments:  The patient was given an order today for an MRI of his lumbar spine to be managed according to findings.  Orders:  -     MRI Lumbar Spine Without Contrast; Future    4. Prostate cancer  -     Ambulatory Referral to Urology    5. Class 1 obesity due to excess calories with serious comorbidity and body mass index (BMI) of 33.0 to 33.9 in adult  Assessment & Plan:  Patient's (Body mass index is 33.06 kg/m².) indicates that they are obese (BMI >30) with health conditions that include hypertension and dyslipidemias . Weight is unchanged. BMI  is above average; BMI management plan is completed. We discussed low calorie, low carb based diet program, portion control, and increasing exercise.       6. Cigarette nicotine dependence with nicotine-induced disorder  Assessment & Plan:  Tobacco use is unchanged.  Smoking cessation counseling was provided.  Tobacco use will be reassessed at the next regular appointment.      7. RLS (restless legs syndrome)  -     rOPINIRole (REQUIP) 0.25 MG tablet; Take 1 hour before bedtime.can increase to 2 tablets if needed for RLS  Dispense: 60 tablet; Refill: 2             Follow Up   Return in about 3 weeks (around  8/15/2023).  Patient was given instructions and counseling regarding his condition or for health maintenance advice. Please see specific information pulled into the AVS if appropriate.

## 2023-07-25 NOTE — ASSESSMENT & PLAN NOTE
Patient's (Body mass index is 33.06 kg/m².) indicates that they are obese (BMI >30) with health conditions that include hypertension and dyslipidemias . Weight is unchanged. BMI  is above average; BMI management plan is completed. We discussed low calorie, low carb based diet program, portion control, and increasing exercise.

## 2023-07-28 ENCOUNTER — HOSPITAL ENCOUNTER (OUTPATIENT)
Dept: CARDIOLOGY | Facility: HOSPITAL | Age: 63
Discharge: HOME | End: 2023-07-28
Payer: MEDICARE

## 2023-07-28 DIAGNOSIS — M79.89 LEG SWELLING: ICD-10-CM

## 2023-07-28 LAB
BH CV LOWER VASCULAR LEFT COMMON FEMORAL AUGMENT: NORMAL
BH CV LOWER VASCULAR LEFT COMMON FEMORAL COMPETENT: NORMAL
BH CV LOWER VASCULAR LEFT COMMON FEMORAL COMPRESS: NORMAL
BH CV LOWER VASCULAR LEFT COMMON FEMORAL PHASIC: NORMAL
BH CV LOWER VASCULAR LEFT COMMON FEMORAL SPONT: NORMAL
BH CV LOWER VASCULAR RIGHT COMMON FEMORAL AUGMENT: NORMAL
BH CV LOWER VASCULAR RIGHT COMMON FEMORAL COMPETENT: NORMAL
BH CV LOWER VASCULAR RIGHT COMMON FEMORAL COMPRESS: NORMAL
BH CV LOWER VASCULAR RIGHT COMMON FEMORAL PHASIC: NORMAL
BH CV LOWER VASCULAR RIGHT COMMON FEMORAL SPONT: NORMAL
BH CV LOWER VASCULAR RIGHT DISTAL FEMORAL COMPRESS: NORMAL
BH CV LOWER VASCULAR RIGHT GASTRONEMIUS COMPRESS: NORMAL
BH CV LOWER VASCULAR RIGHT GREATER SAPH AK COMPRESS: NORMAL
BH CV LOWER VASCULAR RIGHT GREATER SAPH BK COMPRESS: NORMAL
BH CV LOWER VASCULAR RIGHT LESSER SAPH COMPRESS: NORMAL
BH CV LOWER VASCULAR RIGHT MID FEMORAL AUGMENT: NORMAL
BH CV LOWER VASCULAR RIGHT MID FEMORAL COMPETENT: NORMAL
BH CV LOWER VASCULAR RIGHT MID FEMORAL COMPRESS: NORMAL
BH CV LOWER VASCULAR RIGHT MID FEMORAL PHASIC: NORMAL
BH CV LOWER VASCULAR RIGHT MID FEMORAL SPONT: NORMAL
BH CV LOWER VASCULAR RIGHT PERONEAL COMPRESS: NORMAL
BH CV LOWER VASCULAR RIGHT POPLITEAL AUGMENT: NORMAL
BH CV LOWER VASCULAR RIGHT POPLITEAL COMPETENT: NORMAL
BH CV LOWER VASCULAR RIGHT POPLITEAL COMPRESS: NORMAL
BH CV LOWER VASCULAR RIGHT POPLITEAL PHASIC: NORMAL
BH CV LOWER VASCULAR RIGHT POPLITEAL SPONT: NORMAL
BH CV LOWER VASCULAR RIGHT POSTERIOR TIBIAL COMPRESS: NORMAL
BH CV LOWER VASCULAR RIGHT PROXIMAL FEMORAL COMPRESS: NORMAL
BH CV LOWER VASCULAR RIGHT SAPHENOFEMORAL JUNCTION COMPRESS: NORMAL

## 2023-07-28 PROCEDURE — 93971 EXTREMITY STUDY: CPT

## 2023-07-31 ENCOUNTER — TELEPHONE (OUTPATIENT)
Dept: FAMILY MEDICINE CLINIC | Facility: CLINIC | Age: 63
End: 2023-07-31
Payer: COMMERCIAL

## 2023-08-04 ENCOUNTER — LAB (OUTPATIENT)
Dept: LAB | Facility: HOSPITAL | Age: 63
End: 2023-08-04
Payer: COMMERCIAL

## 2023-08-04 ENCOUNTER — OFFICE VISIT (OUTPATIENT)
Dept: FAMILY MEDICINE CLINIC | Facility: CLINIC | Age: 63
End: 2023-08-04
Payer: MEDICARE

## 2023-08-04 ENCOUNTER — HOSPITAL ENCOUNTER (OUTPATIENT)
Dept: GENERAL RADIOLOGY | Facility: HOSPITAL | Age: 63
Discharge: HOME | End: 2023-08-04
Payer: MEDICARE

## 2023-08-04 VITALS
WEIGHT: 232.4 LBS | RESPIRATION RATE: 18 BRPM | DIASTOLIC BLOOD PRESSURE: 62 MMHG | HEIGHT: 70 IN | HEART RATE: 66 BPM | OXYGEN SATURATION: 96 % | BODY MASS INDEX: 33.27 KG/M2 | SYSTOLIC BLOOD PRESSURE: 118 MMHG | TEMPERATURE: 97.7 F

## 2023-08-04 DIAGNOSIS — I10 PRIMARY HYPERTENSION: Chronic | ICD-10-CM

## 2023-08-04 DIAGNOSIS — R53.83 OTHER FATIGUE: ICD-10-CM

## 2023-08-04 DIAGNOSIS — A46 ERYSIPELAS: Primary | ICD-10-CM

## 2023-08-04 DIAGNOSIS — A46 ERYSIPELAS: ICD-10-CM

## 2023-08-04 DIAGNOSIS — M79.89 RIGHT LEG SWELLING: ICD-10-CM

## 2023-08-04 DIAGNOSIS — E66.09 CLASS 1 OBESITY DUE TO EXCESS CALORIES WITH SERIOUS COMORBIDITY AND BODY MASS INDEX (BMI) OF 33.0 TO 33.9 IN ADULT: Chronic | ICD-10-CM

## 2023-08-04 LAB
ALBUMIN SERPL-MCNC: 3.7 G/DL (ref 3.5–5.2)
ALBUMIN/GLOB SERPL: 1.2 G/DL
ALP SERPL-CCNC: 97 U/L (ref 39–117)
ALT SERPL W P-5'-P-CCNC: 31 U/L (ref 1–41)
ANION GAP SERPL CALCULATED.3IONS-SCNC: 8.8 MMOL/L (ref 5–15)
AST SERPL-CCNC: 34 U/L (ref 1–40)
BASOPHILS # BLD AUTO: 0.06 10*3/MM3 (ref 0–0.2)
BASOPHILS NFR BLD AUTO: 1.2 % (ref 0–1.5)
BILIRUB SERPL-MCNC: <0.2 MG/DL (ref 0–1.2)
BUN SERPL-MCNC: 11 MG/DL (ref 8–23)
BUN/CREAT SERPL: 12.2 (ref 7–25)
CALCIUM SPEC-SCNC: 8.8 MG/DL (ref 8.6–10.5)
CHLORIDE SERPL-SCNC: 96 MMOL/L (ref 98–107)
CO2 SERPL-SCNC: 29.2 MMOL/L (ref 22–29)
CREAT SERPL-MCNC: 0.9 MG/DL (ref 0.76–1.27)
CRP SERPL-MCNC: 0.87 MG/DL (ref 0–0.5)
DEPRECATED RDW RBC AUTO: 43 FL (ref 37–54)
EGFRCR SERPLBLD CKD-EPI 2021: 94.8 ML/MIN/1.73
EOSINOPHIL # BLD AUTO: 0.4 10*3/MM3 (ref 0–0.4)
EOSINOPHIL NFR BLD AUTO: 8.2 % (ref 0.3–6.2)
ERYTHROCYTE [DISTWIDTH] IN BLOOD BY AUTOMATED COUNT: 14.3 % (ref 12.3–15.4)
ERYTHROCYTE [SEDIMENTATION RATE] IN BLOOD: 28 MM/HR (ref 0–20)
GLOBULIN UR ELPH-MCNC: 3 GM/DL
GLUCOSE SERPL-MCNC: 80 MG/DL (ref 65–99)
HCT VFR BLD AUTO: 40.5 % (ref 37.5–51)
HGB BLD-MCNC: 13.2 G/DL (ref 13–17.7)
IMM GRANULOCYTES # BLD AUTO: 0.02 10*3/MM3 (ref 0–0.05)
IMM GRANULOCYTES NFR BLD AUTO: 0.4 % (ref 0–0.5)
LYMPHOCYTES # BLD AUTO: 1.15 10*3/MM3 (ref 0.7–3.1)
LYMPHOCYTES NFR BLD AUTO: 23.7 % (ref 19.6–45.3)
MCH RBC QN AUTO: 27.3 PG (ref 26.6–33)
MCHC RBC AUTO-ENTMCNC: 32.6 G/DL (ref 31.5–35.7)
MCV RBC AUTO: 83.9 FL (ref 79–97)
MONOCYTES # BLD AUTO: 0.6 10*3/MM3 (ref 0.1–0.9)
MONOCYTES NFR BLD AUTO: 12.4 % (ref 5–12)
NEUTROPHILS NFR BLD AUTO: 2.62 10*3/MM3 (ref 1.7–7)
NEUTROPHILS NFR BLD AUTO: 54.1 % (ref 42.7–76)
NRBC BLD AUTO-RTO: 0 /100 WBC (ref 0–0.2)
PLATELET # BLD AUTO: 242 10*3/MM3 (ref 140–450)
PMV BLD AUTO: 9.5 FL (ref 6–12)
POTASSIUM SERPL-SCNC: 4.9 MMOL/L (ref 3.5–5.2)
PROCALCITONIN SERPL-MCNC: 0.05 NG/ML (ref 0–0.25)
PROT SERPL-MCNC: 6.7 G/DL (ref 6–8.5)
RBC # BLD AUTO: 4.83 10*6/MM3 (ref 4.14–5.8)
SODIUM SERPL-SCNC: 134 MMOL/L (ref 136–145)
WBC NRBC COR # BLD: 4.85 10*3/MM3 (ref 3.4–10.8)

## 2023-08-04 PROCEDURE — 87798 DETECT AGENT NOS DNA AMP: CPT

## 2023-08-04 PROCEDURE — 87484 EHRLICHA CHAFFEENSIS AMP PRB: CPT

## 2023-08-04 PROCEDURE — 86140 C-REACTIVE PROTEIN: CPT

## 2023-08-04 PROCEDURE — 80053 COMPREHEN METABOLIC PANEL: CPT

## 2023-08-04 PROCEDURE — 85652 RBC SED RATE AUTOMATED: CPT

## 2023-08-04 PROCEDURE — 87468 ANAPLSMA PHGCYTOPHLM AMP PRB: CPT

## 2023-08-04 PROCEDURE — 36415 COLL VENOUS BLD VENIPUNCTURE: CPT

## 2023-08-04 PROCEDURE — 73590 X-RAY EXAM OF LOWER LEG: CPT

## 2023-08-04 PROCEDURE — 84145 PROCALCITONIN (PCT): CPT

## 2023-08-04 PROCEDURE — 85025 COMPLETE CBC W/AUTO DIFF WBC: CPT

## 2023-08-04 PROCEDURE — 86618 LYME DISEASE ANTIBODY: CPT

## 2023-08-04 RX ORDER — DOXYCYCLINE HYCLATE 100 MG/1
100 CAPSULE ORAL 2 TIMES DAILY
Qty: 20 CAPSULE | Refills: 0 | Status: SHIPPED | OUTPATIENT
Start: 2023-08-04

## 2023-08-06 LAB — B BURGDOR IGG+IGM SER QL IA: NEGATIVE

## 2023-08-09 ENCOUNTER — TELEPHONE (OUTPATIENT)
Dept: FAMILY MEDICINE CLINIC | Facility: CLINIC | Age: 63
End: 2023-08-09
Payer: COMMERCIAL

## 2023-08-09 ENCOUNTER — TELEPHONE (OUTPATIENT)
Dept: UROLOGY | Facility: CLINIC | Age: 63
End: 2023-08-09
Payer: COMMERCIAL

## 2023-08-09 NOTE — TELEPHONE ENCOUNTER
Called pt to see if he has had another urologist or oncologist since he saw us a few years ago. He said he has not.

## 2023-08-09 NOTE — TELEPHONE ENCOUNTER
Patients wife called abe, had some questions about husbands lab work went over them with her and she verbalized understanding. Patient is also following up at next appointment.

## 2023-08-11 PROBLEM — C61 PROSTATE CANCER: Status: ACTIVE | Noted: 2023-08-11

## 2023-08-11 LAB
A PHAGOCYTOPH DNA BLD QL NAA+PROBE: NEGATIVE
E CHAFFEENSIS DNA BLD QL NAA+PROBE: NEGATIVE
RICKETTSIA RICKETTSII DNA, RT: NOT DETECTED

## 2023-08-11 NOTE — PROGRESS NOTES
Chief Complaint    Urologic complaint    Subjective          Colton Chiang presents to [facility]  History of Present Illness         63-year-old  gentleman       Prostatic adenocarcinoma    status post XRT in 2008 for Roxanne 8   ED          Voiding okay.  No straining.  Nocturia bothersome, gets up about every hour.  No prostate meds  Patient has been on Lasix many years in the morning.  History of CHF been well controlled for several years    Patient has had some lower extremity edema on the right, he has been ruled out for DVT    PVR    8/23    023      Has ED, it is worrisome.    Patient does have some hip pain.  He is getting hip MRI in the month      Patient has had no gross hematuria, dysuria or recurrent urinary tract infections. No history of kidney or bladder stone.     never had any urologic surgery.    No cardiopulmonary history. He smokes 1.5 packs daily. Baby aspirin daily       10/22 CT abdomen/pelvis with - fractures of lateral right ninth 10th and 11th ribs.  Cholelithiasis      Prostate cancer history    6/23     2.2  5/22     1.8  12/21   1.9  9/20     1.2  2/17  0.51  8/16 0.38  2/16 0.46  7/15 0.35  1/15 0.41  7/14 0.24  1/12 0.30  7/10 0.5  7/09 0.7  12/08 0.2  2008 XRT prostate  10/07 prostate biopsyprostatic adenocarcinoma, 5+3    9/07 17.2          Past History:  Medical History: has a past medical history of Allergies, Arthritis, Blood disease, Broken bones, CHF (congestive heart failure) (1990), Chronic back pain, Deafness, bilateral, Depression, HBP (high blood pressure), Heart disease, High cholesterol, History of radiation therapy, Hypothyroid (02/10/2017), Prostate cancer (02/05/2016), 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   Surgical History: has a past surgical history that includes Colonoscopy and Prostate biopsy.   Family History: family history includes Breast cancer in his sister; Diabetes  in an other family member; Heart disease in an other family member.   Social History: reports that he has been smoking cigarettes. He started smoking about 51 years ago. He has a 75.00 pack-year smoking history. He has never used smokeless tobacco. He reports that he does not drink alcohol and does not use drugs.  Allergies: Penicillins       Current Outpatient Medications:     Ascorbic Acid (Vitamin C) 500 MG capsule, Take  by mouth., Disp: , Rfl:     Aspirin Low Dose 81 MG EC tablet, Take 1 tablet by mouth Daily., Disp: 90 tablet, Rfl: 2    atorvastatin (LIPITOR) 10 MG tablet, Take 1 tablet by mouth Daily., Disp: 90 tablet, Rfl: 3    Cholecalciferol (Vitamin D3) 50 MCG (2000 UT) capsule, TAKE 1 CAPSULE BY MOUTH EVERY DAY, Disp: 90 capsule, Rfl: 0    doxycycline (VIBRAMYCIN) 100 MG capsule, Take 1 capsule by mouth 2 (Two) Times a Day., Disp: 20 capsule, Rfl: 0    FeroSul 325 (65 Fe) MG tablet, TAKE 1 TABLET BY MOUTH EVERY DAY, Disp: 90 tablet, Rfl: 0    furosemide (LASIX) 40 MG tablet, Take a half a tab 1 day and a whole tab the next day.  Can take an extra half on a as needed basis, Disp: 90 tablet, Rfl: 3    levothyroxine (SYNTHROID, LEVOTHROID) 137 MCG tablet, Take 2 tablets in the am 30 minutes before breakfast., Disp: 180 tablet, Rfl: 1    liothyronine (CYTOMEL) 5 MCG tablet, TAKE 2 TABLETS DAILY, Disp: 180 tablet, Rfl: 1    Magnesium 250 MG tablet, Take  by mouth., Disp: , Rfl:     metoprolol succinate XL (TOPROL-XL) 25 MG 24 hr tablet, Take 1/2 tab po qd, Disp: 45 tablet, Rfl: 3    multivitamin with minerals tablet tablet, Take 1 tablet by mouth Daily., Disp: , Rfl:     Zinc 50 MG capsule, Take  by mouth., Disp: , Rfl:         Bladder Scan interpretation 08/14/2023    Estimation of residual urine via BVI 3000 Verathon Bladder Scan  MA/nurse performing: ANDERS Mayo  Residual Urine: 23 ml  Indication: Prostate cancer   Position: Supine  Examination: Incremental scanning of the suprapubic area using 2.0 MHz  transducer using copious amounts of acoustic gel.   Findings: An anechoic area was demonstrated which represented the bladder, with measurement of residual urine as noted. I inspected this myself. In that the residual urine was stable or insignificant, refer to plan for treatment and plan necessary at this time.          Objective     Vital Signs:   There were no vitals taken for this visit.             Assessment and Plan    Diagnoses and all orders for this visit:    1. Prostate cancer (Primary)      Trace blood, UA with micro    Records reviewed today and summarized in chart      PSA has been slowly climbing, discussed possibility of recurrence.  I will repeat a PSA in 2 months which will 4 months from his last PSA to see if it continues to be above 2.0    If this is the case we will consider PSMA scan at that point.    Patient understands we are ruling out prostate cancer recurrence and he must follow-up

## 2023-08-14 ENCOUNTER — OFFICE VISIT (OUTPATIENT)
Dept: UROLOGY | Facility: CLINIC | Age: 63
End: 2023-08-14
Payer: MEDICARE

## 2023-08-14 VITALS — RESPIRATION RATE: 16 BRPM | BODY MASS INDEX: 32.3 KG/M2 | WEIGHT: 225.6 LBS | HEIGHT: 70 IN

## 2023-08-14 DIAGNOSIS — C61 PROSTATE CANCER: Primary | ICD-10-CM

## 2023-08-14 LAB
BACTERIA UR QL AUTO: NORMAL /HPF
BILIRUB BLD-MCNC: NEGATIVE MG/DL
BILIRUB UR QL STRIP: NEGATIVE
CLARITY UR: CLEAR
CLARITY, POC: CLEAR
COLOR UR: YELLOW
COLOR UR: YELLOW
EXPIRATION DATE: ABNORMAL
GLUCOSE UR STRIP-MCNC: NEGATIVE MG/DL
GLUCOSE UR STRIP-MCNC: NEGATIVE MG/DL
HGB UR QL STRIP.AUTO: NEGATIVE
HYALINE CASTS UR QL AUTO: NORMAL /LPF
KETONES UR QL STRIP: NEGATIVE
KETONES UR QL: NEGATIVE
LEUKOCYTE EST, POC: NEGATIVE
LEUKOCYTE ESTERASE UR QL STRIP.AUTO: NEGATIVE
Lab: ABNORMAL
NITRITE UR QL STRIP: NEGATIVE
NITRITE UR-MCNC: NEGATIVE MG/ML
PH UR STRIP.AUTO: 6.5 [PH] (ref 5–8)
PH UR: 6 [PH] (ref 5–8)
PROT UR QL STRIP: NEGATIVE
PROT UR STRIP-MCNC: NEGATIVE MG/DL
RBC # UR STRIP: ABNORMAL /UL
RBC # UR STRIP: NORMAL /HPF
REF LAB TEST METHOD: NORMAL
SP GR UR STRIP: 1.02 (ref 1–1.03)
SP GR UR: 1.02 (ref 1–1.03)
SPECIMEN VOL 24H UR: 23 L
SQUAMOUS #/AREA URNS HPF: NORMAL /HPF
UROBILINOGEN UR QL STRIP: NORMAL
UROBILINOGEN UR QL: ABNORMAL
WBC # UR STRIP: NORMAL /HPF

## 2023-08-14 PROCEDURE — 81001 URINALYSIS AUTO W/SCOPE: CPT | Performed by: UROLOGY

## 2023-08-14 PROCEDURE — 99213 OFFICE O/P EST LOW 20 MIN: CPT | Performed by: UROLOGY

## 2023-08-14 PROCEDURE — 81003 URINALYSIS AUTO W/O SCOPE: CPT | Performed by: UROLOGY

## 2023-08-14 PROCEDURE — 1159F MED LIST DOCD IN RCRD: CPT | Performed by: UROLOGY

## 2023-08-14 PROCEDURE — 51798 US URINE CAPACITY MEASURE: CPT | Performed by: UROLOGY

## 2023-08-14 PROCEDURE — 1160F RVW MEDS BY RX/DR IN RCRD: CPT | Performed by: UROLOGY

## 2023-08-15 ENCOUNTER — OFFICE VISIT (OUTPATIENT)
Dept: FAMILY MEDICINE CLINIC | Facility: CLINIC | Age: 63
End: 2023-08-15
Payer: MEDICARE

## 2023-08-15 ENCOUNTER — TELEPHONE (OUTPATIENT)
Dept: FAMILY MEDICINE CLINIC | Facility: CLINIC | Age: 63
End: 2023-08-15

## 2023-08-15 VITALS
SYSTOLIC BLOOD PRESSURE: 126 MMHG | RESPIRATION RATE: 18 BRPM | BODY MASS INDEX: 32.27 KG/M2 | OXYGEN SATURATION: 93 % | HEIGHT: 70 IN | TEMPERATURE: 97.7 F | HEART RATE: 67 BPM | WEIGHT: 225.4 LBS | DIASTOLIC BLOOD PRESSURE: 73 MMHG

## 2023-08-15 DIAGNOSIS — I10 PRIMARY HYPERTENSION: Chronic | ICD-10-CM

## 2023-08-15 DIAGNOSIS — E66.09 CLASS 1 OBESITY DUE TO EXCESS CALORIES WITH SERIOUS COMORBIDITY AND BODY MASS INDEX (BMI) OF 32.0 TO 32.9 IN ADULT: ICD-10-CM

## 2023-08-15 DIAGNOSIS — E78.5 HYPERLIPIDEMIA LDL GOAL <100: Chronic | ICD-10-CM

## 2023-08-15 DIAGNOSIS — F17.219 CIGARETTE NICOTINE DEPENDENCE WITH NICOTINE-INDUCED DISORDER: Chronic | ICD-10-CM

## 2023-08-15 DIAGNOSIS — R29.898 RIGHT LEG WEAKNESS: Primary | ICD-10-CM

## 2023-08-15 PROBLEM — E66.811 CLASS 1 OBESITY DUE TO EXCESS CALORIES WITH SERIOUS COMORBIDITY AND BODY MASS INDEX (BMI) OF 33.0 TO 33.9 IN ADULT: Chronic | Status: RESOLVED | Noted: 2021-06-11 | Resolved: 2023-08-15

## 2023-08-15 PROCEDURE — 3074F SYST BP LT 130 MM HG: CPT | Performed by: FAMILY MEDICINE

## 2023-08-15 PROCEDURE — 3078F DIAST BP <80 MM HG: CPT | Performed by: FAMILY MEDICINE

## 2023-08-15 PROCEDURE — 99214 OFFICE O/P EST MOD 30 MIN: CPT | Performed by: FAMILY MEDICINE

## 2023-08-15 RX ORDER — EZETIMIBE 10 MG/1
10 TABLET ORAL DAILY
Qty: 90 TABLET | Refills: 1 | Status: SHIPPED | OUTPATIENT
Start: 2023-08-15 | End: 2023-08-15 | Stop reason: SDUPTHER

## 2023-08-15 RX ORDER — EZETIMIBE 10 MG/1
10 TABLET ORAL DAILY
Qty: 90 TABLET | Refills: 1 | Status: SHIPPED | OUTPATIENT
Start: 2023-08-15 | End: 2023-08-21

## 2023-08-15 NOTE — ASSESSMENT & PLAN NOTE
He stopped atorvastatin and his aches and pains improved.  We will plan on Zetia and will follow back up at his next clinic appointment manage his cholesterol according to findings.

## 2023-08-15 NOTE — TELEPHONE ENCOUNTER
Patient was prescribed Zetia today, out of pocket cost is too expensive, wanting to know if there is an alternative medication

## 2023-08-15 NOTE — PROGRESS NOTES
"Chief Complaint  Results (Lab results ) and Leg Pain (Right leg pain)    Subjective        Colton Chiang presents to [facility]  History of Present Illness  The patient is here today for a follow-up for the management of his chronic medical conditions.  He is  with one daughter.  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 He has a family history of breast cancer.     The patient is still continuing to smoke.     The patient is continue to take 2-5 mcg Cytomel and 2-125 mcg levothyroxine daily.  His most recent TSH on 8/9/22 was found to be 1.08.  He denies feeling any more fatigued than he usually does.  The patient works a lot of hours and stays tired a lot.     He does not check his blood pressure at home. He is followed by cardiology. He states his blood pressure is normally 90-100s/80s.     His right lower leg swelling, pain and redness has improved.      He still is having right leg pain and weakness with foot drop. His pain originates from his upper right hip.       The patient has no other complaints today and denies chest pain, shortness of breath, weakness, numbness, nausea, vomiting, diarrhea, dizziness or syncopal event.      Objective   Vital Signs:  /73 (BP Location: Left arm, Patient Position: Sitting, Cuff Size: Adult)   Pulse 67   Temp 97.7 øF (36.5 øC) (Tympanic)   Resp 18   Ht 177.8 cm (70\")   Wt 102 kg (225 lb 6.4 oz)   SpO2 93%   BMI 32.34 kg/mý   Estimated body mass index is 32.34 kg/mý as calculated from the following:    Height as of this encounter: 177.8 cm (70\").    Weight as of this encounter: 102 kg (225 lb 6.4 oz).               Physical Exam  Vitals reviewed.   Constitutional:       Appearance: He is well-developed. He is obese.   HENT:      " Head: Normocephalic and atraumatic.      Right Ear: External ear normal.      Left Ear: External ear normal.      Mouth/Throat:      Pharynx: No oropharyngeal exudate.   Eyes:      Conjunctiva/sclera: Conjunctivae normal.      Pupils: Pupils are equal, round, and reactive to light.   Neck:      Vascular: No carotid bruit.   Cardiovascular:      Rate and Rhythm: Normal rate and regular rhythm.      Heart sounds: No murmur heard.    No friction rub. No gallop.   Pulmonary:      Effort: Pulmonary effort is normal.      Breath sounds: Normal breath sounds. No wheezing or rhonchi.   Abdominal:      General: There is no distension.   Skin:     General: Skin is warm and dry.   Neurological:      Mental Status: He is alert and oriented to person, place, and time.      Cranial Nerves: No cranial nerve deficit.      Motor: No weakness.   Psychiatric:         Mood and Affect: Mood and affect normal.         Behavior: Behavior normal.         Thought Content: Thought content normal.         Judgment: Judgment normal.      Result Review :    CMP          10/24/2022    17:58 6/23/2023    08:06 8/4/2023    13:53   CMP   Glucose 112  92  80    BUN 17  12  11    Creatinine 0.86  0.77  0.90    EGFR 96.7  99.4  94.8    Sodium 134  138  134    Potassium 4.3  4.8  4.9    Chloride 95  96  96    Calcium 9.2  9.6  8.8    Total Protein 7.8  7.0  6.7    Albumin 4.50  4.0  3.7    Globulin 3.3  3.0  3.0    Total Bilirubin 0.4  0.2  <0.2    Alkaline Phosphatase 109  100  97    AST (SGOT) 44  30  34    ALT (SGPT) 40  31  31    Albumin/Globulin Ratio 1.4  1.3  1.2    BUN/Creatinine Ratio 19.8  15.6  12.2    Anion Gap 11.4  12.4  8.8      CBC          10/24/2022    17:58 6/23/2023    08:06 8/4/2023    13:53   CBC   WBC 18.80  6.27  4.85    RBC 5.04  4.96  4.83    Hemoglobin 14.1  13.4  13.2    Hematocrit 43.2  40.3  40.5    MCV 85.7  81.3  83.9    MCH 28.0  27.0  27.3    MCHC 32.6  33.3  32.6    RDW 14.2  13.3  14.3    Platelets 255  257  242       Lipid Panel          6/23/2023    08:06   Lipid Panel   Total Cholesterol 142    Triglycerides 39    HDL Cholesterol 42    VLDL Cholesterol 9    LDL Cholesterol  91    LDL/HDL Ratio 2.20      TSH          6/23/2023    08:06   TSH   TSH 4.000                   Assessment and Plan   Diagnoses and all orders for this visit:    1. Right leg weakness (Primary)  Comments:  He has an MRI pending of his lumbar spine. I believe that his back is the source of his issues so hopefully after the MRI nurosurgery can help him.    2. Class 1 obesity due to excess calories with serious comorbidity and body mass index (BMI) of 32.0 to 32.9 in adult  Assessment & Plan:  Patient's (Body mass index is 32.34 kg/mý.) indicates that they are obese (BMI >30) with health conditions that include hypertension and dyslipidemias . Weight is improving with treatment. BMI  is above average; BMI management plan is completed. We discussed low calorie, low carb based diet program, portion control, and increasing exercise.       3. Cigarette nicotine dependence with nicotine-induced disorder  Assessment & Plan:  Tobacco use is unchanged.  Smoking cessation counseling was provided.  Tobacco use will be reassessed at the next regular appointment.      4. Primary hypertension  Assessment & Plan:  Hypertension is improving with treatment.  Continue current treatment regimen.  Dietary sodium restriction.  Weight loss.  Blood pressure will be reassessed at the next regular appointment.      5. Hyperlipidemia LDL goal <100  Assessment & Plan:  He stopped atorvastatin and his aches and pains improved.  We will plan on Zetia and will follow back up at his next clinic appointment manage his cholesterol according to findings.    -     ezetimibe (Zetia) 10 MG tablet; Take 1 tablet by mouth Daily.  Dispense: 90 tablet; Refill: 1             Follow Up   Return if symptoms worsen or fail to improve.  Patient was given instructions and counseling regarding his  condition or for health maintenance advice. Please see specific information pulled into the AVS if appropriate.

## 2023-08-15 NOTE — TELEPHONE ENCOUNTER
Caller: Colton Chiang    Relationship: Self    Best call back number: [phone]    What is the best time to reach you: ANYTIME     Who are you requesting to speak with (clinical staff, provider,  specific staff member): CLINICAL         What was the call regarding: PATIENT SPOUSE IS CALLING IN REGARDS TO A ALTERNATIVE MEDICATION FOR RECENTLY PRESCRIBED CHOLESTEROL MEDICATION , OUT OF POCKET COST TO EXPENSIVE.

## 2023-08-15 NOTE — ASSESSMENT & PLAN NOTE
Patient's (Body mass index is 32.34 kg/mý.) indicates that they are obese (BMI >30) with health conditions that include hypertension and dyslipidemias . Weight is improving with treatment. BMI  is above average; BMI management plan is completed. We discussed low calorie, low carb based diet program, portion control, and increasing exercise.

## 2023-08-21 RX ORDER — SPIRONOLACT/HYDROCHLOROTHIAZID 25 MG-25MG
1 TABLET ORAL EVERY OTHER DAY
Qty: 30 EACH | Refills: 5 | Status: SHIPPED | OUTPATIENT
Start: 2023-08-21

## 2023-08-21 RX ORDER — PRAVASTATIN SODIUM 40 MG
40 TABLET ORAL DAILY
Qty: 30 TABLET | Refills: 5 | Status: SHIPPED | OUTPATIENT
Start: 2023-08-21

## 2023-08-31 ENCOUNTER — HOSPITAL ENCOUNTER (OUTPATIENT)
Dept: MRI IMAGING | Facility: HOSPITAL | Age: 63
Discharge: HOME | End: 2023-08-31
Admitting: FAMILY MEDICINE
Payer: MEDICARE

## 2023-08-31 DIAGNOSIS — M54.16 LUMBAR RADICULOPATHY: ICD-10-CM

## 2023-08-31 PROCEDURE — 72148 MRI LUMBAR SPINE W/O DYE: CPT

## 2023-09-05 ENCOUNTER — TELEPHONE (OUTPATIENT)
Dept: FAMILY MEDICINE CLINIC | Facility: CLINIC | Age: 63
End: 2023-09-05
Payer: COMMERCIAL

## 2023-09-05 DIAGNOSIS — M54.16 LUMBAR RADICULOPATHY: ICD-10-CM

## 2023-09-05 DIAGNOSIS — M51.36 BULGING LUMBAR DISC: Primary | ICD-10-CM

## 2023-09-05 DIAGNOSIS — M79.89 RIGHT LEG SWELLING: ICD-10-CM

## 2023-09-05 NOTE — TELEPHONE ENCOUNTER
Patient completed MRI on 08/31, is curious about results from imaging    Also still having trouble with right leg swelling, not sure if another antibiotic is needed to help like last time.    He also can not tolerate the new cholesterol medication. He stated his back hurts bad enough and even more so with the medication. He stopped taking it yesterday

## 2023-09-21 RX ORDER — DOXYCYCLINE HYCLATE 100 MG/1
100 CAPSULE ORAL 2 TIMES DAILY
Qty: 20 CAPSULE | Refills: 0 | Status: SHIPPED | OUTPATIENT
Start: 2023-09-21

## 2023-09-21 NOTE — TELEPHONE ENCOUNTER
Spoke with patient's wife, let her know referral to Neurosurgery has been placed due to patient's bulging discs in lumbar region    Patient is still having right leg swelling and is red. Per Dr Butt, please send doxycycline 100mg for patient

## 2023-09-27 RX ORDER — LIOTHYRONINE SODIUM 5 UG/1
TABLET ORAL
Qty: 180 TABLET | Refills: 1 | Status: SHIPPED | OUTPATIENT
Start: 2023-09-27

## 2023-10-06 ENCOUNTER — LAB (OUTPATIENT)
Dept: LAB | Facility: HOSPITAL | Age: 63
End: 2023-10-06
Payer: MEDICARE

## 2023-10-06 DIAGNOSIS — C61 PROSTATE CANCER: ICD-10-CM

## 2023-10-06 LAB — PSA SERPL-MCNC: 3.17 NG/ML (ref 0–4)

## 2023-10-06 PROCEDURE — 36415 COLL VENOUS BLD VENIPUNCTURE: CPT

## 2023-10-06 PROCEDURE — 84153 ASSAY OF PSA TOTAL: CPT

## 2023-10-10 ENCOUNTER — TELEPHONE (OUTPATIENT)
Dept: CARDIOLOGY | Facility: CLINIC | Age: 63
End: 2023-10-10
Payer: MEDICARE

## 2023-10-10 NOTE — TELEPHONE ENCOUNTER
The [facility] received a fax that requires your attention. The document has been indexed to the patient's chart for your review.      Reason for sending: EXTERNAL MEDICAL RECORD NOTIFICATION     Documents Description: MEDS-METOPROL RX RENEWAL REQ-10.9.23    Name of Sender: [facility]     Date Indexed: 10.9.23

## 2023-10-11 NOTE — PROGRESS NOTES
Chief Complaint    Urologic complaint    Subjective          Colton Chiang presents to [facility]  History of Present Illness         63-year-old  gentleman       Prostatic adenocarcinoma    status post XRT in 2008 for Roxanne 8   ED        Voiding okay.  No straining.    Nocturia every hour, bothersome  No prostate meds    Patient recently stopped his statin, it was giving him a lot of trouble side effects.  Doing better since     on Lasix many years in the morning.      Patient did do Lupron before when he did his original XRT      PVR    8/23    023      Previous      History of CHF been well controlled for several years    Patient has had some lower extremity edema on the right, he has been ruled out for DVT      Has ED, it is worrisome.    Patient does have some hip pain.  He is getting hip MRI in the month      Patient has had no gross hematuria, dysuria or recurrent urinary tract infections. No history of kidney or bladder stone.     never had any urologic surgery.    No cardiopulmonary history. He smokes 1.5 packs daily. Baby aspirin daily       10/22 CT abdomen/pelvis with - fractures of lateral right ninth 10th and 11th ribs.  Cholelithiasis      Prostate cancer history    10/23    3.17     6/23      2.2  5/22      1.8  12/21    1.9  9/20     1.2  2/17    0.51  8/16   0.38  2/16 0.46  7/15 0.35  1/15 0.41  7/14 0.24  1/12 0.30  7/10 0.5  7/09 0.7  12/08 0.2  2008 XRT prostate  10/07 prostate biopsyprostatic adenocarcinoma, 5+3    9/07 17.2          Past History:  Medical History: has a past medical history of Allergies, Arthritis, Blood disease, Broken bones, CHF (congestive heart failure) (1990), Chronic back pain, Deafness, bilateral, Depression, HBP (high blood pressure), Heart disease, High cholesterol, History of radiation therapy, Hypothyroid (02/10/2017), Prostate cancer (02/05/2016), 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   Surgical History: has a past surgical history that includes Colonoscopy and Prostate biopsy.   Family History: family history includes Breast cancer in his sister; Diabetes in an other family member; Heart disease in an other family member.   Social History: reports that he has been smoking cigarettes. He started smoking about 51 years ago. He has a 75.00 pack-year smoking history. He has never used smokeless tobacco. He reports that he does not drink alcohol and does not use drugs.  Allergies: Penicillins       Current Outpatient Medications:     Ascorbic Acid (Vitamin C) 500 MG capsule, Take  by mouth., Disp: , Rfl:     Aspirin Low Dose 81 MG EC tablet, Take 1 tablet by mouth Daily., Disp: 90 tablet, Rfl: 2    Cholecalciferol (Vitamin D3) 50 MCG (2000 UT) capsule, TAKE 1 CAPSULE BY MOUTH EVERY DAY, Disp: 90 capsule, Rfl: 0    Coenzyme Q10 (Co Q-10) 300 MG capsule, Take 1 capsule by mouth Every Other Day., Disp: 30 each, Rfl: 5    doxycycline (VIBRAMYCIN) 100 MG capsule, Take 1 capsule by mouth 2 (Two) Times a Day., Disp: 20 capsule, Rfl: 0    FeroSul 325 (65 Fe) MG tablet, TAKE 1 TABLET BY MOUTH EVERY DAY, Disp: 90 tablet, Rfl: 0    furosemide (LASIX) 40 MG tablet, Take a half a tab 1 day and a whole tab the next day.  Can take an extra half on a as needed basis, Disp: 90 tablet, Rfl: 3    levothyroxine (SYNTHROID, LEVOTHROID) 137 MCG tablet, Take 2 tablets in the am 30 minutes before breakfast., Disp: 180 tablet, Rfl: 1    liothyronine (CYTOMEL) 5 MCG tablet, TAKE 2 TABLETS DAILY, Disp: 180 tablet, Rfl: 1    Magnesium 250 MG tablet, Take  by mouth., Disp: , Rfl:     metoprolol succinate XL (TOPROL-XL) 25 MG 24 hr tablet, Take 1/2 tab po qd, Disp: 45 tablet, Rfl: 3    multivitamin with minerals tablet tablet, Take 1 tablet by mouth Daily., Disp: , Rfl:     pravastatin (Pravachol) 40 MG tablet, Take 1 tablet by mouth Daily., Disp: 30 tablet, Rfl: 5    Zinc 50 MG capsule, Take  by mouth., Disp: ,  Rfl:          Objective     Vital Signs:   There were no vitals taken for this visit.             Assessment and Plan    Diagnoses and all orders for this visit:    1. Prostate cancer (Primary)        PSA has increased even more.  Looks to be even more worrisome for prostatic adenocarcinoma recurrence.  Discussed this with the patient.  At this time I do think we need to go ahead imaging in the form of a PSMA scan.    Risk and benefits discussed.    We discussed we are ruling out recurrent/metastatic prostate cancer and patient is to follow-up.    We discussed options would be starting testosterone suppression versus more localized treatment based on what his PET scan shows.  Patient voiced understanding    Follow-up after PET scan

## 2023-10-13 ENCOUNTER — OFFICE VISIT (OUTPATIENT)
Dept: UROLOGY | Facility: CLINIC | Age: 63
End: 2023-10-13
Payer: MEDICARE

## 2023-10-13 DIAGNOSIS — I10 ESSENTIAL HYPERTENSION: ICD-10-CM

## 2023-10-13 DIAGNOSIS — C61 PROSTATE CANCER: Primary | ICD-10-CM

## 2023-10-13 RX ORDER — METOPROLOL SUCCINATE 25 MG/1
TABLET, EXTENDED RELEASE ORAL
Qty: 45 TABLET | Refills: 3 | Status: SHIPPED | OUTPATIENT
Start: 2023-10-13

## 2023-10-13 NOTE — TELEPHONE ENCOUNTER
Caller: DIONI HAWKINS    Relationship: Emergency Contact    Best call back number: [phone]     Requested Prescriptions:   Requested Prescriptions     Pending Prescriptions Disp Refills    metoprolol succinate XL (TOPROL-XL) 25 MG 24 hr tablet 45 tablet 3     Sig: Take 1/2 tab po qd        Pharmacy where request should be sent:  [facility]    Last office visit with prescribing clinician: 5/19/2023   Last telemedicine visit with prescribing clinician: Visit date not found   Next office visit with prescribing clinician: 2/20/2024     Additional details provided by patient:    Does the patient have less than a 3 day supply:  [] Yes  [x] No    Would you like a call back once the refill request has been completed: [] Yes [x] No    If the office needs to give you a call back, can they leave a voicemail: [x] Yes [] No ONLY ON HOME NUMBER    Florentin Hassan Rep   10/13/23 09:17 EDT

## 2023-10-18 ENCOUNTER — TELEPHONE (OUTPATIENT)
Dept: UROLOGY | Facility: CLINIC | Age: 63
End: 2023-10-18
Payer: MEDICARE

## 2023-10-18 NOTE — TELEPHONE ENCOUNTER
Spoke to pt spouse giving her PET Scan appt information.    [facility] ([address], [address])  11/01/23: arrive at 1030 for registration, injection at 1100, scan at 1215. No prep.     Follow up with Dr Joel on 11/10/23 at 0815 as previously scheduled.     Pt spouse verbalized understanding of all instruction via teach back.

## 2023-10-25 ENCOUNTER — TELEPHONE (OUTPATIENT)
Dept: UROLOGY | Facility: CLINIC | Age: 63
End: 2023-10-25
Payer: MEDICARE

## 2023-10-25 DIAGNOSIS — C61 MALIGNANT NEOPLASM OF PROSTATE: Primary | ICD-10-CM

## 2023-10-25 DIAGNOSIS — R97.21 INCREASING PROSTATE SPECIFIC ANTIGEN (PSA) LEVEL AFTER TREATMENT FOR MALIGNANT NEOPLASM OF PROSTATE: ICD-10-CM

## 2023-10-25 NOTE — TELEPHONE ENCOUNTER
TRISTAN CALLED FROM [facility].  PATIENT IS SCHEDULED FOR A PYLARIFY PET SCAN.  SHE SAID THIS IS RESTAGING, AND HE HAS HAD TREATMENT.  FOR MEDICARE COMPLIANCE, IT HAS TO HAVE DIAGNOSIS CODES C61 AND R97.21 BOTH ON THE ORDER.  SHE SAID THE R97.21 NEEDS TO BE ADDED.  IF DR. DURAN CANNOT ADD, A NEW ORDER WITH BOTH WILL NEED TO BE PLACED.

## 2023-10-29 ENCOUNTER — HOSPITAL ENCOUNTER (INPATIENT)
Facility: HOSPITAL | Age: 63
LOS: 4 days | Discharge: HOME HEALTH | End: 2023-11-02
Attending: EMERGENCY MEDICINE | Admitting: STUDENT IN AN ORGANIZED HEALTH CARE EDUCATION/TRAINING PROGRAM
Payer: MEDICARE

## 2023-10-29 ENCOUNTER — APPOINTMENT (OUTPATIENT)
Dept: GENERAL RADIOLOGY | Facility: HOSPITAL | Age: 63
End: 2023-10-29
Payer: MEDICARE

## 2023-10-29 DIAGNOSIS — J44.9 CHRONIC OBSTRUCTIVE PULMONARY DISEASE, UNSPECIFIED COPD TYPE: ICD-10-CM

## 2023-10-29 DIAGNOSIS — S72.141A CLOSED INTERTROCHANTERIC FRACTURE OF HIP, RIGHT, INITIAL ENCOUNTER: Primary | ICD-10-CM
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
## 2023-10-29 DIAGNOSIS — R26.2 DIFFICULTY IN WALKING: ICD-10-CM

## 2023-10-29 DIAGNOSIS — Z78.9 DECREASED ACTIVITIES OF DAILY LIVING (ADL): ICD-10-CM

## 2023-10-29 DIAGNOSIS — R26.2 DIFFICULTY WALKING: ICD-10-CM
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
## 2023-10-29 LAB
ALBUMIN SERPL-MCNC: 3.8 G/DL (ref 3.5–5.2)
ALBUMIN/GLOB SERPL: 1.1 G/DL
ALP SERPL-CCNC: 98 U/L (ref 39–117)
ALT SERPL W P-5'-P-CCNC: 29 U/L (ref 1–41)
ANION GAP SERPL CALCULATED.3IONS-SCNC: 9.2 MMOL/L (ref 5–15)
APTT PPP: 34.6 SECONDS (ref 78–95.9)
AST SERPL-CCNC: 26 U/L (ref 1–40)
BASOPHILS # BLD AUTO: 0.04 10*3/MM3 (ref 0–0.2)
BASOPHILS NFR BLD AUTO: 0.3 % (ref 0–1.5)
BILIRUB SERPL-MCNC: 0.3 MG/DL (ref 0–1.2)
BUN SERPL-MCNC: 15 MG/DL (ref 8–23)
BUN/CREAT SERPL: 20.5 (ref 7–25)
CALCIUM SPEC-SCNC: 8.9 MG/DL (ref 8.6–10.5)
CHLORIDE SERPL-SCNC: 95 MMOL/L (ref 98–107)
CO2 SERPL-SCNC: 27.8 MMOL/L (ref 22–29)
CREAT SERPL-MCNC: 0.73 MG/DL (ref 0.76–1.27)
DEPRECATED RDW RBC AUTO: 42.6 FL (ref 37–54)
EGFRCR SERPLBLD CKD-EPI 2021: 101 ML/MIN/1.73
EOSINOPHIL # BLD AUTO: 0.18 10*3/MM3 (ref 0–0.4)
EOSINOPHIL NFR BLD AUTO: 1.5 % (ref 0.3–6.2)
ERYTHROCYTE [DISTWIDTH] IN BLOOD BY AUTOMATED COUNT: 13.8 % (ref 12.3–15.4)
GLOBULIN UR ELPH-MCNC: 3.5 GM/DL
GLUCOSE SERPL-MCNC: 103 MG/DL (ref 65–99)
HCT VFR BLD AUTO: 39.6 % (ref 37.5–51)
HGB BLD-MCNC: 12.8 G/DL (ref 13–17.7)
IMM GRANULOCYTES # BLD AUTO: 0.05 10*3/MM3 (ref 0–0.05)
IMM GRANULOCYTES NFR BLD AUTO: 0.4 % (ref 0–0.5)
LIPASE SERPL-CCNC: 14 U/L (ref 13–60)
LYMPHOCYTES # BLD AUTO: 0.95 10*3/MM3 (ref 0.7–3.1)
LYMPHOCYTES NFR BLD AUTO: 7.8 % (ref 19.6–45.3)
MCH RBC QN AUTO: 27.6 PG (ref 26.6–33)
MCHC RBC AUTO-ENTMCNC: 32.3 G/DL (ref 31.5–35.7)
MCV RBC AUTO: 85.3 FL (ref 79–97)
MONOCYTES # BLD AUTO: 0.86 10*3/MM3 (ref 0.1–0.9)
MONOCYTES NFR BLD AUTO: 7.1 % (ref 5–12)
NEUTROPHILS NFR BLD AUTO: 10.04 10*3/MM3 (ref 1.7–7)
NEUTROPHILS NFR BLD AUTO: 82.9 % (ref 42.7–76)
NRBC BLD AUTO-RTO: 0 /100 WBC (ref 0–0.2)
PLATELET # BLD AUTO: 255 10*3/MM3 (ref 140–450)
PMV BLD AUTO: 9.4 FL (ref 6–12)
POTASSIUM SERPL-SCNC: 4.3 MMOL/L (ref 3.5–5.2)
PROT SERPL-MCNC: 7.3 G/DL (ref 6–8.5)
RBC # BLD AUTO: 4.64 10*6/MM3 (ref 4.14–5.8)
SODIUM SERPL-SCNC: 132 MMOL/L (ref 136–145)
WBC NRBC COR # BLD: 12.12 10*3/MM3 (ref 3.4–10.8)

## 2023-10-29 PROCEDURE — 25010000002 ONDANSETRON PER 1 MG: Performed by: EMERGENCY MEDICINE

## 2023-10-29 PROCEDURE — 85730 THROMBOPLASTIN TIME PARTIAL: CPT | Performed by: EMERGENCY MEDICINE

## 2023-10-29 PROCEDURE — 84443 ASSAY THYROID STIM HORMONE: CPT | Performed by: STUDENT IN AN ORGANIZED HEALTH CARE EDUCATION/TRAINING PROGRAM

## 2023-10-29 PROCEDURE — 73502 X-RAY EXAM HIP UNI 2-3 VIEWS: CPT

## 2023-10-29 PROCEDURE — 83690 ASSAY OF LIPASE: CPT | Performed by: EMERGENCY MEDICINE

## 2023-10-29 PROCEDURE — 99285 EMERGENCY DEPT VISIT HI MDM: CPT

## 2023-10-29 PROCEDURE — 80053 COMPREHEN METABOLIC PANEL: CPT | Performed by: EMERGENCY MEDICINE

## 2023-10-29 PROCEDURE — 85025 COMPLETE CBC W/AUTO DIFF WBC: CPT | Performed by: EMERGENCY MEDICINE

## 2023-10-29 PROCEDURE — 25010000002 HYDROMORPHONE 1 MG/ML SOLUTION: Performed by: EMERGENCY MEDICINE

## 2023-10-29 RX ORDER — ONDANSETRON 2 MG/ML
4 INJECTION INTRAMUSCULAR; INTRAVENOUS ONCE
Status: COMPLETED | OUTPATIENT
Start: 2023-10-29 | End: 2023-10-29

## 2023-10-29 RX ORDER — SODIUM CHLORIDE 0.9 % (FLUSH) 0.9 %
10 SYRINGE (ML) INJECTION AS NEEDED
Status: DISCONTINUED | OUTPATIENT
Start: 2023-10-29 | End: 2023-11-02 | Stop reason: HOSPADM

## 2023-10-29 RX ORDER — NICOTINE 21 MG/24HR
1 PATCH, TRANSDERMAL 24 HOURS TRANSDERMAL ONCE
Status: DISCONTINUED | OUTPATIENT
Start: 2023-10-29 | End: 2023-10-30 | Stop reason: SDUPTHER

## 2023-10-29 RX ADMIN — NICOTINE 1 PATCH: 21 PATCH, EXTENDED RELEASE TRANSDERMAL at 22:51

## 2023-10-29 RX ADMIN — HYDROMORPHONE HYDROCHLORIDE 0.5 MG: 1 INJECTION, SOLUTION INTRAMUSCULAR; INTRAVENOUS; SUBCUTANEOUS at 21:40

## 2023-10-29 RX ADMIN — ONDANSETRON 4 MG: 2 INJECTION INTRAMUSCULAR; INTRAVENOUS at 21:40

## 2023-10-30 ENCOUNTER — ANESTHESIA EVENT (OUTPATIENT)
Dept: PERIOP | Facility: HOSPITAL | Age: 63
End: 2023-10-30
Payer: MEDICARE

## 2023-10-30 ENCOUNTER — ANESTHESIA (OUTPATIENT)
Dept: PERIOP | Facility: HOSPITAL | Age: 63
End: 2023-10-30
Payer: MEDICARE

## 2023-10-30 ENCOUNTER — APPOINTMENT (OUTPATIENT)
Dept: GENERAL RADIOLOGY | Facility: HOSPITAL | Age: 63
End: 2023-10-30
Payer: MEDICARE

## 2023-10-30 PROBLEM — S72.141A CLOSED INTERTROCHANTERIC FRACTURE OF HIP, RIGHT, INITIAL ENCOUNTER: Status: ACTIVE | Noted: 2023-10-29

## 2023-10-30 LAB
ALBUMIN SERPL-MCNC: 3.2 G/DL (ref 3.5–5.2)
ALBUMIN/GLOB SERPL: 1 G/DL
ALP SERPL-CCNC: 84 U/L (ref 39–117)
ALT SERPL W P-5'-P-CCNC: 25 U/L (ref 1–41)
ANION GAP SERPL CALCULATED.3IONS-SCNC: 8.5 MMOL/L (ref 5–15)
AST SERPL-CCNC: 22 U/L (ref 1–40)
BILIRUB SERPL-MCNC: 0.2 MG/DL (ref 0–1.2)
BUN SERPL-MCNC: 15 MG/DL (ref 8–23)
BUN/CREAT SERPL: 26.3 (ref 7–25)
CALCIUM SPEC-SCNC: 8.5 MG/DL (ref 8.6–10.5)
CHLORIDE SERPL-SCNC: 97 MMOL/L (ref 98–107)
CO2 SERPL-SCNC: 28.5 MMOL/L (ref 22–29)
CREAT SERPL-MCNC: 0.57 MG/DL (ref 0.76–1.27)
DEPRECATED RDW RBC AUTO: 43.8 FL (ref 37–54)
EGFRCR SERPLBLD CKD-EPI 2021: 108.8 ML/MIN/1.73
ERYTHROCYTE [DISTWIDTH] IN BLOOD BY AUTOMATED COUNT: 13.7 % (ref 12.3–15.4)
GLOBULIN UR ELPH-MCNC: 3.2 GM/DL
GLUCOSE SERPL-MCNC: 131 MG/DL (ref 65–99)
HCT VFR BLD AUTO: 36.3 % (ref 37.5–51)
HGB BLD-MCNC: 11.3 G/DL (ref 13–17.7)
MAGNESIUM SERPL-MCNC: 1.9 MG/DL (ref 1.6–2.4)
MCH RBC QN AUTO: 27.1 PG (ref 26.6–33)
MCHC RBC AUTO-ENTMCNC: 31.1 G/DL (ref 31.5–35.7)
MCV RBC AUTO: 87.1 FL (ref 79–97)
PHOSPHATE SERPL-MCNC: 3.8 MG/DL (ref 2.5–4.5)
PLATELET # BLD AUTO: 240 10*3/MM3 (ref 140–450)
PMV BLD AUTO: 9.5 FL (ref 6–12)
POTASSIUM SERPL-SCNC: 4.1 MMOL/L (ref 3.5–5.2)
PROT SERPL-MCNC: 6.4 G/DL (ref 6–8.5)
RBC # BLD AUTO: 4.17 10*6/MM3 (ref 4.14–5.8)
SODIUM SERPL-SCNC: 134 MMOL/L (ref 136–145)
TSH SERPL DL<=0.05 MIU/L-ACNC: 2.53 UIU/ML (ref 0.27–4.2)
WBC NRBC COR # BLD: 9.32 10*3/MM3 (ref 3.4–10.8)

## 2023-10-30 PROCEDURE — 27245 TREAT THIGH FRACTURE: CPT | Performed by: ORTHOPAEDIC SURGERY

## 2023-10-30 PROCEDURE — 25010000002 CEFTRIAXONE PER 250 MG: Performed by: STUDENT IN AN ORGANIZED HEALTH CARE EDUCATION/TRAINING PROGRAM

## 2023-10-30 PROCEDURE — 0QS636Z REPOSITION RIGHT UPPER FEMUR WITH INTRAMEDULLARY INTERNAL FIXATION DEVICE, PERCUTANEOUS APPROACH: ICD-10-PCS | Performed by: ORTHOPAEDIC SURGERY

## 2023-10-30 PROCEDURE — 25010000002 HYDROMORPHONE 1 MG/ML SOLUTION: Performed by: STUDENT IN AN ORGANIZED HEALTH CARE EDUCATION/TRAINING PROGRAM

## 2023-10-30 PROCEDURE — C1713 ANCHOR/SCREW BN/BN,TIS/BN: HCPCS | Performed by: ORTHOPAEDIC SURGERY

## 2023-10-30 PROCEDURE — 25010000002 NALOXONE PER 1 MG: Performed by: NURSE ANESTHETIST, CERTIFIED REGISTERED

## 2023-10-30 PROCEDURE — 80053 COMPREHEN METABOLIC PANEL: CPT | Performed by: STUDENT IN AN ORGANIZED HEALTH CARE EDUCATION/TRAINING PROGRAM

## 2023-10-30 PROCEDURE — 0 HYDROMORPHONE 1 MG/ML SOLUTION: Performed by: NURSE ANESTHETIST, CERTIFIED REGISTERED

## 2023-10-30 PROCEDURE — 76000 FLUOROSCOPY <1 HR PHYS/QHP: CPT

## 2023-10-30 PROCEDURE — 25010000002 DEXAMETHASONE PER 1 MG: Performed by: NURSE ANESTHETIST, CERTIFIED REGISTERED

## 2023-10-30 PROCEDURE — 85027 COMPLETE CBC AUTOMATED: CPT | Performed by: STUDENT IN AN ORGANIZED HEALTH CARE EDUCATION/TRAINING PROGRAM

## 2023-10-30 PROCEDURE — 25810000003 SODIUM CHLORIDE 0.9 % SOLUTION: Performed by: STUDENT IN AN ORGANIZED HEALTH CARE EDUCATION/TRAINING PROGRAM

## 2023-10-30 PROCEDURE — 25010000002 ONDANSETRON PER 1 MG: Performed by: NURSE ANESTHETIST, CERTIFIED REGISTERED

## 2023-10-30 PROCEDURE — 94799 UNLISTED PULMONARY SVC/PX: CPT

## 2023-10-30 PROCEDURE — 25010000002 PROPOFOL 10 MG/ML EMULSION: Performed by: NURSE ANESTHETIST, CERTIFIED REGISTERED

## 2023-10-30 PROCEDURE — 25010000002 CEFAZOLIN IN DEXTROSE 2-4 GM/100ML-% SOLUTION: Performed by: ORTHOPAEDIC SURGERY

## 2023-10-30 PROCEDURE — C1769 GUIDE WIRE: HCPCS | Performed by: ORTHOPAEDIC SURGERY

## 2023-10-30 PROCEDURE — 94761 N-INVAS EAR/PLS OXIMETRY MLT: CPT

## 2023-10-30 PROCEDURE — 25010000002 FENTANYL CITRATE (PF) 50 MCG/ML SOLUTION: Performed by: NURSE ANESTHETIST, CERTIFIED REGISTERED

## 2023-10-30 PROCEDURE — 25010000002 SUGAMMADEX 200 MG/2ML SOLUTION: Performed by: NURSE ANESTHETIST, CERTIFIED REGISTERED

## 2023-10-30 PROCEDURE — 99222 1ST HOSP IP/OBS MODERATE 55: CPT | Performed by: ORTHOPAEDIC SURGERY

## 2023-10-30 PROCEDURE — 84100 ASSAY OF PHOSPHORUS: CPT | Performed by: STUDENT IN AN ORGANIZED HEALTH CARE EDUCATION/TRAINING PROGRAM

## 2023-10-30 PROCEDURE — 25810000003 LACTATED RINGERS PER 1000 ML: Performed by: ORTHOPAEDIC SURGERY

## 2023-10-30 PROCEDURE — 25010000002 HYDROMORPHONE 1 MG/ML SOLUTION: Performed by: NURSE ANESTHETIST, CERTIFIED REGISTERED

## 2023-10-30 PROCEDURE — 83735 ASSAY OF MAGNESIUM: CPT | Performed by: STUDENT IN AN ORGANIZED HEALTH CARE EDUCATION/TRAINING PROGRAM

## 2023-10-30 DEVICE — ZNN CMN LAG SCREW 10.5X105
Type: IMPLANTABLE DEVICE | Site: HIP | Status: FUNCTIONAL
Brand: ZIMMER® NATURAL NAIL® SYSTEM

## 2023-10-30 DEVICE — SCRW CORT FA FUL/THRD HEX3.5 TI 5X37.5MM: Type: IMPLANTABLE DEVICE | Site: HIP | Status: FUNCTIONAL

## 2023-10-30 DEVICE — ZNN CMN NAIL 13MMX21.5CM 125R
Type: IMPLANTABLE DEVICE | Site: HIP | Status: FUNCTIONAL
Brand: ZIMMER® NATURAL NAIL® SYSTEM

## 2023-10-30 RX ORDER — FENTANYL CITRATE 50 UG/ML
INJECTION, SOLUTION INTRAMUSCULAR; INTRAVENOUS AS NEEDED
Status: DISCONTINUED | OUTPATIENT
Start: 2023-10-30 | End: 2023-10-30 | Stop reason: SURG

## 2023-10-30 RX ORDER — PROMETHAZINE HYDROCHLORIDE 12.5 MG/1
12.5 SUPPOSITORY RECTAL EVERY 6 HOURS PRN
Status: DISCONTINUED | OUTPATIENT
Start: 2023-10-30 | End: 2023-11-02 | Stop reason: HOSPADM

## 2023-10-30 RX ORDER — HEPARIN SODIUM 5000 [USP'U]/ML
5000 INJECTION, SOLUTION INTRAVENOUS; SUBCUTANEOUS EVERY 8 HOURS SCHEDULED
Status: DISCONTINUED | OUTPATIENT
Start: 2023-10-30 | End: 2023-10-30

## 2023-10-30 RX ORDER — ONDANSETRON 4 MG/1
4 TABLET, FILM COATED ORAL EVERY 6 HOURS PRN
Status: DISCONTINUED | OUTPATIENT
Start: 2023-10-30 | End: 2023-11-02 | Stop reason: HOSPADM

## 2023-10-30 RX ORDER — PROMETHAZINE HYDROCHLORIDE 12.5 MG/1
25 TABLET ORAL ONCE AS NEEDED
Status: DISCONTINUED | OUTPATIENT
Start: 2023-10-30 | End: 2023-10-30 | Stop reason: HOSPADM

## 2023-10-30 RX ORDER — ROCURONIUM BROMIDE 10 MG/ML
INJECTION, SOLUTION INTRAVENOUS AS NEEDED
Status: DISCONTINUED | OUTPATIENT
Start: 2023-10-30 | End: 2023-10-30 | Stop reason: SURG

## 2023-10-30 RX ORDER — SODIUM CHLORIDE 0.9 % (FLUSH) 0.9 %
10 SYRINGE (ML) INJECTION AS NEEDED
Status: DISCONTINUED | OUTPATIENT
Start: 2023-10-30 | End: 2023-11-02 | Stop reason: HOSPADM

## 2023-10-30 RX ORDER — PROMETHAZINE HYDROCHLORIDE 25 MG/1
25 SUPPOSITORY RECTAL ONCE AS NEEDED
Status: DISCONTINUED | OUTPATIENT
Start: 2023-10-30 | End: 2023-10-30 | Stop reason: HOSPADM

## 2023-10-30 RX ORDER — MAGNESIUM HYDROXIDE 1200 MG/15ML
LIQUID ORAL AS NEEDED
Status: DISCONTINUED | OUTPATIENT
Start: 2023-10-30 | End: 2023-10-30 | Stop reason: HOSPADM

## 2023-10-30 RX ORDER — SODIUM CHLORIDE 9 MG/ML
40 INJECTION, SOLUTION INTRAVENOUS AS NEEDED
Status: DISCONTINUED | OUTPATIENT
Start: 2023-10-30 | End: 2023-11-02 | Stop reason: HOSPADM

## 2023-10-30 RX ORDER — MEPERIDINE HYDROCHLORIDE 25 MG/ML
12.5 INJECTION INTRAMUSCULAR; INTRAVENOUS; SUBCUTANEOUS
Status: DISCONTINUED | OUTPATIENT
Start: 2023-10-30 | End: 2023-10-30 | Stop reason: HOSPADM

## 2023-10-30 RX ORDER — ACETAMINOPHEN 325 MG/1
325 TABLET ORAL EVERY 4 HOURS PRN
Status: DISCONTINUED | OUTPATIENT
Start: 2023-10-30 | End: 2023-10-30 | Stop reason: SDUPTHER

## 2023-10-30 RX ORDER — LIOTHYRONINE SODIUM 5 UG/1
10 TABLET ORAL
Status: DISCONTINUED | OUTPATIENT
Start: 2023-10-30 | End: 2023-11-02 | Stop reason: HOSPADM

## 2023-10-30 RX ORDER — BISACODYL 10 MG
10 SUPPOSITORY, RECTAL RECTAL DAILY PRN
Status: DISCONTINUED | OUTPATIENT
Start: 2023-10-30 | End: 2023-10-30 | Stop reason: SDUPTHER

## 2023-10-30 RX ORDER — HYDROCODONE BITARTRATE AND ACETAMINOPHEN 7.5; 325 MG/1; MG/1
1 TABLET ORAL EVERY 4 HOURS PRN
Status: DISCONTINUED | OUTPATIENT
Start: 2023-10-30 | End: 2023-11-02 | Stop reason: HOSPADM

## 2023-10-30 RX ORDER — BISACODYL 10 MG
10 SUPPOSITORY, RECTAL RECTAL DAILY PRN
Status: DISCONTINUED | OUTPATIENT
Start: 2023-10-30 | End: 2023-11-02 | Stop reason: HOSPADM

## 2023-10-30 RX ORDER — PROMETHAZINE HYDROCHLORIDE 12.5 MG/1
12.5 TABLET ORAL EVERY 6 HOURS PRN
Status: DISCONTINUED | OUTPATIENT
Start: 2023-10-30 | End: 2023-11-02 | Stop reason: HOSPADM

## 2023-10-30 RX ORDER — EPHEDRINE SULFATE 50 MG/ML
INJECTION, SOLUTION INTRAVENOUS AS NEEDED
Status: DISCONTINUED | OUTPATIENT
Start: 2023-10-30 | End: 2023-10-30 | Stop reason: SURG

## 2023-10-30 RX ORDER — NALOXONE HYDROCHLORIDE 0.4 MG/ML
INJECTION, SOLUTION INTRAMUSCULAR; INTRAVENOUS; SUBCUTANEOUS AS NEEDED
Status: DISCONTINUED | OUTPATIENT
Start: 2023-10-30 | End: 2023-10-30 | Stop reason: SURG

## 2023-10-30 RX ORDER — LEVOTHYROXINE SODIUM 137 UG/1
274 TABLET ORAL
Status: DISCONTINUED | OUTPATIENT
Start: 2023-10-30 | End: 2023-11-02 | Stop reason: HOSPADM

## 2023-10-30 RX ORDER — SODIUM CHLORIDE 0.9 % (FLUSH) 0.9 %
10 SYRINGE (ML) INJECTION EVERY 12 HOURS SCHEDULED
Status: DISCONTINUED | OUTPATIENT
Start: 2023-10-30 | End: 2023-11-02 | Stop reason: HOSPADM

## 2023-10-30 RX ORDER — ONDANSETRON 2 MG/ML
4 INJECTION INTRAMUSCULAR; INTRAVENOUS ONCE AS NEEDED
Status: DISCONTINUED | OUTPATIENT
Start: 2023-10-30 | End: 2023-10-30 | Stop reason: HOSPADM

## 2023-10-30 RX ORDER — OXYCODONE HYDROCHLORIDE 5 MG/1
5 TABLET ORAL EVERY 6 HOURS PRN
Status: DISCONTINUED | OUTPATIENT
Start: 2023-10-30 | End: 2023-11-02 | Stop reason: HOSPADM

## 2023-10-30 RX ORDER — ACETAMINOPHEN 500 MG
1000 TABLET ORAL EVERY 8 HOURS
Status: DISPENSED | OUTPATIENT
Start: 2023-10-30 | End: 2023-11-01

## 2023-10-30 RX ORDER — ACETAMINOPHEN 500 MG
1000 TABLET ORAL EVERY 8 HOURS PRN
Status: DISCONTINUED | OUTPATIENT
Start: 2023-10-30 | End: 2023-11-02 | Stop reason: HOSPADM

## 2023-10-30 RX ORDER — FAMOTIDINE 20 MG/1
40 TABLET, FILM COATED ORAL DAILY
Status: DISCONTINUED | OUTPATIENT
Start: 2023-10-30 | End: 2023-11-02 | Stop reason: HOSPADM

## 2023-10-30 RX ORDER — SODIUM CHLORIDE, SODIUM LACTATE, POTASSIUM CHLORIDE, CALCIUM CHLORIDE 600; 310; 30; 20 MG/100ML; MG/100ML; MG/100ML; MG/100ML
100 INJECTION, SOLUTION INTRAVENOUS CONTINUOUS
Status: DISCONTINUED | OUTPATIENT
Start: 2023-10-30 | End: 2023-10-31

## 2023-10-30 RX ORDER — POLYETHYLENE GLYCOL 3350 17 G/17G
17 POWDER, FOR SOLUTION ORAL DAILY PRN
Status: DISCONTINUED | OUTPATIENT
Start: 2023-10-30 | End: 2023-11-02 | Stop reason: HOSPADM

## 2023-10-30 RX ORDER — BISACODYL 5 MG/1
5 TABLET, DELAYED RELEASE ORAL DAILY PRN
Status: DISCONTINUED | OUTPATIENT
Start: 2023-10-30 | End: 2023-11-02 | Stop reason: HOSPADM

## 2023-10-30 RX ORDER — CEFAZOLIN SODIUM 2 G/100ML
2000 INJECTION, SOLUTION INTRAVENOUS ONCE
Status: DISCONTINUED | OUTPATIENT
Start: 2023-10-30 | End: 2023-10-30

## 2023-10-30 RX ORDER — SODIUM CHLORIDE 9 MG/ML
75 INJECTION, SOLUTION INTRAVENOUS CONTINUOUS
Status: ACTIVE | OUTPATIENT
Start: 2023-10-30 | End: 2023-10-31

## 2023-10-30 RX ORDER — ONDANSETRON 2 MG/ML
4 INJECTION INTRAMUSCULAR; INTRAVENOUS EVERY 6 HOURS PRN
Status: DISCONTINUED | OUTPATIENT
Start: 2023-10-30 | End: 2023-11-02 | Stop reason: HOSPADM

## 2023-10-30 RX ORDER — ONDANSETRON 2 MG/ML
INJECTION INTRAMUSCULAR; INTRAVENOUS AS NEEDED
Status: DISCONTINUED | OUTPATIENT
Start: 2023-10-30 | End: 2023-10-30 | Stop reason: SURG

## 2023-10-30 RX ORDER — FUROSEMIDE 20 MG/1
20 TABLET ORAL EVERY OTHER DAY
COMMUNITY

## 2023-10-30 RX ORDER — PROPOFOL 10 MG/ML
VIAL (ML) INTRAVENOUS AS NEEDED
Status: DISCONTINUED | OUTPATIENT
Start: 2023-10-30 | End: 2023-10-30 | Stop reason: SURG

## 2023-10-30 RX ORDER — LIDOCAINE HYDROCHLORIDE 20 MG/ML
INJECTION, SOLUTION EPIDURAL; INFILTRATION; INTRACAUDAL; PERINEURAL AS NEEDED
Status: DISCONTINUED | OUTPATIENT
Start: 2023-10-30 | End: 2023-10-30 | Stop reason: SURG

## 2023-10-30 RX ORDER — NALOXONE HCL 0.4 MG/ML
0.4 VIAL (ML) INJECTION
Status: DISCONTINUED | OUTPATIENT
Start: 2023-10-30 | End: 2023-11-02 | Stop reason: HOSPADM

## 2023-10-30 RX ORDER — CEFAZOLIN SODIUM 2 G/100ML
2000 INJECTION, SOLUTION INTRAVENOUS EVERY 8 HOURS
Status: COMPLETED | OUTPATIENT
Start: 2023-10-30 | End: 2023-10-31

## 2023-10-30 RX ORDER — FERROUS SULFATE 325(65) MG
325 TABLET ORAL
Status: DISCONTINUED | OUTPATIENT
Start: 2023-10-31 | End: 2023-11-02 | Stop reason: HOSPADM

## 2023-10-30 RX ORDER — CEFAZOLIN SODIUM 2 G/100ML
2000 INJECTION, SOLUTION INTRAVENOUS ONCE
Status: DISCONTINUED | OUTPATIENT
Start: 2023-10-30 | End: 2023-10-30 | Stop reason: HOSPADM

## 2023-10-30 RX ORDER — DEXAMETHASONE SODIUM PHOSPHATE 4 MG/ML
INJECTION, SOLUTION INTRA-ARTICULAR; INTRALESIONAL; INTRAMUSCULAR; INTRAVENOUS; SOFT TISSUE AS NEEDED
Status: DISCONTINUED | OUTPATIENT
Start: 2023-10-30 | End: 2023-10-30 | Stop reason: SURG

## 2023-10-30 RX ORDER — CEFTRIAXONE SODIUM 1 G/50ML
1000 INJECTION, SOLUTION INTRAVENOUS EVERY 24 HOURS
Status: DISCONTINUED | OUTPATIENT
Start: 2023-10-30 | End: 2023-10-30

## 2023-10-30 RX ORDER — ENOXAPARIN SODIUM 100 MG/ML
40 INJECTION SUBCUTANEOUS DAILY
Status: DISCONTINUED | OUTPATIENT
Start: 2023-10-31 | End: 2023-11-02 | Stop reason: HOSPADM

## 2023-10-30 RX ORDER — ASPIRIN 81 MG/1
81 TABLET ORAL DAILY
Status: DISCONTINUED | OUTPATIENT
Start: 2023-10-30 | End: 2023-10-30

## 2023-10-30 RX ORDER — NICOTINE 21 MG/24HR
1 PATCH, TRANSDERMAL 24 HOURS TRANSDERMAL EVERY 24 HOURS
Status: DISCONTINUED | OUTPATIENT
Start: 2023-10-30 | End: 2023-11-02 | Stop reason: HOSPADM

## 2023-10-30 RX ORDER — AMOXICILLIN 250 MG
1 CAPSULE ORAL NIGHTLY
Status: DISCONTINUED | OUTPATIENT
Start: 2023-10-30 | End: 2023-11-02 | Stop reason: HOSPADM

## 2023-10-30 RX ORDER — HYDROCODONE BITARTRATE AND ACETAMINOPHEN 7.5; 325 MG/1; MG/1
2 TABLET ORAL EVERY 4 HOURS PRN
Status: DISCONTINUED | OUTPATIENT
Start: 2023-10-30 | End: 2023-11-02 | Stop reason: HOSPADM

## 2023-10-30 RX ORDER — METOPROLOL SUCCINATE 25 MG/1
25 TABLET, EXTENDED RELEASE ORAL DAILY
Status: DISCONTINUED | OUTPATIENT
Start: 2023-10-30 | End: 2023-11-02 | Stop reason: HOSPADM

## 2023-10-30 RX ORDER — PRAVASTATIN SODIUM 20 MG
40 TABLET ORAL NIGHTLY
Status: DISCONTINUED | OUTPATIENT
Start: 2023-10-30 | End: 2023-10-30

## 2023-10-30 RX ORDER — OXYCODONE HYDROCHLORIDE 5 MG/1
5 TABLET ORAL
Status: DISCONTINUED | OUTPATIENT
Start: 2023-10-30 | End: 2023-10-30 | Stop reason: HOSPADM

## 2023-10-30 RX ADMIN — NALOXONE HYDROCHLORIDE 0.04 MG: 0.4 INJECTION, SOLUTION INTRAMUSCULAR; INTRAVENOUS; SUBCUTANEOUS at 17:06

## 2023-10-30 RX ADMIN — NALOXONE HYDROCHLORIDE 0.04 MG: 0.4 INJECTION, SOLUTION INTRAMUSCULAR; INTRAVENOUS; SUBCUTANEOUS at 17:09

## 2023-10-30 RX ADMIN — SODIUM CHLORIDE: 9 INJECTION, SOLUTION INTRAVENOUS at 17:22

## 2023-10-30 RX ADMIN — PROPOFOL 200 MG: 10 INJECTION, EMULSION INTRAVENOUS at 15:52

## 2023-10-30 RX ADMIN — HYDROMORPHONE HYDROCHLORIDE 0.5 MG: 1 INJECTION, SOLUTION INTRAMUSCULAR; INTRAVENOUS; SUBCUTANEOUS at 17:34

## 2023-10-30 RX ADMIN — LIDOCAINE HYDROCHLORIDE 100 MG: 20 INJECTION, SOLUTION EPIDURAL; INFILTRATION; INTRACAUDAL; PERINEURAL at 15:50

## 2023-10-30 RX ADMIN — HYDROMORPHONE HYDROCHLORIDE 0.5 MG: 1 INJECTION, SOLUTION INTRAMUSCULAR; INTRAVENOUS; SUBCUTANEOUS at 16:13

## 2023-10-30 RX ADMIN — Medication 10 ML: at 20:19

## 2023-10-30 RX ADMIN — NICOTINE 1 PATCH: 21 PATCH, EXTENDED RELEASE TRANSDERMAL at 20:18

## 2023-10-30 RX ADMIN — DEXAMETHASONE SODIUM PHOSPHATE 4 MG: 4 INJECTION, SOLUTION INTRAMUSCULAR; INTRAVENOUS at 16:02

## 2023-10-30 RX ADMIN — SUGAMMADEX 200 MG: 100 INJECTION, SOLUTION INTRAVENOUS at 16:48

## 2023-10-30 RX ADMIN — HYDROMORPHONE HYDROCHLORIDE 0.5 MG: 1 INJECTION, SOLUTION INTRAMUSCULAR; INTRAVENOUS; SUBCUTANEOUS at 17:43

## 2023-10-30 RX ADMIN — CEFTRIAXONE SODIUM 1000 MG: 1 INJECTION, SOLUTION INTRAVENOUS at 01:11

## 2023-10-30 RX ADMIN — EPHEDRINE SULFATE 10 MG: 50 INJECTION INTRAVENOUS at 16:01

## 2023-10-30 RX ADMIN — LEVOTHYROXINE SODIUM 274 MCG: 137 TABLET ORAL at 05:55

## 2023-10-30 RX ADMIN — OXYCODONE HYDROCHLORIDE 5 MG: 5 TABLET ORAL at 08:34

## 2023-10-30 RX ADMIN — Medication 10 ML: at 08:34

## 2023-10-30 RX ADMIN — EPHEDRINE SULFATE 10 MG: 50 INJECTION INTRAVENOUS at 16:02

## 2023-10-30 RX ADMIN — HYDROCODONE BITARTRATE AND ACETAMINOPHEN 2 TABLET: 7.5; 325 TABLET ORAL at 20:23

## 2023-10-30 RX ADMIN — ONDANSETRON 4 MG: 2 INJECTION INTRAMUSCULAR; INTRAVENOUS at 16:11

## 2023-10-30 RX ADMIN — SODIUM CHLORIDE, POTASSIUM CHLORIDE, SODIUM LACTATE AND CALCIUM CHLORIDE 100 ML/HR: 600; 310; 30; 20 INJECTION, SOLUTION INTRAVENOUS at 20:16

## 2023-10-30 RX ADMIN — NALOXONE HYDROCHLORIDE 0.04 MG: 0.4 INJECTION, SOLUTION INTRAMUSCULAR; INTRAVENOUS; SUBCUTANEOUS at 17:11

## 2023-10-30 RX ADMIN — METOPROLOL SUCCINATE 25 MG: 25 TABLET, EXTENDED RELEASE ORAL at 08:34

## 2023-10-30 RX ADMIN — CEFAZOLIN SODIUM 2000 MG: 2 INJECTION, SOLUTION INTRAVENOUS at 20:17

## 2023-10-30 RX ADMIN — OXYCODONE HYDROCHLORIDE 5 MG: 5 TABLET ORAL at 01:11

## 2023-10-30 RX ADMIN — DOCUSATE SODIUM 50MG AND SENNOSIDES 8.6MG 1 TABLET: 8.6; 5 TABLET, FILM COATED ORAL at 01:11

## 2023-10-30 RX ADMIN — ROCURONIUM BROMIDE 50 MG: 50 INJECTION INTRAVENOUS at 15:52

## 2023-10-30 RX ADMIN — FAMOTIDINE 40 MG: 20 TABLET, FILM COATED ORAL at 20:15

## 2023-10-30 RX ADMIN — HYDROMORPHONE HYDROCHLORIDE 1 MG: 1 INJECTION, SOLUTION INTRAMUSCULAR; INTRAVENOUS; SUBCUTANEOUS at 03:30

## 2023-10-30 RX ADMIN — FENTANYL CITRATE 50 MCG: 50 INJECTION, SOLUTION INTRAMUSCULAR; INTRAVENOUS at 15:50

## 2023-10-30 RX ADMIN — LIOTHYRONINE SODIUM 10 MCG: 5 TABLET ORAL at 05:55

## 2023-10-30 RX ADMIN — Medication 10 ML: at 01:26

## 2023-10-30 RX ADMIN — SODIUM CHLORIDE 75 ML/HR: 9 INJECTION, SOLUTION INTRAVENOUS at 01:11
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
## 2023-10-30 NOTE — H&P (VIEW-ONLY)
[facility]   Consult Note    Patient Name: Colton Chiang  : 1960  MRN: [id number]  Primary Care Physician:  Eduarda Butt DO  Referring Physician: No ref. provider found  Date of admission: 10/29/2023    Inpatient Orthopedic Surgery Consult  Consult performed by: Sandra Wolfe MD  Consult ordered by: Mele Coronado MD        Subjective   Subjective     Reason for Consult/ Chief Complaint: Right hip pain after a fall    History of Present Illness  Colton Chiang is a 63 y.o. male who broke his hip when he fell at [employer].  States he just slipped.  Fell on his right side.  Denies any lightheadedness or reason for a fall.  Denies hurting anything else.  Hip pain was severe and was brought to [facility] where x-rays revealed an intertrochanteric hip fracture.    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     Social History: He  reports that he has been smoking cigarettes. He started smoking about 51 years ago. He has a 90.00 pack-year smoking history. He has never used smokeless tobacco. He reports that he does not drink alcohol and does not use drugs.    Home Medications:  Co Q-10, Magnesium, Vitamin C, Vitamin D3, Zinc, aspirin, doxycycline, ferrous sulfate, furosemide, levothyroxine, liothyronine, metoprolol succinate XL, multivitamin with minerals,  and pravastatin    Allergies:  He is allergic to penicillin g and penicillins.    Objective    Objective     Vitals:    Temp:  [97.6 °F (36.4 °C)-98.3 °F (36.8 °C)] 97.6 °F (36.4 °C)  Heart Rate:  [65-68] 68  Resp:  [18-24] 24  BP: (124-129)/(56-72) 129/56  Flow (L/min):  [2] 2    Physical Exam  Awake and alert.  Pain in right hip.  Hips held externally rotated and shortened.  Able to wiggle his toes.  Sensations intact.  No left hip pain.  Denies any upper extremity injury.  Result Review    Result Review:  I have personally reviewed the results from the time of this admission to 10/30/2023 06:44 EDT and agree with these findings:  [x]  Laboratory list / accordion  []  Microbiology  [x]  Radiology  []  EKG/Telemetry   []  Cardiology/Vascular   []  Pathology  []  Old records  []  Other:  Most notable findings include: Right intertrochanteric hip fracture      Assessment & Plan   Assessment / Plan     Brief Patient Summary:  Colton Chiang is a 63 y.o. male who fell and sustained a right hip fracture.  He has an intertrochanteric hip fracture    Active Hospital Problems:  Active Hospital Problems    Diagnosis     **Fall     Closed intertrochanteric fracture of hip, right, initial encounter        Plan:   Risk and benefits of surgery were explained.  Patient understands and wishes to proceed.  NPO.  Patient was marked.    Sandra Wolfe MD

## 2023-10-30 NOTE — NURSING NOTE
Spoke with Dr Coronado and requested pain medication for pt to receive after gets CHG bath as pt already received PO Oxycodone. MD stated would place order for Dilaudid 1mg IV for one time dose. Pt informed and stated understanding.

## 2023-10-30 NOTE — OP NOTE
HIP TROCHANTERIC NAILING WITH INTRAMEDULLARY HIP SCREW  Procedure Report    Patient Name:  Colton Chiang  YOB: 1960    Date of Surgery:  10/30/2023     Indications:  Injury to hip-->IT hip fracture    Pre-op Diagnosis:   Closed intertrochanteric fracture of hip, right, initial encounter [S72.141A]       Post-Op Diagnosis Codes:     * Closed intertrochanteric fracture of hip, right, initial encounter [S72.141A]    Procedure/CPT® Codes:      Procedure(s):  HIP TROCHANTERIC NAILING WITH INTRAMEDULLARY HIP SCREW    Staff:  Surgeon(s):  Sandra Wolfe MD    Assistant: Sol Ogden; Natividad Marrufo Christopher, RN    Anesthesia: General    Estimated Blood Loss: 75      Implants:    Implant Name Type Inv. Item Serial No.  Lot No. LRB No. Used Action   NAIL IM/FEM CEPH TI 13MM 21.5CM SHT RT STRL - [id number] Implant NAIL IM/FEM CEPH TI 13MM 21.5CM SHT RT STRL  [employer] [id number] Right 1 Implanted   SCRW LAG CEPH TI 10.6B340WX - [id number] Implant SCRW LAG CEPH TI 10.5H999NW  [employer] [id number] Right 1 Implanted   SCRW WILFRIDO FA FUL/THRD HEX3.5 TI 5X37.5MM - [id number] Implant SCRW WILFRIDO FA FUL/THRD HEX3.5 TI 5X37.5MM  [employer] [id number] Right 1 Implanted       Specimen:          None        Findings: IT hip fx    Complications: None    Description of Procedure: The patient was brought to the operating room and underwent anesthesia. The patient was then placed on the [place] table where a good reduction was achieved. The patient was then prepped and draped in sterile fashion after receiving preoperative antibiotic. Proximal incision was made. The fascia was split. The K-wire was then placed across the greater trochanter. C-arm fluoroscopy was used throughout the case. This was then opened with a reamer. The ball tip guide-wire was inserted followed by placement of the nail. K-wire was then placed into the center of the femoral head in both AP and lateral planes. This was then  drilled, and the appropriate length screw was then inserted. Set screw was then tightened. The triple trocars were then used for the distal screw. This was done by drill, depth gauge and placement of appropriate length screw. C-arm fluoroscopy was used throughout the case. Good reduction and fixation was achieved. This was then thoroughly irrigated. Closed using #1 Vicryl, 2-0 Vicryl and staples. Sterile dressing was applied. The patient was then awakened from anesthesia and taken to the recovery room in stable condition. There were no complications.    Assistant: Sol Ogden; Natividad Marrufo Christopher, CHRISTIAN  was responsible for performing the following activities: Retraction, Suction, Irrigation, Closing, and Placing Dressing and their skilled assistance was necessary for the success of this case.    Sandra Wolfe MD     Date: 10/30/2023  Time: 16:49 EDT

## 2023-10-30 NOTE — CONSULTS
[facility]   Consult Note    Patient Name: Colton Chiang  : 1960  MRN: [id number]  Primary Care Physician:  Eduarda Butt DO  Referring Physician: No ref. provider found  Date of admission: 10/29/2023    Inpatient Orthopedic Surgery Consult  Consult performed by: Sandra Wolfe MD  Consult ordered by: Mele Coronado MD        Subjective   Subjective     Reason for Consult/ Chief Complaint: Right hip pain after a fall    History of Present Illness  Colton Chiang is a 63 y.o. male who broke his hip when he fell at [employer].  States he just slipped.  Fell on his right side.  Denies any lightheadedness or reason for a fall.  Denies hurting anything else.  Hip pain was severe and was brought to [facility] where x-rays revealed an intertrochanteric hip fracture.    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     Social History: He  reports that he has been smoking cigarettes. He started smoking about 51 years ago. He has a 90.00 pack-year smoking history. He has never used smokeless tobacco. He reports that he does not drink alcohol and does not use drugs.    Home Medications:  Co Q-10, Magnesium, Vitamin C, Vitamin D3, Zinc, aspirin, doxycycline, ferrous sulfate, furosemide, levothyroxine, liothyronine, metoprolol succinate XL, multivitamin with minerals,  and pravastatin    Allergies:  He is allergic to penicillin g and penicillins.    Objective    Objective     Vitals:    Temp:  [97.6 °F (36.4 °C)-98.3 °F (36.8 °C)] 97.6 °F (36.4 °C)  Heart Rate:  [65-68] 68  Resp:  [18-24] 24  BP: (124-129)/(56-72) 129/56  Flow (L/min):  [2] 2    Physical Exam  Awake and alert.  Pain in right hip.  Hips held externally rotated and shortened.  Able to wiggle his toes.  Sensations intact.  No left hip pain.  Denies any upper extremity injury.  Result Review    Result Review:  I have personally reviewed the results from the time of this admission to 10/30/2023 06:44 EDT and agree with these findings:  [x]  Laboratory list / accordion  []  Microbiology  [x]  Radiology  []  EKG/Telemetry   []  Cardiology/Vascular   []  Pathology  []  Old records  []  Other:  Most notable findings include: Right intertrochanteric hip fracture      Assessment & Plan   Assessment / Plan     Brief Patient Summary:  Colton Chiang is a 63 y.o. male who fell and sustained a right hip fracture.  He has an intertrochanteric hip fracture    Active Hospital Problems:  Active Hospital Problems    Diagnosis     **Fall     Closed intertrochanteric fracture of hip, right, initial encounter        Plan:   Risk and benefits of surgery were explained.  Patient understands and wishes to proceed.  NPO.  Patient was marked.    Sandra Wolfe MD

## 2023-10-30 NOTE — ED PROVIDER NOTES
Time: 10:50 PM EDT  Date of encounter:  10/29/2023  Independent Historian/Clinical History and Information was obtained by:   Patient  Chief Complaint: Right hip injury    History is limited by: N/A    History of Present Illness:  Patient is a 63 y.o. year old male who presents to the emergency department for evaluation of possible right hip injury.  Patient injured himself at [employer].  Patient fell on tile/porcelain surface.  Patient complains of severe pain in his right hip.  Patient is unable to stand or weight-bear because of pain.  Patient denies any other trauma or injury.    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, bilateral     Stroke     Thyroid disorder      Past Surgical History:   Procedure Laterality Date    COLONOSCOPY      HIP TROCHANTERIC NAILING WITH INTRAMEDULLARY HIP SCREW Right 10/30/2023    Procedure: HIP TROCHANTERIC NAILING WITH INTRAMEDULLARY HIP SCREW;  Surgeon: Sandra Wolfe MD;  Location: [facility];  Service: Orthopedics;  Laterality: Right;    PROSTATE BIOPSY       Family History   Problem Relation Age of Onset    Breast cancer Sister     Heart disease Other     Diabetes Other         Unspecified       Home Medications:  Prior to Admission medications    Medication Sig Start Date End Date Taking? Authorizing Provider   Ascorbic Acid (Vitamin C) 500 MG capsule Take  by mouth.    Provider, MD Juan Luis   Aspirin Low Dose 81 MG EC tablet Take 1 tablet by  mouth Daily. 8/12/22   Eduarda Butt DO   Cholecalciferol (Vitamin D3) 50 MCG (2000 UT) capsule TAKE 1 CAPSULE BY MOUTH EVERY DAY 1/30/23   Eduarda Butt DO   Coenzyme Q10 (Co Q-10) 300 MG capsule Take 1 capsule by mouth Every Other Day. 8/21/23   Eduarda Butt DO   doxycycline (VIBRAMYCIN) 100 MG capsule Take 1 capsule by mouth 2 (Two) Times a Day. 9/21/23   Eduarda Butt DO   FeroSul 325 (65 Fe) MG tablet TAKE 1 TABLET BY MOUTH EVERY DAY 2/13/23   Eduarda Butt DO   furosemide (LASIX) 40 MG tablet Take a half a tab 1 day and a whole tab the next day.  Can take an extra half on a as needed basis 7/26/22   Camilla Dang APRN   levothyroxine (SYNTHROID, LEVOTHROID) 137 MCG tablet Take 2 tablets in the am 30 minutes before breakfast. 3/31/23   Eduarda Butt DO   liothyronine (CYTOMEL) 5 MCG tablet TAKE 2 TABLETS DAILY 9/27/23   Eduarda Butt DO   Magnesium 250 MG tablet Take  by mouth.    ProviderJuan Luis MD   metoprolol succinate XL (TOPROL-XL) 25 MG 24 hr tablet Take 1/2 tab po qd 10/13/23   Colin Concepcion MD   multivitamin with minerals tablet tablet Take 1 tablet by mouth Daily.    Juan Luis Davis MD   pravastatin (Pravachol) 40 MG tablet Take 1 tablet by mouth Daily. 8/21/23   Eduarda Butt DO   Zinc 50 MG capsule Take  by mouth.    ProviderJuan Luis MD        Social History:   Social History     Tobacco Use    Smoking status: Every Day     Packs/day: 2.00     Years: 45.00     Additional pack years: 0.00     Total pack years: 90.00     Types: Cigarettes     Start date: 1/1/1972    Smokeless tobacco: Never   Vaping Use    Vaping Use: Never used   Substance Use Topics    Alcohol use: Never    Drug use: Never         Review of Systems:  Review of Systems   Constitutional:  Negative for chills and fever.   HENT:  Negative for congestion, ear pain and sore throat.    Eyes:  Negative for pain.   Respiratory:  Negative for cough, chest tightness and shortness of breath.   "  Cardiovascular:  Negative for chest pain.   Gastrointestinal:  Negative for abdominal pain, diarrhea, nausea and vomiting.   Genitourinary:  Negative for flank pain and hematuria.   Musculoskeletal:  Positive for arthralgias (Right hip pain). Negative for joint swelling.   Skin:  Negative for pallor.   Neurological:  Negative for seizures and headaches.   All other systems reviewed and are negative.      Physical Exam:  /67 (BP Location: Left arm, Patient Position: Sitting)   Pulse 72   Temp 98.4 °F (36.9 °C) (Oral)   Resp 16   Ht 180.3 cm (71\")   Wt 104 kg (229 lb 8 oz)   SpO2 (!) 88% Comment: room air  BMI 32.01 kg/m²     Physical Exam    Vital signs were reviewed under triage note.  General appearance - Patient appears well-developed and well-nourished.  Patient is in no acute distress.  Head - Normocephalic, atraumatic.  Pupils - Equal, round, reactive to light.  Extraocular muscles are intact.  Conjunctiva is clear.  Nasal - Normal inspection.  No evidence of trauma or epistaxis.  Tympanic membranes - Gray, intact without erythema or retractions.  Oral mucosa - Pink and moist without lesions or erythema.  Uvula is midline.  Chest wall - Atraumatic.  Chest wall is nontender.  There are no vesicular rashes noted.  Neck - Supple.  Trachea was midline.  There is no palpable lymphadenopathy or thyromegaly.  There are no meningeal signs  Lungs - Auscultation of the lungs had coarse breath sounds throughout with scattered expiratory wheezes.  Heart - Regular rate and rhythm without any murmurs, clicks, or gallops.  Abdomen - Soft.  Bowel sounds are present.  There is no palpable tenderness.  There is no rebound, guarding, or rigidity.  There are no palpable masses.  There are no pulsatile masses.  Back - Spine is straight and midline.  There is no CVA tenderness.  Extremities - Intact x4.  There is marked tenderness to palpation of the right hip.  Patient has severe pain with minimal logrolling of the " right leg.  The right leg is also slightly shortened and externally rotated.  There is no palpable edema.  Pulses are intact x4 and equal.  Neurologic - Patient is awake, alert, and oriented x3.  Cranial nerves II through XII are grossly intact.  Motor and sensory functions grossly intact.  Cerebellar function was normal.  Integument - There an area of erythema on the patient's right lower leg just above the ankle.  There are some slight warmth to this.  There is no skin breakdown.  There are no petechia or purpura lesions noted.  There are no vesicular lesions noted.          Procedures:  Procedures      Medical Decision Making:      Comorbidities that affect care:    Tobacco use disorder, arthritis, CHF, depression, hypertension, hypercholesterolemia, hypothyroidism, prostate cancer, stroke    External Notes reviewed:    EMS Run sheet: EMS run sheet was reviewed by me.  Patient with obvious right hip injury.      The following orders were placed and all results were independently analyzed by me:  Orders Placed This Encounter   Procedures    XR Hip With or Without Pelvis 2 - 3 View Right    FL Surgery Fluoro    Comprehensive Metabolic Panel    aPTT    Lipase    CBC Auto Differential    TSH Rfx On Abnormal To Free T4    CBC (No Diff)    Comprehensive Metabolic Panel    Magnesium    Phosphorus    Basic Metabolic Panel    Hemoglobin & Hematocrit, Blood    Diet: Regular/House Diet; Texture: Regular Texture (IDDSI 7); Fluid Consistency: Thin (IDDSI 0)    Vital Signs    Notify Provider (With Default Parameters)    Activity - Ad Maritza    Intake & Output    Weigh Patient    Oral Care    Opioid Administration - Document EtCO2 and / or SpO2 With Each Set of Vitals & Any Change in Patient Status    Opioid Administration - Notify Provider Hypercapnic Monitoring    Opioid Administration - Document EtCO2 and / or SpO2 With Each Set of Vitals & Any Change in Patient Status    Opioid Administration - Notify Provider Hypercapnic  Monitoring    Obtain Informed Consent    Place Sequential Compression Device    Maintain Sequential Compression Device    Skin Care    Vital Signs    Vital Signs    Neurovascular Checks    Neurovascular Checks    Intake and Output    Weight Bearing - As Tolerated    Turn, Cough & Deep Breathe Every 2 Hours Until Ambulating    Ice to Incision for 48 Hours    Apply Ice to Incision PRN for Edema, After Activity or Exercise, and to Lessen Discomfort    Pillows May Be Used to Elevate Operative Leg    Instruct Patient to Do At Least 10 Ankle Pumps Per Hour While Awake    May Stand to Void or Use Bedside Commode Day of Surgery. POD 1 & Thereafter Use Bedside Commode or Bathroom    Change Dressing    Hemovac Drain to Self Suction    Continuous Pulse Oximetry    Advance Diet As Tolerated -    Saline Lock & Maintain IV Access    Code Status and Medical Interventions:    Hospitalist (on-call MD unless specified)    Orthopedics (on-call MD unless specified)    Inpatient Case Management  Consult    OT Consult: Eval & Treat    PT Consult: Eval & Treat Functional Mobility Below Baseline    PT Consult: Eval & Treat    Opioid Administration - Capnography    Incentive Spirometry    Incentive Spirometry    Oxygen Therapy- Nasal Cannula; Titrate 1-6 LPM Per SpO2; 90 - 95%    Wound Ostomy Eval & Treat    Insert Peripheral IV    Insert Peripheral IV    Saline Lock IV With Adequate PO Intake    Insert Peripheral IV    Inpatient Admission    CBC & Differential       Medications Given in the Emergency Department:  Medications   sodium chloride 0.9 % flush 10 mL (has no administration in time range)   levothyroxine (SYNTHROID, LEVOTHROID) tablet 274 mcg (274 mcg Oral Given 10/31/23 0540)   liothyronine (CYTOMEL) tablet 10 mcg (10 mcg Oral Given 10/31/23 0540)   metoprolol succinate XL (TOPROL-XL) 24 hr tablet 25 mg (25 mg Oral Given 10/31/23 0906)   sodium chloride 0.9 % flush 10 mL (10 mL Intravenous Given 10/31/23 0907)    sodium chloride 0.9 % flush 10 mL (has no administration in time range)   sodium chloride 0.9 % infusion 40 mL (has no administration in time range)   acetaminophen (TYLENOL) tablet 1,000 mg (has no administration in time range)   sennosides-docusate (PERICOLACE) 8.6-50 MG per tablet 1 tablet ( Oral Dose Auto Held 11/15/23 2100)     And   polyethylene glycol (MIRALAX) packet 17 g ( Oral MAR Hold 10/30/23 1436)     And   bisacodyl (DULCOLAX) EC tablet 5 mg ( Oral MAR Hold 10/30/23 1436)     And   bisacodyl (DULCOLAX) suppository 10 mg ( Rectal MAR Hold 10/30/23 1436)   oxyCODONE (ROXICODONE) immediate release tablet 5 mg (5 mg Oral Given 10/30/23 0834)   sodium chloride 0.9 % infusion ( Intravenous New Bag 10/30/23 1722)   TRANEXAMIC ACID 2000 MG IN 50 ML SYRINGE (TOPICAL) SIMPLE syringe 2,000 mg 50 mL ( Topical MAR Unhold 10/30/23 1828)   sodium chloride 0.9 % flush 10 mL (10 mL Intravenous Given 10/31/23 0907)   sodium chloride 0.9 % flush 10 mL (has no administration in time range)   sodium chloride 0.9 % infusion 40 mL (has no administration in time range)   acetaminophen (TYLENOL) tablet 1,000 mg (1,000 mg Oral Given 10/31/23 1114)   HYDROcodone-acetaminophen (NORCO) 7.5-325 MG per tablet 1 tablet (1 tablet Oral Given 10/31/23 0906)   HYDROcodone-acetaminophen (NORCO) 7.5-325 MG per tablet 2 tablet (2 tablets Oral Given 10/30/23 2023)   morphine injection 4 mg (has no administration in time range)     And   naloxone (NARCAN) injection 0.4 mg (has no administration in time range)   ondansetron (ZOFRAN) tablet 4 mg (has no administration in time range)     Or   ondansetron (ZOFRAN) injection 4 mg (has no administration in time range)   promethazine (PHENERGAN) tablet 12.5 mg (has no administration in time range)     Or   promethazine (PHENERGAN) suppository 12.5 mg (has no administration in time range)   magnesium hydroxide (MILK OF MAGNESIA) suspension 10 mL (has no administration in time range)   famotidine  (PEPCID) tablet 40 mg (40 mg Oral Given 10/31/23 0906)   ferrous sulfate tablet 325 mg (325 mg Oral Given 10/31/23 0906)   Enoxaparin Sodium (LOVENOX) syringe 40 mg (40 mg Subcutaneous Given 10/31/23 0907)   nicotine (NICODERM CQ) 21 MG/24HR patch 1 patch (1 patch Transdermal Medication Applied 10/30/23 2018)   HYDROmorphone (DILAUDID) injection 0.5 mg (0.5 mg Intravenous Given 10/29/23 2140)   ondansetron (ZOFRAN) injection 4 mg (4 mg Intravenous Given 10/29/23 2140)   HYDROmorphone (DILAUDID) injection 1 mg (1 mg Intravenous Given 10/30/23 0330)   HYDROmorphone (DILAUDID) injection 0.5 mg (0.5 mg Intravenous Given 10/30/23 1743)   ceFAZolin in dextrose (ANCEF) IVPB solution 2,000 mg (2,000 mg Intravenous New Bag 10/31/23 0330)        ED Course:     The patient was seen and evaluated in the ED by me.  The above history and physical examination was performed as documented.  Patient was given narcotic analgesics, antiemetics, and nicotine patch.  Patient was found to have a right hip fracture.  In addition, the patient was also noted to have some coarse breath sounds and had a persistent pulse ox of 89%.  I explained to the patient that even there is never been diagnosed with COPD he is developing advanced stages of COPD and that if he continues to smoke he will be oxygen dependent very soon which will have significant impact on his lifestyle.  Dr. Wolfe was consult for orthopedics.  Hospital service was consulted for admission.    Labs:    Lab Results (last 24 hours)       Procedure Component Value Units Date/Time    Basic Metabolic Panel [[id number]]  (Abnormal) Collected: 10/31/23 0401    Specimen: Blood Updated: 10/31/23 0529     Glucose 165 mg/dL      BUN 9 mg/dL      Creatinine 0.72 mg/dL      Sodium 133 mmol/L      Potassium 4.5 mmol/L      Chloride 98 mmol/L      CO2 28.7 mmol/L      Calcium 8.7 mg/dL      BUN/Creatinine Ratio 12.5     Anion Gap 6.3 mmol/L      eGFR 101.4 mL/min/1.73     Narrative:      GFR  Normal >60  Chronic Kidney Disease <60  Kidney Failure <15      Hemoglobin & Hematocrit, Blood [[id number]]  (Abnormal) Collected: 10/31/23 0401    Specimen: Blood Updated: 10/31/23 0455     Hemoglobin 11.0 g/dL      Hematocrit 35.5 %              Imaging:    FL Surgery Fluoro    Result Date: 10/30/2023  This procedure was auto-finalized with no dictation required.       Differential Diagnosis and Discussion:    Extremity Pain: Differential diagnosis includes but is not limited to soft tissue sprain, tendonitis, tendon injury, dislocation, fracture, deep vein thrombosis, arterial insufficiency, osteoarthritis, bursitis, and ligamentous damage.    All labs were reviewed and interpreted by me.  All X-rays impressions were independently interpreted by me.    MDM     Amount and/or Complexity of Data Reviewed  Clinical lab tests: reviewed  Tests in the radiology section of CPT®: reviewed             Patient Care Considerations:          Consultants/Shared Management Plan:    Hospitalist: I have discussed the case with Dr. Coronado who agrees to accept the patient for admission.  Consultant: I have discussed the case with Dr. Wolfe who agrees to consult on the patient.    Social Determinants of Health:    Patient is independent, reliable, and has access to care.       Disposition and Care Coordination:    Admit:   Through independent evaluation of the patient's history, physical, and imperical data, the patient meets criteria for observation/admission to the hospital.        Final diagnoses:   Closed intertrochanteric fracture of hip, right, initial encounter   Fall on same level from slipping, tripping or stumbling, initial encounter   Chronic obstructive pulmonary disease, unspecified COPD type        ED Disposition       ED Disposition   Decision to Admit    Condition   --    Comment   Level of Care: Med/Surg [1]   Diagnosis: Fall [[id number]]   Certification: I Certify That Inpatient Hospital Services Are Medically Necessary For  Greater Than 2 Midnights                 This medical record created using voice recognition software.             Stephon Morataya DO  10/31/23 6010

## 2023-10-30 NOTE — PLAN OF CARE
Problem: Adult Inpatient Plan of Care  Goal: Absence of Hospital-Acquired Illness or Injury  Intervention: Identify and Manage Fall Risk  Recent Flowsheet Documentation  Taken 10/30/2023 0336 by Sadia Jenkins LPN  Safety Promotion/Fall Prevention: safety round/check completed  Taken 10/30/2023 0245 by Sadia Jenkins LPN  Safety Promotion/Fall Prevention:   room organization consistent   safety round/check completed  Taken 10/30/2023 0146 by Sadia Jenkins LPN  Safety Promotion/Fall Prevention: safety round/check completed  Intervention: Prevent Skin Injury  Recent Flowsheet Documentation  Taken 10/30/2023 0336 by Sadia Jenkins LPN  Body Position: weight shifting  Goal: Optimal Comfort and Wellbeing  Intervention: Monitor Pain and Promote Comfort  Recent Flowsheet Documentation  Taken 10/30/2023 0141 by Sadia Jenkins LPN  Pain Management Interventions: see MAR  Goal: Readiness for Transition of Care  Intervention: Mutually Develop Transition Plan  Recent Flowsheet Documentation  Taken 10/30/2023 0056 by Sadia Jenkins LPN  Transportation Anticipated: family or friend will provide  Patient/Family Anticipated Services at Transition: none  Patient/Family Anticipates Transition to: home with family  Taken 10/30/2023 0052 by Sadia Jenkins LPN  Equipment Currently Used at Home: none   Goal Outcome Evaluation:      Pt New admit  from the ED, A&OX4, pain controlled, NPO since midnight, CHG bath done this shift for procedure today, wife at bedside, Notified MD of pt refusal of Pravastatin see MAR for new order, pt stable throughout the shift. Pt resting in bed, is able to make needs known, call light in reach, will continue current POC

## 2023-10-30 NOTE — PLAN OF CARE
Problem: Adult Inpatient Plan of Care  Goal: Plan of Care Review  Outcome: Ongoing, Progressing  Flowsheets (Taken 10/30/2023 1835)  Progress: no change  Plan of Care Reviewed With: patient  Outcome Evaluation: VSS. No complaint of pain or discomfort at this time. Family visiting at bedside. Patient had a procedure today. Tolerated well. Will continue plan of care.  Goal: Patient-Specific Goal (Individualized)  Outcome: Ongoing, Progressing  Goal: Absence of Hospital-Acquired Illness or Injury  Outcome: Ongoing, Progressing  Intervention: Identify and Manage Fall Risk  Recent Flowsheet Documentation  Taken 10/30/2023 1834 by Tamy Urbano RN  Safety Promotion/Fall Prevention: safety round/check completed  Taken 10/30/2023 1515 by Tamy Urbano RN  Safety Promotion/Fall Prevention: patient off unit  Taken 10/30/2023 1320 by Tamy Urbano RN  Safety Promotion/Fall Prevention: safety round/check completed  Taken 10/30/2023 1241 by Tamy Urbano RN  Safety Promotion/Fall Prevention: safety round/check completed  Taken 10/30/2023 1200 by Tamy Urbano RN  Safety Promotion/Fall Prevention: safety round/check completed  Goal: Optimal Comfort and Wellbeing  Outcome: Ongoing, Progressing  Goal: Readiness for Transition of Care  Outcome: Ongoing, Progressing     Problem: Skin Injury Risk Increased  Goal: Skin Health and Integrity  Outcome: Ongoing, Progressing     Problem: Pain Acute  Goal: Acceptable Pain Control and Functional Ability  Outcome: Ongoing, Progressing     Problem: Fall Injury Risk  Goal: Absence of Fall and Fall-Related Injury  Outcome: Ongoing, Progressing  Intervention: Promote Injury-Free Environment  Recent Flowsheet Documentation  Taken 10/30/2023 1834 by Tamy Urbano RN  Safety Promotion/Fall Prevention: safety round/check completed  Taken 10/30/2023 1515 by Tamy Urbano RN  Safety Promotion/Fall Prevention: patient off unit  Taken 10/30/2023 1320 by Tamy Urbano RN  Safety  Promotion/Fall Prevention: safety round/check completed  Taken 10/30/2023 1241 by Tamy Urbano RN  Safety Promotion/Fall Prevention: safety round/check completed  Taken 10/30/2023 1200 by Tamy Urbano RN  Safety Promotion/Fall Prevention: safety round/check completed     Problem: Bleeding (Orthopaedic Fracture)  Goal: Absence of Bleeding  Outcome: Ongoing, Progressing     Problem: Embolism (Orthopaedic Fracture)  Goal: Absence of Embolism Signs and Symptoms  Outcome: Ongoing, Progressing     Problem: Fracture Stabilization and Management (Orthopaedic Fracture)  Goal: Fracture Stability  Outcome: Ongoing, Progressing     Problem: Functional Ability Impaired (Orthopaedic Fracture)  Goal: Optimal Functional Ability  Outcome: Ongoing, Progressing     Problem: Infection (Orthopaedic Fracture)  Goal: Absence of Infection Signs and Symptoms  Outcome: Ongoing, Progressing     Problem: Neurovascular Compromise (Orthopaedic Fracture)  Goal: Effective Tissue Perfusion  Outcome: Ongoing, Progressing     Problem: Pain (Orthopaedic Fracture)  Goal: Acceptable Pain Control  Outcome: Ongoing, Progressing     Problem: Respiratory Compromise (Orthopaedic Fracture)  Goal: Effective Oxygenation and Ventilation  Outcome: Ongoing, Progressing   Goal Outcome Evaluation:  Plan of Care Reviewed With: patient        Progress: no change  Outcome Evaluation: VSS. No complaint of pain or discomfort at this time. Family visiting at bedside. Patient had a procedure today. Tolerated well. Will continue plan of care.

## 2023-10-30 NOTE — H&P
[facility]   HOSPITALIST HISTORY AND PHYSICAL  Date: 10/29/2023   Patient Name: Colton Chiang  : 1960  MRN: [id number]  Primary Care Physician:  Eduarda Butt DO  Date of admission: 10/29/2023    Subjective   Subjective     Chief Complaint: Fall    HPI:    Colton Chiang is a 63 y.o. male with past medical history significant for HTN, hypothyroidism, HLD, who presented after a fall.    Patient was at [employer] today when the fall happened.  He slipped over something and fell on his right side.  He denies hitting his head.  He is not on anticoag.  He has pain on his right hip following the fall.  He endorses some redness on his leg.  He denies fever, no chills, no chest pain, no dysuria.      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, bilateral     Stroke     Thyroid disorder     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            Past Surgical History:  Past Surgical History:   Procedure Laterality Date    COLONOSCOPY      PROSTATE BIOPSY         Family History:   Family History   Problem Relation Age of Onset    Breast cancer Sister     Heart disease Other     Diabetes Other         Unspecified       Social History:   Social History     Socioeconomic History    Marital status:    Tobacco Use    Smoking status: Every Day     Packs/day: 1.50     Years: 50.00     Additional pack years: 0.00     Total pack years: 75.00     Types: Cigarettes     Start date: 1972    Smokeless tobacco: Never   Vaping Use    Vaping Use: Never used   Substance and Sexual Activity    Alcohol use: Never    Drug use: Never    Sexual activity:  Defer       Home Medications:  Co Q-10, Magnesium, Vitamin C, Vitamin D3, Zinc, aspirin, doxycycline, ferrous sulfate, furosemide, levothyroxine, liothyronine, metoprolol succinate XL, multivitamin with minerals, and pravastatin    Allergies:  Allergies   Allergen Reactions    Penicillin G Hives     Reaction as a kid    Penicillins Irritability       Review of Systems   All systems were reviewed and negative except for indicated in HPI    Objective   Objective     Vitals:   Temp:  [98 °F (36.7 °C)-98.3 °F (36.8 °C)] 98 °F (36.7 °C)  Heart Rate:  [66] 66  Resp:  [18] 18  BP: (124)/(72) 124/72    Physical Exam    Constitutional: Awake, alert, no acute distress   Eyes: Pupils equal, sclerae anicteric, no conjunctival injection   HENT: NCAT, mucous membranes moist   Neck: Supple, no thyromegaly, no lymphadenopathy, trachea midline   Respiratory: Clear to auscultation bilaterally, nonlabored respirations    Cardiovascular: RRR, no murmurs, rubs, or gallops, palpable pedal pulses bilaterally   Gastrointestinal: Positive bowel sounds, soft, nontender, nondistended   Musculoskeletal: Tenderness on palpation on the right femur, no bilateral ankle edema, no clubbing or cyanosis to extremities   Psychiatric: Appropriate affect, cooperative   Neurologic: Oriented x 3, strength symmetric in all extremities, Cranial Nerves grossly intact to confrontation, speech clear   Skin: Right lower extremity redness and warmth    Result Review    Result Review:  I have personally reviewed the results from the time of this admission to 10/29/2023 23:00 EDT and agree with these findings:  [x]  Laboratory  [x]  Microbiology  [x]  Radiology  [x]  EKG/Telemetry   [x]  Cardiology/Vascular   []  Pathology  []  Old records  []  Other:    Laboratory Studies:  Recent Results (from the past 24 hour(s))   Comprehensive Metabolic Panel    Collection Time: 10/29/23  9:39 PM    Specimen: Blood   Result Value Ref Range    Glucose 103 (H) 65 - 99 mg/dL     BUN 15 8 - 23 mg/dL    Creatinine 0.73 (L) 0.76 - 1.27 mg/dL    Sodium 132 (L) 136 - 145 mmol/L    Potassium 4.3 3.5 - 5.2 mmol/L    Chloride 95 (L) 98 - 107 mmol/L    CO2 27.8 22.0 - 29.0 mmol/L    Calcium 8.9 8.6 - 10.5 mg/dL    Total Protein 7.3 6.0 - 8.5 g/dL    Albumin 3.8 3.5 - 5.2 g/dL    ALT (SGPT) 29 1 - 41 U/L    AST (SGOT) 26 1 - 40 U/L    Alkaline Phosphatase 98 39 - 117 U/L    Total Bilirubin 0.3 0.0 - 1.2 mg/dL    Globulin 3.5 gm/dL    A/G Ratio 1.1 g/dL    BUN/Creatinine Ratio 20.5 7.0 - 25.0    Anion Gap 9.2 5.0 - 15.0 mmol/L    eGFR 101.0 >60.0 mL/min/1.73   aPTT    Collection Time: 10/29/23  9:39 PM    Specimen: Blood   Result Value Ref Range    PTT 34.6 (L) 78.0 - 95.9 seconds   Lipase    Collection Time: 10/29/23  9:39 PM    Specimen: Blood   Result Value Ref Range    Lipase 14 13 - 60 U/L   CBC Auto Differential    Collection Time: 10/29/23  9:39 PM    Specimen: Blood   Result Value Ref Range    WBC 12.12 (H) 3.40 - 10.80 10*3/mm3    RBC 4.64 4.14 - 5.80 10*6/mm3    Hemoglobin 12.8 (L) 13.0 - 17.7 g/dL    Hematocrit 39.6 37.5 - 51.0 %    MCV 85.3 79.0 - 97.0 fL    MCH 27.6 26.6 - 33.0 pg    MCHC 32.3 31.5 - 35.7 g/dL    RDW 13.8 12.3 - 15.4 %    RDW-SD 42.6 37.0 - 54.0 fl    MPV 9.4 6.0 - 12.0 fL    Platelets 255 140 - 450 10*3/mm3    Neutrophil % 82.9 (H) 42.7 - 76.0 %    Lymphocyte % 7.8 (L) 19.6 - 45.3 %    Monocyte % 7.1 5.0 - 12.0 %    Eosinophil % 1.5 0.3 - 6.2 %    Basophil % 0.3 0.0 - 1.5 %    Immature Grans % 0.4 0.0 - 0.5 %    Neutrophils, Absolute 10.04 (H) 1.70 - 7.00 10*3/mm3    Lymphocytes, Absolute 0.95 0.70 - 3.10 10*3/mm3    Monocytes, Absolute 0.86 0.10 - 0.90 10*3/mm3    Eosinophils, Absolute 0.18 0.00 - 0.40 10*3/mm3    Basophils, Absolute 0.04 0.00 - 0.20 10*3/mm3    Immature Grans, Absolute 0.05 0.00 - 0.05 10*3/mm3    nRBC 0.0 0.0 - 0.2 /100 WBC       Imaging:  XR Hip With or Without Pelvis 2 - 3 View Right    Result Date: 10/29/2023  Narrative: PROCEDURE: XR HIP W OR WO  PELVIS 2-3 VIEW RIGHT  COMPARISON: [facility], CR, XR HIPS BILATERAL W OR WO PELVIS 2 VIEW, 9/01/2022, 17:08.  INDICATIONS: FALL WITH RIGHT HIP PAIN  FINDINGS:  There is an acute minimally displaced intertrochanteric fracture of the right proximal femur.  There is mild narrowing the right hip joint space.  There is mild narrowing left hip joint space.  No definite pelvic fracture identified.  Left hip appears intact.  Soft tissues are unremarkable.      Impression:   1. Acute intertrochanteric fracture of the right proximal femur.      VIV RODRIGUEZ MD       Electronically Signed and Approved By: VIV RODRIGUEZ MD on 10/29/2023 at 21:33              EKG: (my review): Not indicated    Assessment & Plan   Assessment / Plan       -I have discussed the case with the ED physician.  I have talked to the patient and his condition.  I have reviewed his labs, imaging, and record.    -Mechanical fall, complicated by acute intertrochanteric right proximal femur fracture: Patient slipped at [employer] today and fell on the right side.  Denies hitting his head.  The ED has discussed the case with Dr. Wolfe orthopedic surgery.  Keep NPO.  They will see him in the morning.  -Pain control and bowel regimen.    -Right lower leg cellulitis: I started him on ceftriaxone.  Venous duplex ultrasound outpatient was negative for DVT.    Resume home medication as appropriate.  DVT prophylaxis with heparin.    CODE STATUS:    Level Of Support Discussed With: Patient  Code Status (Patient has no pulse and is not breathing): CPR (Attempt to Resuscitate)  Medical Interventions (Patient has pulse or is breathing): Full Support      Admission Status:  I believe this patient meets admission status.    Electronically signed by Mele Coronado MD, 10/29/23, 11:00 PM EDT.

## 2023-10-30 NOTE — ANESTHESIA POSTPROCEDURE EVALUATION
Patient: Colton Chiang    Procedure Summary       Date: 10/30/23 Room / Location: [facility] OR 06 / [facility] MAIN OR    Anesthesia Start: 1543 Anesthesia Stop: 1726    Procedure: HIP TROCHANTERIC NAILING WITH INTRAMEDULLARY HIP SCREW (Right: Hip) Diagnosis:       Closed intertrochanteric fracture of hip, right, initial encounter      (Closed intertrochanteric fracture of hip, right, initial encounter [S72.141A])    Surgeons: Sandra Wolfe MD Provider: Chavo Osorio MD    Anesthesia Type: general ASA Status: 4            Anesthesia Type: general    Vitals  Vitals Value Taken Time   BP 99/54 10/30/23 1802   Temp 36.3 °C (97.3 °F) 10/30/23 1715   Pulse 74 10/30/23 1807   Resp 12 10/30/23 1800   SpO2 99 % 10/30/23 1807   Vitals shown include unfiled device data.        Post Anesthesia Care and Evaluation    Patient location during evaluation: bedside  Patient participation: complete - patient participated  Level of consciousness: awake  Pain management: adequate    Airway patency: patent  PONV Status: none  Cardiovascular status: acceptable and stable  Respiratory status: acceptable  Hydration status: acceptable    Comments: An Anesthesiologist personally participated in the most demanding procedures (including induction and emergence if applicable) in the anesthesia plan, monitored the course of anesthesia administration at frequent intervals and remained physically present and available for immediate diagnosis and treatment of emergencies.

## 2023-10-30 NOTE — ANESTHESIA PREPROCEDURE EVALUATION
Anesthesia Evaluation     Patient summary reviewed and Nursing notes reviewed                Airway   Mallampati: II  TM distance: >3 FB  Neck ROM: full  No difficulty expected  Dental      Pulmonary - negative pulmonary ROS   (+) ,rhonchi  Cardiovascular - normal exam    Rhythm: regular  Rate: normal    (+) hypertension, CHF , hyperlipidemia      Neuro/Psych  (+) CVA, psychiatric history  GI/Hepatic/Renal/Endo    (+) obesity, thyroid problem     Musculoskeletal     Abdominal    Substance History - negative use     OB/GYN negative ob/gyn ROS         Other   arthritis,   history of cancer                Anesthesia Plan    ASA 4     general     Reason for not using neuraxial anesthesia or peripheral nerve block: Patient Preference  intravenous induction     Anesthetic plan, risks, benefits, and alternatives have been provided, discussed and informed consent has been obtained with: patient.    CODE STATUS:    Level Of Support Discussed With: Patient  Code Status (Patient has no pulse and is not breathing): CPR (Attempt to Resuscitate)  Medical Interventions (Patient has pulse or is breathing): Full Support

## 2023-10-30 NOTE — PROGRESS NOTES
[facility]   Hospitalist Progress Note  Date: 10/30/2023  Patient Name: Colton Chiang  : 1960  MRN: [id number]  Date of admission: 10/29/2023  Room/Bed: [facility]      Subjective   Subjective     Chief Complaint: Fall    Summary:Colton Chiang is a 63 y.o. male with past medical history significant for HTN, hypothyroidism, HLD, prostate cancer s/p radiation, and tobacco abuse who presented after a fall. Patient was at [facility] 10/29/2023 when the fall happened.  He slipped over something and fell on his right side.  He denies hitting his head.  He is not on anticoag.  He has pain on his right hip following the fall.  He endorses some redness on his leg.  He denies fever, no chills, no chest pain, no dysuria.    Interval Followup: No acute interval events.  No acute distress.  Patient is resting comfortably.  Reports his pain is mostly controlled.  Family is at bedside.  He denies any chest pain, shortness of breath, or abdominal pain.  Reports that he had a PET scan ordered for Wednesday for possible prostate cancer recurrence from urology.    Review of Systems    All systems reviewed and negative except for what is outlined above.      Objective   Objective     Vitals:   Temp:  [97.6 °F (36.4 °C)-98.3 °F (36.8 °C)] 98.1 °F (36.7 °C)  Heart Rate:  [59-68] 60  Resp:  [18-24] 20  BP: (104-129)/(56-72) 110/63  Flow (L/min):  [2] 2    Physical Exam   Gen: NAD, Alert and Oriented  Cards: RRR, no murmur   Pulm: CTA b/l, no wheezing  Abd: soft, nondistended  Extremities: no pitting edema    Result Review    Result Review:  I have personally reviewed these results:  [x]  Laboratory      Lab 10/30/23  0402 10/29/23  2139   WBC 9.32 12.12*   HEMOGLOBIN 11.3* 12.8*   HEMATOCRIT 36.3* 39.6   PLATELETS 240 255   NEUTROS ABS  --  10.04*   IMMATURE GRANS (ABS)  --  0.05   LYMPHS ABS  --  0.95   MONOS ABS  --  0.86   EOS ABS  --  0.18   MCV 87.1 85.3   APTT  --  34.6*         Lab 10/30/23  0402 10/29/23  2139   SODIUM 134*  132*   POTASSIUM 4.1 4.3   CHLORIDE 97* 95*   CO2 28.5 27.8   ANION GAP 8.5 9.2   BUN 15 15   CREATININE 0.57* 0.73*   EGFR 108.8 101.0   GLUCOSE 131* 103*   CALCIUM 8.5* 8.9   MAGNESIUM 1.9  --    PHOSPHORUS 3.8  --    TSH  --  2.530         Lab 10/30/23  0402 10/29/23  2139   TOTAL PROTEIN 6.4 7.3   ALBUMIN 3.2* 3.8   GLOBULIN 3.2 3.5   ALT (SGPT) 25 29   AST (SGOT) 22 26   BILIRUBIN 0.2 0.3   ALK PHOS 84 98   LIPASE  --  14                     Brief Urine Lab Results  (Last result in the past 365 days)        Color   Clarity   Blood   Leuk Est   Nitrite   Protein   CREAT   Urine HCG        08/14/23 0825 Yellow   Clear   Negative   Negative   Negative   Negative                 [x]  Microbiology   Microbiology Results (last 10 days)       ** No results found for the last 240 hours. **          [x]  Radiology  XR Hip With or Without Pelvis 2 - 3 View Right    Result Date: 10/29/2023    1. Acute intertrochanteric fracture of the right proximal femur.      VIV RODRIGUEZ MD       Electronically Signed and Approved By: VIV RODRIGUEZ MD on 10/29/2023 at 21:33            []  EKG/Telemetry   []  Cardiology/Vascular   []  Pathology  []  Old records  []  Other:    Assessment & Plan   Assessment / Plan     Assessment:  Right proximal femur fracture  Mechanical fall  History prostate cancer, s/p radiation  Hypertension   Hyperlipidemia  Hypothyroidism  Tobacco abuse  Right lower leg cellulitis    Plan:  Orthopedics consulted  OR plan for 10/30/2023  PT/OT  Multimodal pain control  As needed Zofran  Home meds reviewed and resumed as needed  Started on Rocephin at admission for cellulitis.  However, patient reports that this has been present for about a year.  Suspect he may have venous insufficiency, will discontinue antibiotics and observe.  Nicotine patch  Patient reports that he is being evaluated for possible recurrence of his prostate cancer.  Originally diagnosed in 2008 and is s/p radiation.  Reports that his PSA has  recently doubled and he had a PET scan scheduled for Wednesday.       Discussed with RN.    DVT prophylaxis:  Mechanical DVT prophylaxis orders are present.    CODE STATUS:   Level Of Support Discussed With: Patient  Code Status (Patient has no pulse and is not breathing): CPR (Attempt to Resuscitate)  Medical Interventions (Patient has pulse or is breathing): Full Support      Electronically signed by Josephine Fuentes DO, 10/30/23, 11:53 AM EDT.

## 2023-10-31 LAB
ANION GAP SERPL CALCULATED.3IONS-SCNC: 6.3 MMOL/L (ref 5–15)
BUN SERPL-MCNC: 9 MG/DL (ref 8–23)
BUN/CREAT SERPL: 12.5 (ref 7–25)
CALCIUM SPEC-SCNC: 8.7 MG/DL (ref 8.6–10.5)
CHLORIDE SERPL-SCNC: 98 MMOL/L (ref 98–107)
CO2 SERPL-SCNC: 28.7 MMOL/L (ref 22–29)
CREAT SERPL-MCNC: 0.72 MG/DL (ref 0.76–1.27)
EGFRCR SERPLBLD CKD-EPI 2021: 101.4 ML/MIN/1.73
GLUCOSE SERPL-MCNC: 165 MG/DL (ref 65–99)
HCT VFR BLD AUTO: 35.5 % (ref 37.5–51)
HGB BLD-MCNC: 11 G/DL (ref 13–17.7)
POTASSIUM SERPL-SCNC: 4.5 MMOL/L (ref 3.5–5.2)
SODIUM SERPL-SCNC: 133 MMOL/L (ref 136–145)

## 2023-10-31 PROCEDURE — 85014 HEMATOCRIT: CPT | Performed by: ORTHOPAEDIC SURGERY

## 2023-10-31 PROCEDURE — 94799 UNLISTED PULMONARY SVC/PX: CPT

## 2023-10-31 PROCEDURE — 97161 PT EVAL LOW COMPLEX 20 MIN: CPT

## 2023-10-31 PROCEDURE — 97165 OT EVAL LOW COMPLEX 30 MIN: CPT

## 2023-10-31 PROCEDURE — 25810000003 LACTATED RINGERS PER 1000 ML: Performed by: ORTHOPAEDIC SURGERY

## 2023-10-31 PROCEDURE — 25010000002 ENOXAPARIN PER 10 MG: Performed by: ORTHOPAEDIC SURGERY

## 2023-10-31 PROCEDURE — 80048 BASIC METABOLIC PNL TOTAL CA: CPT | Performed by: ORTHOPAEDIC SURGERY

## 2023-10-31 PROCEDURE — 85018 HEMOGLOBIN: CPT | Performed by: ORTHOPAEDIC SURGERY

## 2023-10-31 PROCEDURE — 25010000002 CEFAZOLIN IN DEXTROSE 2-4 GM/100ML-% SOLUTION: Performed by: ORTHOPAEDIC SURGERY

## 2023-10-31 RX ADMIN — NICOTINE 1 PATCH: 21 PATCH, EXTENDED RELEASE TRANSDERMAL at 21:13

## 2023-10-31 RX ADMIN — ACETAMINOPHEN 1000 MG: 500 TABLET ORAL at 19:05

## 2023-10-31 RX ADMIN — SODIUM CHLORIDE, POTASSIUM CHLORIDE, SODIUM LACTATE AND CALCIUM CHLORIDE 100 ML/HR: 600; 310; 30; 20 INJECTION, SOLUTION INTRAVENOUS at 01:36

## 2023-10-31 RX ADMIN — DOCUSATE SODIUM 50MG AND SENNOSIDES 8.6MG 1 TABLET: 8.6; 5 TABLET, FILM COATED ORAL at 21:00

## 2023-10-31 RX ADMIN — CEFAZOLIN SODIUM 2000 MG: 2 INJECTION, SOLUTION INTRAVENOUS at 03:30

## 2023-10-31 RX ADMIN — HYDROCODONE BITARTRATE AND ACETAMINOPHEN 1 TABLET: 7.5; 325 TABLET ORAL at 16:25

## 2023-10-31 RX ADMIN — SODIUM CHLORIDE, POTASSIUM CHLORIDE, SODIUM LACTATE AND CALCIUM CHLORIDE 100 ML/HR: 600; 310; 30; 20 INJECTION, SOLUTION INTRAVENOUS at 11:24

## 2023-10-31 RX ADMIN — METOPROLOL SUCCINATE 25 MG: 25 TABLET, EXTENDED RELEASE ORAL at 09:06

## 2023-10-31 RX ADMIN — LIOTHYRONINE SODIUM 10 MCG: 5 TABLET ORAL at 05:40

## 2023-10-31 RX ADMIN — ENOXAPARIN SODIUM 40 MG: 100 INJECTION SUBCUTANEOUS at 09:07

## 2023-10-31 RX ADMIN — Medication 10 ML: at 09:07

## 2023-10-31 RX ADMIN — Medication 10 ML: at 21:14

## 2023-10-31 RX ADMIN — ACETAMINOPHEN 1000 MG: 500 TABLET ORAL at 11:14

## 2023-10-31 RX ADMIN — FAMOTIDINE 40 MG: 20 TABLET, FILM COATED ORAL at 09:06

## 2023-10-31 RX ADMIN — Medication 10 ML: at 21:45

## 2023-10-31 RX ADMIN — FERROUS SULFATE TAB 325 MG (65 MG ELEMENTAL FE) 325 MG: 325 (65 FE) TAB at 09:06

## 2023-10-31 RX ADMIN — HYDROCODONE BITARTRATE AND ACETAMINOPHEN 1 TABLET: 7.5; 325 TABLET ORAL at 09:06

## 2023-10-31 RX ADMIN — HYDROCODONE BITARTRATE AND ACETAMINOPHEN 1 TABLET: 7.5; 325 TABLET ORAL at 23:07

## 2023-10-31 RX ADMIN — LEVOTHYROXINE SODIUM 274 MCG: 137 TABLET ORAL at 05:40

## 2023-10-31 NOTE — PROGRESS NOTES
Orthopedic hip nailing progress Note    Assessment/Plan eager for therapy, weight-bear as tolerated, DVT prophylaxis, follow-up 2 weeks    Status post-right hip nailing: Doing well postoperatively.    Pain Relief: some relief    Continues current post-op course    Activity: up with assistance    Weight Bearing: WBAT     LOS: 0 days     Subjective     Post-Operative Day: 1 post-op hip nailing   systemic or Specific Complaints: No Complaints    Objective     Vital signs in last 24 hours:  Vitals:    10/30/23 2328 10/31/23 0101 10/31/23 0314 10/31/23 0424   BP: 118/66  126/66    BP Location: Right arm  Left arm    Patient Position: Lying  Lying    Pulse: 62 55 63 61   Resp: 16  16    Temp: 97.7 °F (36.5 °C)  97.7 °F (36.5 °C)    TempSrc: Oral  Oral    SpO2: 93% 96% 93% 95%   Weight:       Height:            General: alert, appears stated age, and cooperative   Neurovascular: Tibial nerve: Intact, Superficial peroneal nerve: Intact, and Deep peroneal nerve: Intact  Capillary refill: Normal   Wound: Wound clean and dry no evidence of infection.   Range of Motion: Limited flexsion and Limited extension   DVT Exam: No evidence of DVT seen on physical exam.      WBC   Date Value Ref Range Status   10/30/2023 9.32 3.40 - 10.80 10*3/mm3 Final     RBC   Date Value Ref Range Status   10/30/2023 4.17 4.14 - 5.80 10*6/mm3 Final     Hemoglobin   Date Value Ref Range Status   10/31/2023 11.0 (L) 13.0 - 17.7 g/dL Final     Hematocrit   Date Value Ref Range Status   10/31/2023 35.5 (L) 37.5 - 51.0 % Final     MCV   Date Value Ref Range Status   10/30/2023 87.1 79.0 - 97.0 fL Final     MCH   Date Value Ref Range Status   10/30/2023 27.1 26.6 - 33.0 pg Final     MCHC   Date Value Ref Range Status   10/30/2023 31.1 (L) 31.5 - 35.7 g/dL Final     RDW   Date Value Ref Range Status   10/30/2023 13.7 12.3 - 15.4 % Final     RDW-SD   Date Value Ref Range Status   10/30/2023 43.8 37.0 - 54.0 fl Final     MPV   Date Value Ref Range Status  "  10/30/2023 9.5 6.0 - 12.0 fL Final     Platelets   Date Value Ref Range Status   10/30/2023 240 140 - 450 10*3/mm3 Final     Neutrophil %   Date Value Ref Range Status   10/29/2023 82.9 (H) 42.7 - 76.0 % Final     Lymphocyte %   Date Value Ref Range Status   10/29/2023 7.8 (L) 19.6 - 45.3 % Final     Monocyte %   Date Value Ref Range Status   10/29/2023 7.1 5.0 - 12.0 % Final     Eosinophil %   Date Value Ref Range Status   10/29/2023 1.5 0.3 - 6.2 % Final     Basophil %   Date Value Ref Range Status   10/29/2023 0.3 0.0 - 1.5 % Final     Immature Grans %   Date Value Ref Range Status   10/29/2023 0.4 0.0 - 0.5 % Final     Neutrophils, Absolute   Date Value Ref Range Status   10/29/2023 10.04 (H) 1.70 - 7.00 10*3/mm3 Final     Lymphocytes, Absolute   Date Value Ref Range Status   10/29/2023 0.95 0.70 - 3.10 10*3/mm3 Final     Monocytes, Absolute   Date Value Ref Range Status   10/29/2023 0.86 0.10 - 0.90 10*3/mm3 Final     Eosinophils, Absolute   Date Value Ref Range Status   10/29/2023 0.18 0.00 - 0.40 10*3/mm3 Final     Basophils, Absolute   Date Value Ref Range Status   10/29/2023 0.04 0.00 - 0.20 10*3/mm3 Final     Immature Grans, Absolute   Date Value Ref Range Status   10/29/2023 0.05 0.00 - 0.05 10*3/mm3 Final     nRBC   Date Value Ref Range Status   10/29/2023 0.0 0.0 - 0.2 /100 WBC Final        Basic Metabolic Panel    Sodium Sodium   Date Value Ref Range Status   10/31/2023 133 (L) 136 - 145 mmol/L Final   10/30/2023 134 (L) 136 - 145 mmol/L Final   10/29/2023 132 (L) 136 - 145 mmol/L Final      Potassium Potassium   Date Value Ref Range Status   10/31/2023 4.5 3.5 - 5.2 mmol/L Final   10/30/2023 4.1 3.5 - 5.2 mmol/L Final   10/29/2023 4.3 3.5 - 5.2 mmol/L Final      Chloride Chloride   Date Value Ref Range Status   10/31/2023 98 98 - 107 mmol/L Final   10/30/2023 97 (L) 98 - 107 mmol/L Final   10/29/2023 95 (L) 98 - 107 mmol/L Final      Bicarbonate No results found for: \"PLASMABICARB\"   BUN BUN   Date " "Value Ref Range Status   10/31/2023 9 8 - 23 mg/dL Final   10/30/2023 15 8 - 23 mg/dL Final   10/29/2023 15 8 - 23 mg/dL Final      Creatinine Creatinine   Date Value Ref Range Status   10/31/2023 0.72 (L) 0.76 - 1.27 mg/dL Final   10/30/2023 0.57 (L) 0.76 - 1.27 mg/dL Final   10/29/2023 0.73 (L) 0.76 - 1.27 mg/dL Final      Calcium Calcium   Date Value Ref Range Status   10/31/2023 8.7 8.6 - 10.5 mg/dL Final   10/30/2023 8.5 (L) 8.6 - 10.5 mg/dL Final   10/29/2023 8.9 8.6 - 10.5 mg/dL Final      Glucose      No components found for: \"GLUCOSE.*\"      FL Surgery Fluoro   Final Result      XR Hip With or Without Pelvis 2 - 3 View Right   Final Result       1. Acute intertrochanteric fracture of the right proximal femur.                    VIV RODRIGUEZ MD          Electronically Signed and Approved By: VIV RODRIGUEZ MD on 10/29/2023 at 21:33                                "

## 2023-10-31 NOTE — PLAN OF CARE
Goal Outcome Evaluation:  Plan of Care Reviewed With: patient        Progress: no change  Outcome Evaluation: Patient has experienced decline in function from baseline status, presenting w/ deficits related to balance, strength, safety awareness, transfers and mobility that impede patient independence with activities of daily living.  Patient would benefit from skilled Occupational Therapy intervention to maxamize patient safety, and promote return to baseline independence.      Anticipated Discharge Disposition (OT): inpatient rehabilitation facility, skilled nursing facility

## 2023-10-31 NOTE — PLAN OF CARE
Goal Outcome Evaluation:  Plan of Care Reviewed With: patient, spouse           Outcome Evaluation: Pt presents with impairments in strength, balance, and endurance/activity tolerance affecting functional mobility and ambulation. He will benefit from inpatient PT services, as well as placement in a rehabilitation hospital following discharge.      Anticipated Discharge Disposition (PT): inpatient rehabilitation facility

## 2023-10-31 NOTE — PROGRESS NOTES
[facility]   Hospitalist Progress Note  Date: 10/31/2023  Patient Name: Colton Chiang  : 1960  MRN: [id number]  Date of admission: 10/29/2023  [place] [facility] [place]      Subjective   Subjective     Chief Complaint: Fall    Summary:Colton Chiang is a 63 y.o. male with past medical history significant for HTN, hypothyroidism, HLD, prostate cancer s/p radiation, and tobacco abuse who presented after a fall. Patient was at [facility] 10/29/2023 when the fall happened.  He slipped over something and fell on his right side.  He denies hitting his head.  He is not on anticoag.  He has pain on his right hip following the fall.  He endorses some redness on his leg.  He denies fever, no chills, no chest pain, no dysuria.    Interval Followup: No acute interval events.  No acute distress.  Patient is resting comfortably.  Reports his pain is mostly controlled.    He denies any chest pain, shortness of breath, or abdominal pain.  Reports that he had a PET scan ordered for Wednesday for possible prostate cancer recurrence from urology.    Review of Systems    All systems reviewed and negative except for what is outlined above.      Objective   Objective     Vitals:   Temp:  [97.2 °F (36.2 °C)-98.1 °F (36.7 °C)] 97.7 °F (36.5 °C)  Heart Rate:  [55-91] 61  Resp:  [12-24] 16  BP: ()/(53-73) 126/66  Flow (L/min):  [2-6] 3  FiO2 (%):  [50 %] 50 %    Physical Exam   Gen: NAD, Alert and Oriented. Resting in bedside chair   Cards: RRR, no murmur   Pulm: CTA b/l, no wheezing  Abd: soft, nondistended  Extremities: no pitting edema    Result Review    Result Review:  I have personally reviewed these results:  [x]  Laboratory      Lab 10/31/23  0401 10/30/23  0402 10/29/23  2139   WBC  --  9.32 12.12*   HEMOGLOBIN 11.0* 11.3* 12.8*   HEMATOCRIT 35.5* 36.3* 39.6   PLATELETS  --  240 255   NEUTROS ABS  --   --  10.04*   IMMATURE GRANS (ABS)  --   --  0.05   LYMPHS ABS  --   --  0.95   MONOS ABS  --   --  0.86   EOS ABS  --   --  0.18    MCV  --  87.1 85.3   APTT  --   --  34.6*         Lab 10/31/23  0401 10/30/23  0402 10/29/23  2139   SODIUM 133* 134* 132*   POTASSIUM 4.5 4.1 4.3   CHLORIDE 98 97* 95*   CO2 28.7 28.5 27.8   ANION GAP 6.3 8.5 9.2   BUN 9 15 15   CREATININE 0.72* 0.57* 0.73*   EGFR 101.4 108.8 101.0   GLUCOSE 165* 131* 103*   CALCIUM 8.7 8.5* 8.9   MAGNESIUM  --  1.9  --    PHOSPHORUS  --  3.8  --    TSH  --   --  2.530         Lab 10/30/23  0402 10/29/23  2139   TOTAL PROTEIN 6.4 7.3   ALBUMIN 3.2* 3.8   GLOBULIN 3.2 3.5   ALT (SGPT) 25 29   AST (SGOT) 22 26   BILIRUBIN 0.2 0.3   ALK PHOS 84 98   LIPASE  --  14                     Brief Urine Lab Results  (Last result in the past 365 days)        Color   Clarity   Blood   Leuk Est   Nitrite   Protein   CREAT   Urine HCG        08/14/23 0825 Yellow   Clear   Negative   Negative   Negative   Negative                 [x]  Microbiology   Microbiology Results (last 10 days)       ** No results found for the last 240 hours. **          [x]  Radiology  XR Hip With or Without Pelvis 2 - 3 View Right    Result Date: 10/29/2023    1. Acute intertrochanteric fracture of the right proximal femur.      VIV RODRIGUEZ MD       Electronically Signed and Approved By: VIV RODRIGUEZ MD on 10/29/2023 at 21:33            []  EKG/Telemetry   []  Cardiology/Vascular   []  Pathology  []  Old records  []  Other:    Assessment & Plan   Assessment / Plan     Assessment:  Right proximal femur fracture  Mechanical fall  History prostate cancer, s/p radiation  Hypertension   Hyperlipidemia  Hypothyroidism  Tobacco abuse  Right lower leg cellulitis    Plan:  Orthopedics consulted and s/p HIP TROCHANTERIC NAILING WITH INTRAMEDULLARY HIP SCREW   Patient wants rehab placement   PT/OT  Home meds reviewed and resumed as needed  Nicotine patch  Patient reports that he is being evaluated for possible recurrence of his prostate cancer.  Originally diagnosed in 2008 and is s/p radiation.  Reports that his PSA has  recently doubled and he had a PET scan scheduled for Wednesday.       Discussed with RN.    DVT prophylaxis:  Medical and mechanical DVT prophylaxis orders are present.    CODE STATUS:   Level Of Support Discussed With: Patient  Code Status (Patient has no pulse and is not breathing): CPR (Attempt to Resuscitate)  Medical Interventions (Patient has pulse or is breathing): Full Support

## 2023-10-31 NOTE — PLAN OF CARE
Problem: Adult Inpatient Plan of Care  Goal: Plan of Care Review  Outcome: Ongoing, Progressing  Goal: Patient-Specific Goal (Individualized)  Outcome: Ongoing, Progressing  Goal: Absence of Hospital-Acquired Illness or Injury  Outcome: Ongoing, Progressing  Intervention: Identify and Manage Fall Risk  Recent Flowsheet Documentation  Taken 10/31/2023 1600 by Raina Andersen RN  Safety Promotion/Fall Prevention:   nonskid shoes/slippers when out of bed   safety round/check completed  Taken 10/31/2023 1403 by Raina Andersen RN  Safety Promotion/Fall Prevention:   nonskid shoes/slippers when out of bed   safety round/check completed  Taken 10/31/2023 1248 by Raina Andersen RN  Safety Promotion/Fall Prevention:   nonskid shoes/slippers when out of bed   safety round/check completed  Taken 10/31/2023 1200 by Raina Andersen RN  Safety Promotion/Fall Prevention:   nonskid shoes/slippers when out of bed   safety round/check completed  Taken 10/31/2023 1115 by Raina Andersen RN  Safety Promotion/Fall Prevention:   nonskid shoes/slippers when out of bed   safety round/check completed  Taken 10/31/2023 1016 by Raina Andersen RN  Safety Promotion/Fall Prevention:   nonskid shoes/slippers when out of bed   safety round/check completed  Taken 10/31/2023 1000 by Raina Andersen RN  Safety Promotion/Fall Prevention:   nonskid shoes/slippers when out of bed   safety round/check completed  Taken 10/31/2023 0907 by Raina Andersen RN  Safety Promotion/Fall Prevention:   nonskid shoes/slippers when out of bed   safety round/check completed  Taken 10/31/2023 0830 by Raina Andersen RN  Safety Promotion/Fall Prevention:   nonskid shoes/slippers when out of bed   safety round/check completed  Taken 10/31/2023 0808 by Raina Andersen RN  Safety Promotion/Fall Prevention:   nonskid shoes/slippers when out of bed   safety round/check completed  Taken 10/31/2023 0733 by Raina Andersen RN  Safety Promotion/Fall Prevention:   nonskid shoes/slippers when out of bed    safety round/check completed  Intervention: Prevent Skin Injury  Recent Flowsheet Documentation  Taken 10/31/2023 0808 by Raina Andersen RN  Body Position: dangle, side of bed  Intervention: Prevent and Manage VTE (Venous Thromboembolism) Risk  Recent Flowsheet Documentation  Taken 10/31/2023 0808 by Raina Andersen RN  Activity Management: bedrest  Range of Motion: active ROM (range of motion) encouraged  Intervention: Prevent Infection  Recent Flowsheet Documentation  Taken 10/31/2023 1600 by Raina Andersen RN  Infection Prevention:   hand hygiene promoted   single patient room provided  Taken 10/31/2023 1403 by Raina Andersen RN  Infection Prevention:   hand hygiene promoted   single patient room provided  Taken 10/31/2023 1200 by Raina Andersen RN  Infection Prevention:   hand hygiene promoted   single patient room provided  Taken 10/31/2023 1016 by Raina Andersen RN  Infection Prevention:   hand hygiene promoted   single patient room provided  Taken 10/31/2023 0808 by Raina Andersen RN  Infection Prevention:   hand hygiene promoted   single patient room provided  Goal: Optimal Comfort and Wellbeing  Outcome: Ongoing, Progressing  Intervention: Provide Person-Centered Care  Recent Flowsheet Documentation  Taken 10/31/2023 0808 by Raina Andersen RN  Trust Relationship/Rapport:   care explained   choices provided   emotional support provided   empathic listening provided   questions answered   questions encouraged   reassurance provided   thoughts/feelings acknowledged  Goal: Readiness for Transition of Care  Outcome: Ongoing, Progressing     Problem: Skin Injury Risk Increased  Goal: Skin Health and Integrity  Outcome: Ongoing, Progressing  Intervention: Optimize Skin Protection  Recent Flowsheet Documentation  Taken 10/31/2023 1600 by Raina Andersen RN  Head of Bed (HOB) Positioning: HOB elevated  Taken 10/31/2023 1403 by Raina Andersen RN  Head of Bed (HOB) Positioning: HOB elevated  Taken 10/31/2023 1248 by Raina Andersen, CHRISTIAN  Head  of Bed (HOB) Positioning: HOB elevated  Taken 10/31/2023 1200 by Raina Andersen RN  Head of Bed (HOB) Positioning: HOB elevated  Taken 10/31/2023 1115 by Raina Andersen RN  Head of Bed (HOB) Positioning: HOB elevated  Taken 10/31/2023 1016 by Raina Andersen RN  Head of Bed (HOB) Positioning: HOB elevated  Taken 10/31/2023 1000 by Raina Andersen RN  Head of Bed (HOB) Positioning: HOB elevated  Taken 10/31/2023 0907 by Raina Andersen RN  Head of Bed (HOB) Positioning: HOB elevated  Taken 10/31/2023 0830 by Raina Andersen RN  Head of Bed (HOB) Positioning: HOB elevated  Taken 10/31/2023 0808 by Raina Andersen RN  Pressure Reduction Techniques: heels elevated off bed  Head of Bed (HOB) Positioning: HOB elevated  Pressure Reduction Devices: pressure-redistributing mattress utilized  Taken 10/31/2023 0733 by Raina Andersen RN  Head of Bed ([facility]) Positioning: HOB elevated     Problem: Pain Acute  Goal: Acceptable Pain Control and Functional Ability  Outcome: Ongoing, Progressing  Intervention: Optimize Psychosocial Wellbeing  Recent Flowsheet Documentation  Taken 10/31/2023 0808 by Rania Andersen RN  Diversional Activities: smartphone     Problem: Fall Injury Risk  Goal: Absence of Fall and Fall-Related Injury  Outcome: Ongoing, Progressing  Intervention: Promote Injury-Free Environment  Recent Flowsheet Documentation  Taken 10/31/2023 1600 by Raina Andersen RN  Safety Promotion/Fall Prevention:   nonskid shoes/slippers when out of bed   safety round/check completed  Taken 10/31/2023 1403 by Raina Andersen RN  Safety Promotion/Fall Prevention:   nonskid shoes/slippers when out of bed   safety round/check completed  Taken 10/31/2023 1248 by Raina Andersen RN  Safety Promotion/Fall Prevention:   nonskid shoes/slippers when out of bed   safety round/check completed  Taken 10/31/2023 1200 by Raina Andersen RN  Safety Promotion/Fall Prevention:   nonskid shoes/slippers when out of bed   safety round/check completed  Taken 10/31/2023 1115 by Raina Andersen  RN  Safety Promotion/Fall Prevention:   nonskid shoes/slippers when out of bed   safety round/check completed  Taken 10/31/2023 1016 by Raina Andersen RN  Safety Promotion/Fall Prevention:   nonskid shoes/slippers when out of bed   safety round/check completed  Taken 10/31/2023 1000 by Raina Andersen RN  Safety Promotion/Fall Prevention:   nonskid shoes/slippers when out of bed   safety round/check completed  Taken 10/31/2023 0907 by Raina Andersen RN  Safety Promotion/Fall Prevention:   nonskid shoes/slippers when out of bed   safety round/check completed  Taken 10/31/2023 0830 by Raina Andersen RN  Safety Promotion/Fall Prevention:   nonskid shoes/slippers when out of bed   safety round/check completed  Taken 10/31/2023 0808 by Raina Andersen RN  Safety Promotion/Fall Prevention:   nonskid shoes/slippers when out of bed   safety round/check completed  Taken 10/31/2023 0733 by Raina Andersen RN  Safety Promotion/Fall Prevention:   nonskid shoes/slippers when out of bed   safety round/check completed     Problem: Bleeding (Orthopaedic Fracture)  Goal: Absence of Bleeding  Outcome: Ongoing, Progressing     Problem: Embolism (Orthopaedic Fracture)  Goal: Absence of Embolism Signs and Symptoms  Outcome: Ongoing, Progressing     Problem: Fracture Stabilization and Management (Orthopaedic Fracture)  Goal: Fracture Stability  Outcome: Ongoing, Progressing     Problem: Functional Ability Impaired (Orthopaedic Fracture)  Goal: Optimal Functional Ability  Outcome: Ongoing, Progressing  Intervention: Optimize Functional Ability  Recent Flowsheet Documentation  Taken 10/31/2023 0808 by Raina Andersen RN  Activity Management: bedrest  Positioning/Transfer Devices:   in use   pillows  Range of Motion: active ROM (range of motion) encouraged     Problem: Infection (Orthopaedic Fracture)  Goal: Absence of Infection Signs and Symptoms  Outcome: Ongoing, Progressing  Intervention: Prevent or Manage Infection  Recent Flowsheet Documentation  Taken  10/31/2023 1600 by Raina Andersen RN  Infection Prevention:   hand hygiene promoted   single patient room provided  Taken 10/31/2023 1403 by Raina Andersen RN  Infection Prevention:   hand hygiene promoted   single patient room provided  Taken 10/31/2023 1200 by Raina Andersen RN  Infection Prevention:   hand hygiene promoted   single patient room provided  Taken 10/31/2023 1016 by Raina Andersen RN  Infection Prevention:   hand hygiene promoted   single patient room provided  Taken 10/31/2023 0808 by Raina Andersen RN  Infection Prevention:   hand hygiene promoted   single patient room provided     Problem: Neurovascular Compromise (Orthopaedic Fracture)  Goal: Effective Tissue Perfusion  Outcome: Ongoing, Progressing  Intervention: Prevent or Manage Neurovascular Compromise  Recent Flowsheet Documentation  Taken 10/31/2023 0808 by Raina Andersen RN  Compartment Syndrome Management: active flexion/extension encouraged     Problem: Pain (Orthopaedic Fracture)  Goal: Acceptable Pain Control  Outcome: Ongoing, Progressing  Intervention: Manage Acute Orthopaedic-Related Pain  Recent Flowsheet Documentation  Taken 10/31/2023 0808 by Raina Andersen RN  Diversional Activities: smartphone     Problem: Respiratory Compromise (Orthopaedic Fracture)  Goal: Effective Oxygenation and Ventilation  Outcome: Ongoing, Progressing   Goal Outcome Evaluation:         Pt is alert and orient x4.  VS WNL for pt.  Pain managed with prn pain medictions

## 2023-10-31 NOTE — THERAPY EVALUATION
Acute Care - Physical Therapy Initial Evaluation  BOOGIE Yoon     Patient Name: Colton Chiang  : 1960  MRN: [id number]  Today's Date: 10/31/2023      Visit Dx:     ICD-10-CM ICD-9-CM   1. Closed intertrochanteric fracture of hip, right, initial encounter  S72.141A 820.21   2. Fall on same level from slipping, tripping or stumbling, initial encounter  W01.0XXA E885.9   3. Difficulty walking  R26.2 719.7     Patient Active Problem List   Diagnosis    HTN (hypertension)    ED (erectile dysfunction)    History of CVA (cerebrovascular accident)    Class 1 obesity due to excess calories with serious comorbidity and body mass index (BMI) of 32.0 to 32.9 in adult    Primary osteoarthritis involving multiple joints    Hyperlipidemia LDL goal <100    Seasonal allergies    Acquired hypothyroidism    History of prostate cancer    Cigarette nicotine dependence with nicotine-induced disorder    Chronic diastolic (congestive) heart failure    Iron deficiency anemia    RLS (restless legs syndrome)    Erysipelas    Prostate cancer    Right leg weakness    Fall    Closed intertrochanteric fracture of hip, right, initial encounter     Past Medical History:   Diagnosis Date    Allergies     Arthritis     Broken bones     Cracked    CHF (congestive heart failure)     Chronic back pain     Deafness, bilateral     Depression     HBP (high blood pressure)     Heart disease     High cholesterol     History of radiation therapy     Hypothyroid 02/10/2017    Prostate cancer 2016    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, bilateral     Stroke     Thyroid disorder      Past Surgical History:   Procedure Laterality Date    COLONOSCOPY      HIP TROCHANTERIC NAILING WITH INTRAMEDULLARY HIP SCREW Right 10/30/2023    Procedure: HIP TROCHANTERIC NAILING WITH INTRAMEDULLARY HIP SCREW;  Surgeon: Sandra Wolfe MD;  Location: [facility];  Service: Orthopedics;  Laterality: Right;    PROSTATE BIOPSY       PT Assessment (last 12 hours)       PT Evaluation  and Treatment       Row Name 10/31/23 1052          Physical Therapy Time and Intention    Subjective Information no complaints  -DP     Document Type evaluation  -DP     Mode of Treatment individual therapy;physical therapy  -DP     Patient Effort good  -DP     Symptoms Noted During/After Treatment fatigue;increased pain  -DP       Row Name 10/31/23 1052          General Information    Patient Profile Reviewed yes  -DP     Patient Observations alert;cooperative;agree to therapy  -DP     Prior Level of Function independent:;gait;transfer;bed mobility;ADL's  -DP     Equipment Currently Used at Home none  -DP     Existing Precautions/Restrictions fall;weight bearing  RLE WBAT  -DP     Barriers to Rehab none identified  -DP       Row Name 10/31/23 1052          Living Environment    Current Living Arrangements home  -DP     Home Accessibility stairs to enter home  -DP     People in Home spouse  Wife  -DP     Primary Care Provided by self  -DP       Row Name 10/31/23 1052          Home Main Entrance    Number of Stairs, Main Entrance three  -DP       Row Name 10/31/23 1052          Home Use of Assistive/Adaptive Equipment    Equipment Currently Used at Home none  -DP       Row Name 10/31/23 1052          Cognition    Orientation Status (Cognition) oriented x 4  -DP     Personal Safety Interventions gait belt;nonskid shoes/slippers when out of bed  -DP       Row Name 10/31/23 1052          Range of Motion Comprehensive    General Range of Motion bilateral lower extremity ROM WFL  -DP       Row Name 10/31/23 1052          Strength (Manual Muscle Testing)    Strength (Manual Muscle Testing) right lower extremity  Strength 3+/5 except R hip 2-/5  -DP       Row Name 10/31/23 1052          Mobility    Extremity Weight-bearing Status right lower extremity  -DP     Right Lower Extremity (Weight-bearing Status) weight-bearing as tolerated (WBAT)  -DP       Row Name 10/31/23 1052          Bed Mobility    Comment, (Bed Mobility)  Not tested - pt sitting EOB upon entry  -DP       Row Name 10/31/23 1052          Transfers    Transfers sit-stand transfer  -DP     Maintains Weight-bearing Status (Transfers) able to maintain  -DP       Row Name 10/31/23 1052          Sit-Stand Transfer    Sit-Stand [place] (Transfers) moderate assist (50% patient effort)  -DP     Assistive Device (Sit-Stand Transfers) walker, front-wheeled  -DP       Row Name 10/31/23 1052          Gait/Stairs (Locomotion)    Gait/Stairs Locomotion gait/ambulation assistive device  -DP     [place] Level (Gait) minimum assist (75% patient effort)  -DP     Assistive Device (Gait) walker, front-wheeled  -DP     Patient was able to Ambulate yes  -DP     Distance in Feet (Gait) 12  -DP       Row Name 10/31/23 1052          Safety Issues, Functional Mobility    Impairments Affecting Function (Mobility) balance;endurance/activity tolerance;strength  -DP       Row Name 10/31/23 1052          Balance    Balance Assessment standing dynamic balance  -DP     Dynamic Standing Balance minimal assist  -DP     Position/Device Used, Standing Balance supported;walker, front-wheeled  -DP       Row Name             Wound 10/30/23 0047 Right distal leg Other (comment)    Wound - Properties Group Placement Date: 10/30/23  -RK Placement Time: 0047  -RK Present on Original Admission: Y  -RK Side: Right  -RK Orientation: distal  -RK Location: leg  -RK Primary Wound Type: Other  -RK    Retired Wound - Properties Group Placement Date: 10/30/23  -RK Placement Time: 0047  -RK Present on Original Admission: Y  -RK Side: Right  -RK Orientation: distal  -RK Location: leg  -RK Primary Wound Type: Other  -RK    Retired Wound - Properties Group Date first assessed: 10/30/23  -RK Time first assessed: 0047  -RK Present on Original Admission: Y  -RK Side: Right  -RK Location: leg  -RK Primary Wound Type: Other  -RK      Row Name             Wound 10/30/23 1404 Right anterior greater trochanter Incision     Wound - Properties Group Placement Date: 10/30/23  - Placement Time: 1404  -MH Side: Right  -MH Orientation: anterior  -MH Location: greater trochanter  -MH Primary Wound Type: Incision  -MH    Retired Wound - Properties Group Placement Date: 10/30/23  - Placement Time: 1404  -MH Side: Right  -MH Orientation: anterior  -MH Location: greater trochanter  -MH Primary Wound Type: Incision  -MH    Retired Wound - Properties Group Date first assessed: 10/30/23  -MH Time first assessed: 1404  -MH Side: Right  -MH Location: greater trochanter  -MH Primary Wound Type: Incision  -MH      Row Name 10/31/23 1052          Plan of Care Review    Plan of Care Reviewed With patient;spouse  -DP     Outcome Evaluation Pt presents with impairments in strength, balance, and endurance/activity tolerance affecting functional mobility and ambulation. He will benefit from inpatient PT services, as well as placement in a rehabilitation hospital following discharge.  -DP       Row Name 10/31/23 1052          Positioning and Restraints    Pre-Treatment Position other (comment)  Sitting unsupported EOB  -DP     Post Treatment Position chair  -DP     In Chair call light within reach;encouraged to call for assist;exit alarm on;with family/caregiver  -DP       Row Name 10/31/23 1058          Therapy Assessment/Plan (PT)    Rehab Potential (PT) good, to achieve stated therapy goals  -DP     Criteria for Skilled Interventions Met (PT) yes;meets criteria;skilled treatment is necessary  -DP     Therapy Frequency (PT) daily  -DP     Predicted Duration of Therapy Intervention (PT) 10 days  -DP     Problem List (PT) problems related to;balance;mobility;strength  -DP     Activity Limitations Related to Problem List (PT) unable to ambulate safely;unable to transfer safely  -DP       Row Name 10/31/23 1052          PT Evaluation Complexity    History, PT Evaluation Complexity no personal factors and/or comorbidities  -DP     Examination of Body Systems  (PT Eval Complexity) total of 4 or more elements  -DP     Clinical Presentation (PT Evaluation Complexity) stable  -DP     Clinical Decision Making (PT Evaluation Complexity) low complexity  -DP     Overall Complexity (PT Evaluation Complexity) low complexity  -DP       Row Name 10/31/23 1052          Therapy Plan Review/Discharge Plan (PT)    Therapy Plan Review (PT) evaluation/treatment results reviewed;care plan/treatment goals reviewed;patient;spouse/significant other  -DP       Row Name 10/31/23 1052          Physical Therapy Goals    Transfer Goal Selection (PT) transfer, PT goal 1  -DP     Gait Training Goal Selection (PT) gait training, PT goal 1  -DP       Row Name 10/31/23 1052          Transfer Goal 1 (PT)    Activity/Assistive Device (Transfer Goal 1, PT) sit-to-stand/stand-to-sit;walker, rolling  -DP     [place] Level/Cues Needed (Transfer Goal 1, PT) contact guard required  -DP     Time Frame (Transfer Goal 1, PT) 10 days  -DP       Row Name 10/31/23 1052          Gait Training Goal 1 (PT)    Activity/Assistive Device (Gait Training Goal 1, PT) gait (walking locomotion);assistive device use;decrease fall risk;improve balance and speed;increase endurance/gait distance;walker, rolling  -DP     [place] Level (Gait Training Goal 1, PT) standby assist  -DP     Distance (Gait Training Goal 1, PT) 300  -DP     Time Frame (Gait Training Goal 1, PT) 10 days  -DP               User Key  (r) = Recorded By, (t) = Taken By, (c) = Cosigned By      Initials Name Provider Type    RK Malia Chavez RN Registered Nurse    Julian Pascual RN Registered Nurse    Aviva Cornejo, PT Physical Therapist                      PT Recommendation and Plan  Anticipated Discharge Disposition (PT): inpatient rehabilitation facility  Planned Therapy Interventions (PT): balance training, bed mobility training, gait training, strengthening, transfer training  Therapy Frequency (PT): daily  Plan of Care Reviewed  With: patient, spouse  Outcome Evaluation: Pt presents with impairments in strength, balance, and endurance/activity tolerance affecting functional mobility and ambulation. He will benefit from inpatient PT services, as well as placement in a rehabilitation hospital following discharge.   Outcome Measures       Row Name 10/31/23 1100             How much help from another person do you currently need...    Turning from your back to your side while in flat bed without using bedrails? 4  -DP      Moving from lying on back to sitting on the side of a flat bed without bedrails? 4  -DP      Moving to and from a bed to a chair (including a wheelchair)? 3  -DP      Standing up from a chair using your arms (e.g., wheelchair, bedside chair)? 2  -DP      Climbing 3-5 steps with a railing? 2  -DP      To walk in hospital room? 3  -DP      AM-PAC 6 Clicks Score (PT) 18  -DP      Highest level of mobility 6 --> Walked 10 steps or more  -DP                User Key  (r) = Recorded By, (t) = Taken By, (c) = Cosigned By      Initials Name Provider Type    Aviva Cornejo, PT Physical Therapist                     Time Calculation:    PT Charges       Row Name 10/31/23 1103             Time Calculation    PT Received On 10/31/23  -DP      PT Goal Re-Cert Due Date 11/09/23  -DP         Untimed Charges    PT Eval/Re-eval Minutes 45  -DP         Total Minutes    Untimed Charges Total Minutes 45  -DP       Total Minutes 45  -DP                User Key  (r) = Recorded By, (t) = Taken By, (c) = Cosigned By      Initials Name Provider Type    Aviva Cornejo, PT Physical Therapist                  Therapy Charges for Today       Code Description Service Date Service Provider Modifiers Qty    [id number]  PT EVAL LOW COMPLEXITY 4 10/31/2023 Aviva Morales, PT GP 1            PT G-Codes  AM-PAC 6 Clicks Score (PT): 18    Aviva Morales PT  10/31/2023

## 2023-10-31 NOTE — PLAN OF CARE
Goal Outcome Evaluation:  Plan of Care Reviewed With: patient        Progress: improving  Outcome Evaluation: Pt alert and able to make needs known. Pain controlled with PRN Norco. Pt urinating without difficulty. Pt awaiting PT eval for rehab reccs.

## 2023-10-31 NOTE — THERAPY EVALUATION
Patient Name: Colton Chiang  : 1960    MRN: [id number]                              Today's Date: 10/31/2023       Admit Date: 10/29/2023    Visit Dx:     ICD-10-CM ICD-9-CM   1. Closed intertrochanteric fracture of hip, right, initial encounter  S72.141A 820.21   2. Fall on same level from slipping, tripping or stumbling, initial encounter  W01.0XXA E885.9   3. Difficulty walking  R26.2 719.7   4. Decreased activities of daily living (ADL)  Z78.9 V49.89     Patient Active Problem List   Diagnosis    HTN (hypertension)    ED (erectile dysfunction)    History of CVA (cerebrovascular accident)    Class 1 obesity due to excess calories with serious comorbidity and body mass index (BMI) of 32.0 to 32.9 in adult    Primary osteoarthritis involving multiple joints    Hyperlipidemia LDL goal <100    Seasonal allergies    Acquired hypothyroidism    History of prostate cancer    Cigarette nicotine dependence with nicotine-induced disorder    Chronic diastolic (congestive) heart failure    Iron deficiency anemia    RLS (restless legs syndrome)    Erysipelas    Prostate cancer    Right leg weakness    Fall    Closed intertrochanteric fracture of hip, right, initial encounter     Past Medical History:   Diagnosis Date    Allergies     Arthritis     Broken bones     Cracked    CHF (congestive heart failure)     Chronic back pain     Deafness, bilateral     Depression     HBP (high blood pressure)     Heart disease     High cholesterol     History of radiation therapy     Hypothyroid 02/10/2017    Prostate cancer 2016    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, bilateral     Stroke     Thyroid disorder      Past Surgical History:   Procedure Laterality Date    COLONOSCOPY      HIP TROCHANTERIC NAILING WITH INTRAMEDULLARY HIP SCREW Right 10/30/2023    Procedure: HIP TROCHANTERIC NAILING WITH INTRAMEDULLARY HIP SCREW;  Surgeon: Sandra Wolfe MD;  Location: [facility] MAIN OR;  Service: Orthopedics;  Laterality: Right;    PROSTATE BIOPSY         General Information       Row Name 10/31/23 1303          OT Time and Intention    Document Type evaluation  -ES     Mode of Treatment individual therapy;occupational therapy  -ES       Row Name 10/31/23 1303          General Information    Patient Profile Reviewed yes  -ES     Prior Level of Function independent:;ADL's;all household mobility;community mobility;driving;work  Patient is independent with B and IADLs at baseline. Patient is employed as . No device for functional mobility. Tub/shower combo, standing shower completion. [place] in stance. No home O2 use. x2 falls in last three months.  -ES     Existing Precautions/Restrictions fall;weight bearing  -ES     Barriers to Rehab none identified  -ES       Row Name 10/31/23 1303          Occupational Profile    Reason for Services/Referral (Occupational Profile) Patient is 63 yr old male admitted to [facility] on 10/29/2023 after mechanical fall resulting in right intertrochanteric femur fracture.  Patient is postop day 1 right hip nailing with IM screw, weightbearing as tolerated right lower extremity. OT evaluation and treatment ordered d/t recent decline in ADLs/transfer ability and discharge planning recommendations. No previous OT services for current condition.  -ES       Row Name 10/31/23 1303          Living Environment    People in Home spouse  -ES       Row Name 10/31/23 1303          Home Main Entrance    Number of Stairs, Main Entrance three  -ES       Row Name 10/31/23 1303          Cognition    Orientation Status (Cognition) oriented x 4  Patient pleasant and cooperative, agreeable to therapy evaluation.  -ES       Row Name 10/31/23 1303          Safety Issues, Functional Mobility    Safety Issues Affecting Function (Mobility) awareness of need for assistance;insight into deficits/self-awareness  -ES     Impairments Affecting Function (Mobility) balance;endurance/activity tolerance;strength  -ES               User Key  (r)  = Recorded By, (t) = Taken By, (c) = Cosigned By      Initials Name Provider Type    ES Rufina Burk, OTR/L, CSRS Occupational Therapist                     Mobility/ADL's       Row Name 10/31/23 1308          Bed Mobility    Bed Mobility bed mobility (all) activities  -ES     All Activities, [place] (Bed Mobility) not tested  -ES     Comment, (Bed Mobility) not tested. Patient met seated in recliner at therapy arrival to room  -       Row Name 10/31/23 1308          Transfers    Transfers sit-stand transfer;stand-sit transfer;toilet transfer  -ES       Row Name 10/31/23 1308          Sit-Stand Transfer    Sit-Stand [place] (Transfers) moderate assist (50% patient effort);1 person assist  -ES     Assistive Device (Sit-Stand Transfers) walker, front-wheeled  -ES       Row Name 10/31/23 1308          Stand-Sit Transfer    Stand-Sit [place] (Transfers) moderate assist (50% patient effort);1 person assist  -ES     Assistive Device (Stand-Sit Transfers) walker, front-wheeled  -ES       Row Name 10/31/23 1308          Toilet Transfer    Type (Toilet Transfer) sit-stand;stand-sit  -ES     [place] Level (Toilet Transfer) moderate assist (50% patient effort);1 person assist  -ES     Assistive Device (Toilet Transfer) walker, front-wheeled  -ES       Row Name 10/31/23 1308          Activities of Daily Living    BADL Assessment/Intervention bathing;upper body dressing;lower body dressing;grooming;feeding;toileting  -ES       Row Name 10/31/23 1308          Mobility    Extremity Weight-bearing Status right lower extremity  -ES     Right Lower Extremity (Weight-bearing Status) weight-bearing as tolerated (WBAT)  -ES       Row Name 10/31/23 1308          Bathing Assessment/Intervention    [place] Level (Bathing) bathing skills;minimum assist (75% patient effort)  -ES       Row Name 10/31/23 1308          Upper Body Dressing Assessment/Training    [place] Level (Upper Body Dressing) upper body  dressing skills;set up  -ES       Row Name 10/31/23 1308          Lower Body Dressing Assessment/Training    [place] Level (Lower Body Dressing) lower body dressing skills;maximum assist (25% patient effort)  -ES       Row Name 10/31/23 1308          Grooming Assessment/Training    [place] Level (Grooming) grooming skills;set up  -ES       Row Name 10/31/23 1308          Self-Feeding Assessment/Training    [place] Level (Feeding) feeding skills;set up  -ES       Row Name 10/31/23 1308          Toileting Assessment/Training    [place] Level (Toileting) toileting skills;moderate assist (50% patient effort)  -ES               User Key  (r) = Recorded By, (t) = Taken By, (c) = Cosigned By      Initials Name Provider Type    ES Rufina Burk, OTR/L, CSRS Occupational Therapist                   Obj/Interventions       Row Name 10/31/23 1311          Sensory Assessment (Somatosensory)    Sensory Assessment (Somatosensory) sensation intact  -ES       Row Name 10/31/23 1311          Vision Assessment/Intervention    Visual Impairment/Limitations WFL  -ES       Row Name 10/31/23 1311          Range of Motion Comprehensive    General Range of Motion bilateral upper extremity ROM WNL  -ES       Row Name 10/31/23 1311          Strength Comprehensive (MMT)    General Manual Muscle Testing (MMT) Assessment lower extremity strength deficits identified  -ES     Comment, General Manual Muscle Testing (MMT) Assessment BUEs 4/5  -ES       Row Name 10/31/23 1311          Motor Skills    Motor Skills functional endurance  -ES     Functional Endurance fair minus.  -ES       Row Name 10/31/23 1311          Balance    Balance Assessment sitting dynamic balance;standing dynamic balance  -ES     Dynamic Sitting Balance standby assist  -ES     Position, Sitting Balance unsupported;sitting in chair  -ES     Dynamic Standing Balance moderate assist;1-person assist  -ES     Position/Device Used, Standing Balance  supported;walker, front-wheeled  -ES               User Key  (r) = Recorded By, (t) = Taken By, (c) = Cosigned By      Initials Name Provider Type    Rufina Christopher, OTR/L, CSRS Occupational Therapist                   Goals/Plan       Row Name 10/31/23 1314          Transfer Goal 1 (OT)    Activity/Assistive Device (Transfer Goal 1, OT) transfers, all;walker, rolling  -ES     [place] Level/Cues Needed (Transfer Goal 1, OT) modified independence  -ES     Time Frame (Transfer Goal 1, OT) long term goal (LTG);10 days  -ES       Row Name 10/31/23 1314          Bathing Goal 1 (OT)    Activity/Device (Bathing Goal 1, OT) bathing skills, all  -ES     [place] Level/Cues Needed (Bathing Goal 1, OT) modified independence  -ES     Time Frame (Bathing Goal 1, OT) long term goal (LTG);10 days  -ES       Row Name 10/31/23 1314          Dressing Goal 1 (OT)    Activity/Device (Dressing Goal 1, OT) dressing skills, all  -ES     [place]/Cues Needed (Dressing Goal 1, OT) modified independence  -ES     Time Frame (Dressing Goal 1, OT) long term goal (LTG);10 days  -ES       Row Name 10/31/23 1314          Toileting Goal 1 (OT)    Activity/Device (Toileting Goal 1, OT) toileting skills, all  -ES     [place] Level/Cues Needed (Toileting Goal 1, OT) modified independence  -ES     Time Frame (Toileting Goal 1, OT) long term goal (LTG);10 days  -ES       Row Name 10/31/23 1314          Grooming Goal 1 (OT)    Activity/Device (Grooming Goal 1, OT) grooming skills, all  -ES     [place] (Grooming Goal 1, OT) modified independence  -ES     Time Frame (Grooming Goal 1, OT) long term goal (LTG);10 days  -ES       Row Name 10/31/23 1314          Problem Specific Goal 1 (OT)    Problem Specific Goal 1 (OT) Patient will demonstrate good graded dynamic balance in preperation for independent ADL routine completion at time of discharge  -ES     Time Frame (Problem Specific Goal 1, OT) long term goal (LTG);10 days  -ES        Row Name 10/31/23 1314          Therapy Assessment/Plan (OT)    Planned Therapy Interventions (OT) activity tolerance training;BADL retraining;functional balance retraining;occupation/activity based interventions;patient/caregiver education/training;transfer/mobility retraining  -ES               User Key  (r) = Recorded By, (t) = Taken By, (c) = Cosigned By      Initials Name Provider Type    ES Rufina Burk, OTR/L, CSRS Occupational Therapist                   Clinical Impression       Row Name 10/31/23 1311          Plan of Care Review    Plan of Care Reviewed With patient  -ES     Progress no change  -ES     Outcome Evaluation Patient has experienced decline in function from baseline status, presenting w/ deficits related to balance, strength, safety awareness, transfers and mobility that impede patient independence with activities of daily living.  Patient would benefit from skilled Occupational Therapy intervention to maxamize patient safety, and promote return to baseline independence.  -ES       Row Name 10/31/23 1311          Therapy Assessment/Plan (OT)    Rehab Potential (OT) good, to achieve stated therapy goals  -ES     Criteria for Skilled Therapeutic Interventions Met (OT) yes;meets criteria;skilled treatment is necessary  -ES     Therapy Frequency (OT) 5 times/wk  -ES       Row Name 10/31/23 1311          Therapy Plan Review/Discharge Plan (OT)    Anticipated Discharge Disposition (OT) inpatient rehabilitation facility;skilled nursing facility  -ES       Row Name 10/31/23 1311          Vital Signs    O2 Delivery Pre Treatment room air  -ES     O2 Delivery Intra Treatment room air  -ES     O2 Delivery Post Treatment room air  -ES       Row Name 10/31/23 1311          Positioning and Restraints    Pre-Treatment Position sitting in chair/recliner  -ES     Post Treatment Position chair  -ES     In Chair reclined;call light within reach;encouraged to call for assist;with family/caregiver  Patient left  seated in recliner at end of session. Tray table and call light within patient reach. All needs met. Chair alarm placed and activated for patient safety.  -ES               User Key  (r) = Recorded By, (t) = Taken By, (c) = Cosigned By      Initials Name Provider Type    Rufina Christopher, OTR/L, CSRS Occupational Therapist                   Outcome Measures       Row Name 10/31/23 1317          How much help from another is currently needed...    Putting on and taking off regular lower body clothing? 2  -ES     Bathing (including washing, rinsing, and drying) 2  -ES     Toileting (which includes using toilet bed pan or urinal) 2  -ES     Putting on and taking off regular upper body clothing 3  -ES     Taking care of personal grooming (such as brushing teeth) 3  -ES     Eating meals 4  -ES     AM-PAC 6 Clicks Score (OT) 16  -ES       Row Name 10/31/23 1100 10/31/23 0808       How much help from another person do you currently need...    Turning from your back to your side while in flat bed without using bedrails? 4  -DP 2  -AS    Moving from lying on back to sitting on the side of a flat bed without bedrails? 4  -DP 2  -AS    Moving to and from a bed to a chair (including a wheelchair)? 3  -DP 1  -AS    Standing up from a chair using your arms (e.g., wheelchair, bedside chair)? 2  -DP 1  -AS    Climbing 3-5 steps with a railing? 2  -DP 1  -AS    To walk in hospital room? 3  -DP 1  -AS    AM-PAC 6 Clicks Score (PT) 18  -DP 8  -AS    Highest level of mobility 6 --> Walked 10 steps or more  -DP 3 --> Sat at edge of bed  -AS      Row Name 10/31/23 1317          Functional Assessment    Outcome Measure Options AM-PAC 6 Clicks Daily Activity (OT);Optimal Instrument  -ES       Row Name 10/31/23 1317          Optimal Instrument    Optimal Instrument Optimal - 3  -ES     Bending/Stooping 3  -ES     Balancing 3  -ES     Standing 3  -ES     From the list, choose the 3 activities you would most like to be able to do without any  difficulty Bending/stooping;Standing;Balancing  -ES     Total Score Optimal - 3 9  -ES               User Key  (r) = Recorded By, (t) = Taken By, (c) = Cosigned By      Initials Name Provider Type    Aviva Cornejo, PT Physical Therapist    Rufina Christopher, OTR/L, CSRS Occupational Therapist    Raina Stratton RN Registered Nurse                      OT Recommendation and Plan  Planned Therapy Interventions (OT): activity tolerance training, BADL retraining, functional balance retraining, occupation/activity based interventions, patient/caregiver education/training, transfer/mobility retraining  Therapy Frequency (OT): 5 times/wk  Plan of Care Review  Plan of Care Reviewed With: patient  Progress: no change  Outcome Evaluation: Patient has experienced decline in function from baseline status, presenting w/ deficits related to balance, strength, safety awareness, transfers and mobility that impede patient independence with activities of daily living.  Patient would benefit from skilled Occupational Therapy intervention to maxamize patient safety, and promote return to baseline independence.     Time Calculation:   Evaluation Complexity (OT)  Review Occupational Profile/Medical/Therapy History Complexity: brief/low complexity  Assessment, Occupational Performance/Identification of Deficit Complexity: 3-5 performance deficits  Clinical Decision Making Complexity (OT): problem focused assessment/low complexity  Overall Complexity of Evaluation (OT): low complexity     Time Calculation- OT       Row Name 10/31/23 1319             Time Calculation- OT    OT Received On 10/31/23  -ES      OT Goal Re-Cert Due Date 11/09/23  -ES         Untimed Charges    OT Eval/Re-eval Minutes 34  -ES         Total Minutes    Untimed Charges Total Minutes 34  -ES       Total Minutes 34  -ES                User Key  (r) = Recorded By, (t) = Taken By, (c) = Cosigned By      Initials Name Provider Type    Rufina Christopher, OTR/L, CSRS  Occupational Therapist                  Therapy Charges for Today       Code Description Service Date Service Provider Modifiers Qty    [id number] HC OT EVAL LOW COMPLEXITY 3 10/31/2023 Rufina Burk, OTR/L, CSRS GO 1                 MARY Brown/L, CSRS  10/31/2023

## 2023-11-01 LAB
ANION GAP SERPL CALCULATED.3IONS-SCNC: 7.2 MMOL/L (ref 5–15)
BUN SERPL-MCNC: 11 MG/DL (ref 8–23)
BUN/CREAT SERPL: 14.7 (ref 7–25)
CALCIUM SPEC-SCNC: 8.5 MG/DL (ref 8.6–10.5)
CHLORIDE SERPL-SCNC: 98 MMOL/L (ref 98–107)
CO2 SERPL-SCNC: 30.8 MMOL/L (ref 22–29)
CREAT SERPL-MCNC: 0.75 MG/DL (ref 0.76–1.27)
DEPRECATED RDW RBC AUTO: 42.5 FL (ref 37–54)
EGFRCR SERPLBLD CKD-EPI 2021: 100.1 ML/MIN/1.73
ERYTHROCYTE [DISTWIDTH] IN BLOOD BY AUTOMATED COUNT: 13.5 % (ref 12.3–15.4)
GLUCOSE SERPL-MCNC: 99 MG/DL (ref 65–99)
HCT VFR BLD AUTO: 36.2 % (ref 37.5–51)
HGB BLD-MCNC: 11.5 G/DL (ref 13–17.7)
MAGNESIUM SERPL-MCNC: 1.8 MG/DL (ref 1.6–2.4)
MCH RBC QN AUTO: 27.4 PG (ref 26.6–33)
MCHC RBC AUTO-ENTMCNC: 31.8 G/DL (ref 31.5–35.7)
MCV RBC AUTO: 86.2 FL (ref 79–97)
PLATELET # BLD AUTO: 220 10*3/MM3 (ref 140–450)
PMV BLD AUTO: 9.5 FL (ref 6–12)
POTASSIUM SERPL-SCNC: 4.2 MMOL/L (ref 3.5–5.2)
RBC # BLD AUTO: 4.2 10*6/MM3 (ref 4.14–5.8)
SODIUM SERPL-SCNC: 136 MMOL/L (ref 136–145)
WBC NRBC COR # BLD: 8.58 10*3/MM3 (ref 3.4–10.8)

## 2023-11-01 PROCEDURE — 25010000002 ENOXAPARIN PER 10 MG: Performed by: ORTHOPAEDIC SURGERY

## 2023-11-01 PROCEDURE — 94799 UNLISTED PULMONARY SVC/PX: CPT

## 2023-11-01 PROCEDURE — 80048 BASIC METABOLIC PNL TOTAL CA: CPT | Performed by: INTERNAL MEDICINE

## 2023-11-01 PROCEDURE — 97116 GAIT TRAINING THERAPY: CPT

## 2023-11-01 PROCEDURE — 85027 COMPLETE CBC AUTOMATED: CPT | Performed by: INTERNAL MEDICINE

## 2023-11-01 PROCEDURE — 97530 THERAPEUTIC ACTIVITIES: CPT

## 2023-11-01 PROCEDURE — 97535 SELF CARE MNGMENT TRAINING: CPT

## 2023-11-01 PROCEDURE — 83735 ASSAY OF MAGNESIUM: CPT | Performed by: INTERNAL MEDICINE

## 2023-11-01 PROCEDURE — 97110 THERAPEUTIC EXERCISES: CPT

## 2023-11-01 RX ADMIN — LIOTHYRONINE SODIUM 10 MCG: 5 TABLET ORAL at 06:27

## 2023-11-01 RX ADMIN — FERROUS SULFATE TAB 325 MG (65 MG ELEMENTAL FE) 325 MG: 325 (65 FE) TAB at 08:12

## 2023-11-01 RX ADMIN — Medication 10 ML: at 21:08

## 2023-11-01 RX ADMIN — Medication 10 ML: at 08:12

## 2023-11-01 RX ADMIN — FAMOTIDINE 40 MG: 20 TABLET, FILM COATED ORAL at 08:11

## 2023-11-01 RX ADMIN — HYDROCODONE BITARTRATE AND ACETAMINOPHEN 2 TABLET: 7.5; 325 TABLET ORAL at 08:11

## 2023-11-01 RX ADMIN — NICOTINE 1 PATCH: 21 PATCH, EXTENDED RELEASE TRANSDERMAL at 19:50

## 2023-11-01 RX ADMIN — LEVOTHYROXINE SODIUM 274 MCG: 137 TABLET ORAL at 06:27

## 2023-11-01 RX ADMIN — HYDROCODONE BITARTRATE AND ACETAMINOPHEN 2 TABLET: 7.5; 325 TABLET ORAL at 21:07

## 2023-11-01 RX ADMIN — METOPROLOL SUCCINATE 25 MG: 25 TABLET, EXTENDED RELEASE ORAL at 08:12

## 2023-11-01 NOTE — THERAPY TREATMENT NOTE
Patient Name: Colton Chiang  : 1960    MRN: [id number]                              Today's Date: 2023       Admit Date: 10/29/2023    Visit Dx:     ICD-10-CM ICD-9-CM   1. Closed intertrochanteric fracture of hip, right, initial encounter  S72.141A 820.21   2. Fall on same level from slipping, tripping or stumbling, initial encounter  W01.0XXA E885.9   3. Difficulty walking  R26.2 719.7   4. Decreased activities of daily living (ADL)  Z78.9 V49.89   5. Chronic obstructive pulmonary disease, unspecified COPD type  J44.9 496     Patient Active Problem List   Diagnosis    HTN (hypertension)    ED (erectile dysfunction)    History of CVA (cerebrovascular accident)    Class 1 obesity due to excess calories with serious comorbidity and body mass index (BMI) of 32.0 to 32.9 in adult    Primary osteoarthritis involving multiple joints    Hyperlipidemia LDL goal <100    Seasonal allergies    Acquired hypothyroidism    History of prostate cancer    Cigarette nicotine dependence with nicotine-induced disorder    Chronic diastolic (congestive) heart failure    Iron deficiency anemia    RLS (restless legs syndrome)    Erysipelas    Prostate cancer    Right leg weakness    Fall    Closed intertrochanteric fracture of hip, right, initial encounter     Past Medical History:   Diagnosis Date    Allergies     Arthritis     Broken bones     Cracked    CHF (congestive heart failure)     Chronic back pain     Deafness, bilateral     Depression     HBP (high blood pressure)     Heart disease     High cholesterol     History of radiation therapy     Hypothyroid 02/10/2017    Prostate cancer 2016    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, bilateral     Stroke     Thyroid disorder      Past Surgical History:   Procedure Laterality Date    COLONOSCOPY      HIP TROCHANTERIC NAILING WITH INTRAMEDULLARY HIP SCREW Right 10/30/2023    Procedure: HIP TROCHANTERIC NAILING WITH INTRAMEDULLARY HIP SCREW;  Surgeon: Sandra Wolfe MD;  Location:   [facility];  Service: Orthopedics;  Laterality: Right;    PROSTATE BIOPSY        General Information       Row Name 11/01/23 1251          OT Time and Intention    Document Type therapy note (daily note)  -     Mode of Treatment individual therapy;occupational therapy  -       Row Name 11/01/23 1251          General Information    Patient Profile Reviewed yes  -     Existing Precautions/Restrictions fall;weight bearing  -     Barriers to Rehab none identified  -       Row Name 11/01/23 1251          Living Environment    People in Home spouse  -       Row Name 11/01/23 1251          Home Main Entrance    Number of Stairs, Main Entrance three  -       Row Name 11/01/23 1251          Cognition    Orientation Status (Cognition) oriented x 4  -       Row Name 11/01/23 1251          Safety Issues, Functional Mobility    Impairments Affecting Function (Mobility) balance;endurance/activity tolerance;strength  -               User Key  (r) = Recorded By, (t) = Taken By, (c) = Cosigned By      Initials Name Provider Type     Lisette Evans OT Occupational Therapist                     Mobility/ADL's       Row Name 11/01/23 1252          Bed Mobility    Comment, (Bed Mobility) patient sittin in recliner upon OT arrival in the room. Agreeable to adls.  -       Row Name 11/01/23 1252          Transfers    Transfers sit-stand transfer;bed-chair transfer  -       Row Name 11/01/23 1252          Bed-Chair Transfer    Bed-Chair [place] (Transfers) contact guard;minimum assist (75% patient effort);1 person assist;verbal cues;1 person to manage equipment  -     Assistive Device (Bed-Chair Transfers) walker, front-wheeled  -     Comment, (Bed-Chair Transfer) patient performed recliner to/from EOB transfer with CGA/min A and rolling walker with cues for safety and technique.  -       Row Name 11/01/23 1252          Sit-Stand Transfer    Sit-Stand [place] (Transfers) minimum assist (75% patient  effort);1 person assist;contact guard;verbal cues  -     Assistive Device (Sit-Stand Transfers) walker, front-wheeled  -     Comment, (Sit-Stand Transfer) Patient was CGA/min A with rolling walker for sit to/from stand x 4 with cues for safety and technique.  -[facility] Name 11/01/23 1252          Activities of Daily Living    BADL Assessment/Intervention bathing;lower body dressing  -AC       Row Name 11/01/23 1252          Mobility    Extremity Weight-bearing Status right lower extremity  -     Right Lower Extremity (Weight-bearing Status) weight-bearing as tolerated (WBAT)  -[facility] Name 11/01/23 1252          Bathing Assessment/Intervention    [place] Level (Bathing) bathing skills;lower body;minimum assist (75% patient effort);verbal cues  -AC     Position (Bathing) unsupported sitting;supported standing  -[facility] Name 11/01/23 1252          Lower Body Dressing Assessment/Training    [place] Level (Lower Body Dressing) lower body dressing skills;doff;don;pants/bottoms;socks;minimum assist (75% patient effort);verbal cues  -AC     Position (Lower Body Dressing) unsupported sitting;supported standing  -               User Key  (r) = Recorded By, (t) = Taken By, (c) = Cosigned By      Initials Name Provider Type     Lisette Evans OT Occupational Therapist                   Obj/Interventions       [facility] Name 11/01/23 1257          Sensory Assessment (Somatosensory)    Sensory Assessment (Somatosensory) sensation intact  -AC       Row Name 11/01/23 1257          Vision Assessment/Intervention    Visual Impairment/Limitations WFL  -[facility] Name 11/01/23 1257          Range of Motion Comprehensive    General Range of Motion bilateral upper extremity ROM WNL  -[facility] Name 11/01/23 1257          Strength Comprehensive (MMT)    General Manual Muscle Testing (MMT) Assessment no strength deficits identified  -[facility] Name 11/01/23 1257          Balance    Balance Assessment  standing dynamic balance  -AC     Dynamic Standing Balance contact guard;minimal assist  -     Position/Device Used, Standing Balance supported;walker, front-wheeled  -AC     Balance Interventions standing;sit to stand;supported;dynamic;minimal challenge;occupation based/functional task  -               User Key  (r) = Recorded By, (t) = Taken By, (c) = Cosigned By      Initials Name Provider Type    Lisette Paniagua OT Occupational Therapist                   Goals/Plan    No documentation.                  Clinical Impression       Row Name 11/01/23 1258          Pain Assessment    Pretreatment Pain Rating 1/10  -AC     Posttreatment Pain Rating 1/10  -AC     Pain Location - Side/Orientation Right  -AC     Pain Location lower  -AC     Pain Location - hip  -AC       Row Name 11/01/23 1258          Plan of Care Review    Plan of Care Reviewed With patient  -     Progress improving  -     Outcome Evaluation patient performed lower body bathing/dressing with less assist as well as transfers with less assist during this treatment session. patient to continue with therapy services in order to maximize independence with adls.  -AC               User Key  (r) = Recorded By, (t) = Taken By, (c) = Cosigned By      Initials Name Provider Type    Lisette Paniagua OT Occupational Therapist                   Outcome Measures       Row Name 11/01/23 1300          How much help from another is currently needed...    Putting on and taking off regular lower body clothing? 3  -AC     Bathing (including washing, rinsing, and drying) 3  -AC     Toileting (which includes using toilet bed pan or urinal) 3  -AC     Putting on and taking off regular upper body clothing 4  -AC     Taking care of personal grooming (such as brushing teeth) 4  -AC     Eating meals 4  -AC     AM-PAC 6 Clicks Score (OT) 21  -AC       Row Name 11/01/23 0701          How much help from another person do you currently need...    Turning from your back to  your side while in flat bed without using bedrails? 4  -MH     Moving from lying on back to sitting on the side of a flat bed without bedrails? 4  -MH     Moving to and from a bed to a chair (including a wheelchair)? 3  -MH     Standing up from a chair using your arms (e.g., wheelchair, bedside chair)? 3  -MH     Climbing 3-5 steps with a railing? 2  -MH     To walk in hospital room? 3  -MH     AM-PAC 6 Clicks Score (PT) 19  -MH     Highest level of mobility 6 --> Walked 10 steps or more  -MH       Row Name 11/01/23 1300          Functional Assessment    Outcome Measure Options AM-PAC 6 Clicks Daily Activity (OT);Optimal Instrument  -AC       Row Name 11/01/23 1300          Optimal Instrument    Optimal Instrument Optimal - 3  -AC     Bending/Stooping 2  -AC     Standing 2  -AC     Reaching 1  -AC     From the list, choose the 3 activities you would most like to be able to do without any difficulty Reaching  -AC     Total Score Optimal - 3 5  -AC               User Key  (r) = Recorded By, (t) = Taken By, (c) = Cosigned By      Initials Name Provider Type    Kim Perry, RN Registered Nurse    Lisette Paniagua OT Occupational Therapist                    Occupational Therapy Education       Title: PT OT SLP Therapies (Done)       Topic: Occupational Therapy (Done)       Point: ADL training (Done)       Description:   Instruct learner(s) on proper safety adaptation and remediation techniques during self care or transfers.   Instruct in proper use of assistive devices.                  Learning Progress Summary             Patient Acceptance, E, VU by  at 11/1/2023 1301                         Point: Home exercise program (Done)       Description:   Instruct learner(s) on appropriate technique for monitoring, assisting and/or progressing therapeutic exercises/activities.                  Learning Progress Summary             Patient Acceptance, E, VU by  at 11/1/2023 1301                         Point:  Precautions (Done)       Description:   Instruct learner(s) on prescribed precautions during self-care and functional transfers.                  Learning Progress Summary             Patient Acceptance, E, VU by  at 11/1/2023 1301                         Point: Body mechanics (Done)       Description:   Instruct learner(s) on proper positioning and spine alignment during self-care, functional mobility activities and/or exercises.                  Learning Progress Summary             Patient Acceptance, E, VU by  at 11/1/2023 1301                                         User Key       Initials Effective Dates Name Provider Type Discipline     06/16/21 -  Lisette Evans OT Occupational Therapist OT                  OT Recommendation and Plan     Plan of Care Review  Plan of Care Reviewed With: patient  Progress: improving  Outcome Evaluation: patient performed lower body bathing/dressing with less assist as well as transfers with less assist during this treatment session. patient to continue with therapy services in order to maximize independence with adls.     Time Calculation:         Time Calculation- OT       Row Name 11/01/23 1302             Time Calculation- OT    OT Received On 11/01/23  -AC         Timed Charges    08016 - OT Therapeutic Activity Minutes 13  -AC      11825 - OT Self Care/Mgmt Minutes 25  -AC         Total Minutes    Timed Charges Total Minutes 38  -AC       Total Minutes 38  -AC                User Key  (r) = Recorded By, (t) = Taken By, (c) = Cosigned By      Initials Name Provider Type     Lisette Evans OT Occupational Therapist                  Therapy Charges for Today       Code Description Service Date Service Provider Modifiers Qty    96746920630 HC OT THERAPEUTIC ACT EA 15 MIN 11/1/2023 Lisette Evans OT GO 1    08180804738 HC OT SELF CARE/MGMT/TRAIN EA 15 MIN 11/1/2023 Lisette Evans OT GO 2                 Lisette Evans OT  11/1/2023

## 2023-11-01 NOTE — PLAN OF CARE
Problem: Adult Inpatient Plan of Care  Goal: Plan of Care Review  Outcome: Ongoing, Progressing  Goal: Patient-Specific Goal (Individualized)  Outcome: Ongoing, Progressing  Goal: Absence of Hospital-Acquired Illness or Injury  Outcome: Ongoing, Progressing  Intervention: Identify and Manage Fall Risk  Recent Flowsheet Documentation  Taken 11/1/2023 1400 by Kim Perez RN  Safety Promotion/Fall Prevention: safety round/check completed  Taken 11/1/2023 1200 by Kim Perez RN  Safety Promotion/Fall Prevention: safety round/check completed  Taken 11/1/2023 1000 by Kim Perez RN  Safety Promotion/Fall Prevention: safety round/check completed  Taken 11/1/2023 0800 by Kim Perez RN  Safety Promotion/Fall Prevention: safety round/check completed  Taken 11/1/2023 0701 by Kim Perez RN  Safety Promotion/Fall Prevention: safety round/check completed  Intervention: Prevent Skin Injury  Recent Flowsheet Documentation  Taken 11/1/2023 0701 by Kim Perez RN  Body Position: position changed independently  Skin Protection:   adhesive use limited   tubing/devices free from skin contact  Intervention: Prevent and Manage VTE (Venous Thromboembolism) Risk  Recent Flowsheet Documentation  Taken 11/1/2023 0701 by Kim Perez RN  Activity Management: ambulated in room  VTE Prevention/Management:   bilateral   compression stockings on  Range of Motion: ROM (range of motion) performed  Intervention: Prevent Infection  Recent Flowsheet Documentation  Taken 11/1/2023 0701 by Kim Perez RN  Infection Prevention:   environmental surveillance performed   hand hygiene promoted   rest/sleep promoted   single patient room provided   visitors restricted/screened  Goal: Optimal Comfort and Wellbeing  Outcome: Ongoing, Progressing  Intervention: Monitor Pain and Promote Comfort  Recent Flowsheet Documentation  Taken 11/1/2023 0841 by Kim Perez RN  Pain Management Interventions:   care clustered    cold applied   see MAR   quiet environment facilitated  Taken 11/1/2023 0701 by Kim Perez RN  Pain Management Interventions:   see MAR   quiet environment facilitated   care clustered   cold applied  Intervention: Provide Person-Centered Care  Recent Flowsheet Documentation  Taken 11/1/2023 0701 by Kim Perez RN  Trust Relationship/Rapport:   care explained   emotional support provided   questions answered   thoughts/feelings acknowledged  Goal: Readiness for Transition of Care  Outcome: Ongoing, Progressing     Problem: Skin Injury Risk Increased  Goal: Skin Health and Integrity  Outcome: Ongoing, Progressing  Intervention: Optimize Skin Protection  Recent Flowsheet Documentation  Taken 11/1/2023 0701 by Kim Perez RN  Pressure Reduction Techniques:   frequent weight shift encouraged   heels elevated off bed  Head of Bed (HOB) Positioning: HOB elevated  Pressure Reduction Devices:   pressure-redistributing mattress utilized   positioning supports utilized   heel offloading device utilized  Skin Protection:   adhesive use limited   tubing/devices free from skin contact     Problem: Pain Acute  Goal: Acceptable Pain Control and Functional Ability  Outcome: Ongoing, Progressing  Intervention: Prevent or Manage Pain  Recent Flowsheet Documentation  Taken 11/1/2023 1400 by Kim Perez RN  Medication Review/Management: medications reviewed  Taken 11/1/2023 1200 by Kim Perez RN  Medication Review/Management: medications reviewed  Taken 11/1/2023 1000 by Kim Perez RN  Medication Review/Management: medications reviewed  Taken 11/1/2023 0800 by Kim Perez RN  Medication Review/Management: medications reviewed  Taken 11/1/2023 0701 by Kim Perez RN  Sensory Stimulation Regulation: care clustered  Sleep/Rest Enhancement: awakenings minimized  Medication Review/Management: medications reviewed  Intervention: Develop Pain Management Plan  Recent Flowsheet Documentation  Taken  11/1/2023 0841 by Kim Perez RN  Pain Management Interventions:   care clustered   cold applied   see MAR   quiet environment facilitated  Taken 11/1/2023 0701 by Kim Perez RN  Pain Management Interventions:   see MAR   quiet environment facilitated   care clustered   cold applied  Intervention: Optimize Psychosocial Wellbeing  Recent Flowsheet Documentation  Taken 11/1/2023 0701 by Kim Perez RN  Supportive Measures: active listening utilized  Diversional Activities:   television   smartphone     Problem: Fall Injury Risk  Goal: Absence of Fall and Fall-Related Injury  Outcome: Ongoing, Progressing  Intervention: Identify and Manage Contributors  Recent Flowsheet Documentation  Taken 11/1/2023 1400 by Kim Perez RN  Medication Review/Management: medications reviewed  Taken 11/1/2023 1200 by Kim Perez RN  Medication Review/Management: medications reviewed  Taken 11/1/2023 1000 by Kim Perez RN  Medication Review/Management: medications reviewed  Taken 11/1/2023 0800 by Kim Perez RN  Medication Review/Management: medications reviewed  Taken 11/1/2023 0701 by Kim Perez RN  Medication Review/Management: medications reviewed  Self-Care Promotion: independence encouraged  Intervention: Promote Injury-Free Environment  Recent Flowsheet Documentation  Taken 11/1/2023 1400 by Kim Perez RN  Safety Promotion/Fall Prevention: safety round/check completed  Taken 11/1/2023 1200 by Kim Perez RN  Safety Promotion/Fall Prevention: safety round/check completed  Taken 11/1/2023 1000 by Kim Perez RN  Safety Promotion/Fall Prevention: safety round/check completed  Taken 11/1/2023 0800 by Kim Perez RN  Safety Promotion/Fall Prevention: safety round/check completed  Taken 11/1/2023 0701 by Kim Perez RN  Safety Promotion/Fall Prevention: safety round/check completed     Problem: Bleeding (Orthopaedic Fracture)  Goal: Absence of Bleeding  Outcome:  Ongoing, Progressing     Problem: Embolism (Orthopaedic Fracture)  Goal: Absence of Embolism Signs and Symptoms  Outcome: Ongoing, Progressing  Intervention: Prevent or Manage Embolism Risk  Recent Flowsheet Documentation  Taken 11/1/2023 0701 by Kim Perez RN  VTE Prevention/Management:   bilateral   compression stockings on     Problem: Fracture Stabilization and Management (Orthopaedic Fracture)  Goal: Fracture Stability  Outcome: Ongoing, Progressing     Problem: Functional Ability Impaired (Orthopaedic Fracture)  Goal: Optimal Functional Ability  Outcome: Ongoing, Progressing  Intervention: Optimize Functional Ability  Recent Flowsheet Documentation  Taken 11/1/2023 0701 by Kim Perez RN  Activity Management: ambulated in room  Activity Assistance Provided: assistance, 1 person  Positioning/Transfer Devices:   in use   pillows  Self-Care Promotion: independence encouraged  Range of Motion: ROM (range of motion) performed     Problem: Infection (Orthopaedic Fracture)  Goal: Absence of Infection Signs and Symptoms  Outcome: Ongoing, Progressing  Intervention: Prevent or Manage Infection  Recent Flowsheet Documentation  Taken 11/1/2023 0701 by Kim Perez RN  Infection Prevention:   environmental surveillance performed   hand hygiene promoted   rest/sleep promoted   single patient room provided   visitors restricted/screened     Problem: Neurovascular Compromise (Orthopaedic Fracture)  Goal: Effective Tissue Perfusion  Outcome: Ongoing, Progressing  Intervention: Prevent or Manage Neurovascular Compromise  Recent Flowsheet Documentation  Taken 11/1/2023 0701 by Kim Perez RN  Compartment Syndrome Management: active flexion/extension encouraged  Compartment Syndrome Surveillance: no pain with passive muscle stretch     Problem: Pain (Orthopaedic Fracture)  Goal: Acceptable Pain Control  Outcome: Ongoing, Progressing  Intervention: Manage Acute Orthopaedic-Related Pain  Recent Flowsheet  Documentation  Taken 11/1/2023 0841 by Kim Perez RN  Pain Management Interventions:   care clustered   cold applied   see MAR   quiet environment facilitated  Taken 11/1/2023 0701 by Kim Perez RN  Pain Management Interventions:   see MAR   quiet environment facilitated   care clustered   cold applied  Diversional Activities:   television   smartphone     Problem: Respiratory Compromise (Orthopaedic Fracture)  Goal: Effective Oxygenation and Ventilation  Outcome: Ongoing, Progressing  Intervention: Promote Airway Secretion Clearance  Recent Flowsheet Documentation  Taken 11/1/2023 0701 by Kim Perez RN  Cough And Deep Breathing: done independently per patient   Goal Outcome Evaluation:   Pt has had no new changes throughout shift and continues to remain stable. Pt is POD2 of [facility] hip ORIF performed by Dr. Wolfe. Pt has had complaints of pain that has been controlled with prn medication. Pt has been medicated x1 during shift. Pt has voided without difficulty. Pt is x1 assist with walker and gait belt. Pt was denied [facility] for rehab. Pt and family are deciding DC planning, sub acute facility referrals were sent out via .

## 2023-11-01 NOTE — PROGRESS NOTES
Orthopedic hip nailing progress Note    Assessment/Plan awaiting rehab placement, DVT prophylaxis, follow-up 2 weeks, weight-bear as tolerated , PT/OT    Status post-right hip nailing: Doing well postoperatively.    Pain Relief: some relief    Continues current post-op course    Activity: up with assistance    Weight Bearing: WBAT     LOS: 0 days     Subjective     Post-Operative Day: 2 post-op hip nailing   systemic or Specific Complaints: No Complaints    Objective     Vital signs in last 24 hours:  Vitals:    10/31/23 1900 10/31/23 2025 10/31/23 2300 11/01/23 0300   BP: 125/66  122/67 119/65   BP Location: Left arm  Left arm Left arm   Patient Position: Sitting  Sitting Lying   Pulse: 68 70 66 65   Resp: 16 16 16 16   Temp: 97.8 °F (36.6 °C)  98.4 °F (36.9 °C) 98 °F (36.7 °C)   TempSrc: Oral  Oral Oral   SpO2: 90% 95% 93% 95%   Weight:       Height:            General: alert, appears stated age, and cooperative   Neurovascular: Tibial nerve: Intact, Superficial peroneal nerve: Intact, and Deep peroneal nerve: Intact  Capillary refill: Normal   Wound: Dressing remains clean and dry no evidence of infection.   Range of Motion: Limited flexsion and Limited extension   DVT Exam: Calf soft      WBC   Date Value Ref Range Status   11/01/2023 8.58 3.40 - 10.80 10*3/mm3 Final     RBC   Date Value Ref Range Status   11/01/2023 4.20 4.14 - 5.80 10*6/mm3 Final     Hemoglobin   Date Value Ref Range Status   11/01/2023 11.5 (L) 13.0 - 17.7 g/dL Final     Hematocrit   Date Value Ref Range Status   11/01/2023 36.2 (L) 37.5 - 51.0 % Final     MCV   Date Value Ref Range Status   11/01/2023 86.2 79.0 - 97.0 fL Final     MCH   Date Value Ref Range Status   11/01/2023 27.4 26.6 - 33.0 pg Final     MCHC   Date Value Ref Range Status   11/01/2023 31.8 31.5 - 35.7 g/dL Final     RDW   Date Value Ref Range Status   11/01/2023 13.5 12.3 - 15.4 % Final     RDW-SD   Date Value Ref Range Status   11/01/2023 42.5 37.0 - 54.0 fl Final      MPV   Date Value Ref Range Status   11/01/2023 9.5 6.0 - 12.0 fL Final     Platelets   Date Value Ref Range Status   11/01/2023 220 140 - 450 10*3/mm3 Final     Neutrophil %   Date Value Ref Range Status   10/29/2023 82.9 (H) 42.7 - 76.0 % Final     Lymphocyte %   Date Value Ref Range Status   10/29/2023 7.8 (L) 19.6 - 45.3 % Final     Monocyte %   Date Value Ref Range Status   10/29/2023 7.1 5.0 - 12.0 % Final     Eosinophil %   Date Value Ref Range Status   10/29/2023 1.5 0.3 - 6.2 % Final     Basophil %   Date Value Ref Range Status   10/29/2023 0.3 0.0 - 1.5 % Final     Immature Grans %   Date Value Ref Range Status   10/29/2023 0.4 0.0 - 0.5 % Final     Neutrophils, Absolute   Date Value Ref Range Status   10/29/2023 10.04 (H) 1.70 - 7.00 10*3/mm3 Final     Lymphocytes, Absolute   Date Value Ref Range Status   10/29/2023 0.95 0.70 - 3.10 10*3/mm3 Final     Monocytes, Absolute   Date Value Ref Range Status   10/29/2023 0.86 0.10 - 0.90 10*3/mm3 Final     Eosinophils, Absolute   Date Value Ref Range Status   10/29/2023 0.18 0.00 - 0.40 10*3/mm3 Final     Basophils, Absolute   Date Value Ref Range Status   10/29/2023 0.04 0.00 - 0.20 10*3/mm3 Final     Immature Grans, Absolute   Date Value Ref Range Status   10/29/2023 0.05 0.00 - 0.05 10*3/mm3 Final     nRBC   Date Value Ref Range Status   10/29/2023 0.0 0.0 - 0.2 /100 WBC Final        Basic Metabolic Panel    Sodium Sodium   Date Value Ref Range Status   11/01/2023 136 136 - 145 mmol/L Final   10/31/2023 133 (L) 136 - 145 mmol/L Final   10/30/2023 134 (L) 136 - 145 mmol/L Final   10/29/2023 132 (L) 136 - 145 mmol/L Final      Potassium Potassium   Date Value Ref Range Status   11/01/2023 4.2 3.5 - 5.2 mmol/L Final   10/31/2023 4.5 3.5 - 5.2 mmol/L Final   10/30/2023 4.1 3.5 - 5.2 mmol/L Final   10/29/2023 4.3 3.5 - 5.2 mmol/L Final      Chloride Chloride   Date Value Ref Range Status   11/01/2023 98 98 - 107 mmol/L Final   10/31/2023 98 98 - 107 mmol/L Final  "  10/30/2023 97 (L) 98 - 107 mmol/L Final   10/29/2023 95 (L) 98 - 107 mmol/L Final      Bicarbonate No results found for: \"PLASMABICARB\"   BUN BUN   Date Value Ref Range Status   11/01/2023 11 8 - 23 mg/dL Final   10/31/2023 9 8 - 23 mg/dL Final   10/30/2023 15 8 - 23 mg/dL Final   10/29/2023 15 8 - 23 mg/dL Final      Creatinine Creatinine   Date Value Ref Range Status   11/01/2023 0.75 (L) 0.76 - 1.27 mg/dL Final   10/31/2023 0.72 (L) 0.76 - 1.27 mg/dL Final   10/30/2023 0.57 (L) 0.76 - 1.27 mg/dL Final   10/29/2023 0.73 (L) 0.76 - 1.27 mg/dL Final      Calcium Calcium   Date Value Ref Range Status   11/01/2023 8.5 (L) 8.6 - 10.5 mg/dL Final   10/31/2023 8.7 8.6 - 10.5 mg/dL Final   10/30/2023 8.5 (L) 8.6 - 10.5 mg/dL Final   10/29/2023 8.9 8.6 - 10.5 mg/dL Final      Glucose      No components found for: \"GLUCOSE.*\"      FL Surgery Fluoro   Final Result      XR Hip With or Without Pelvis 2 - 3 View Right   Final Result       1. Acute intertrochanteric fracture of the right proximal femur.                    VIV RODRIGUEZ MD          Electronically Signed and Approved By: VIV RODRIGUEZ MD on 10/29/2023 at 21:33                                "

## 2023-11-01 NOTE — PROGRESS NOTES
[facility]   Hospitalist Progress Note  Date: 2023  Patient Name: Colton Chiang  : 1960  MRN: [id number]  Date of admission: 10/29/2023  Room/Bed: [facility]/      Subjective   Subjective     Chief Complaint: Fall    Summary:Colton Chiang is a 63 y.o. male with past medical history significant for HTN, hypothyroidism, HLD, prostate cancer s/p radiation, and tobacco abuse who presented after a fall. Patient was at [facility] 10/29/2023 when the fall happened.  He slipped over something and fell on his right side.  He denies hitting his head.  He is not on anticoag.  He has pain on his right hip following the fall.  He endorses some redness on his leg.  He denies fever, no chills, no chest pain, no dysuria.    Interval Followup: No acute interval events.  No acute distress.  Patient is resting comfortably.  Reports his pain is mostly controlled.    He denies any chest pain, shortness of breath, or abdominal pain.  Reports that he had a PET scan ordered for Wednesday for possible prostate cancer recurrence from urology.  Patient has been up and walking with PT in his room     Review of Systems    All systems reviewed and negative except for what is outlined above.      Objective   Objective     Vitals:   Temp:  [97.7 °F (36.5 °C)-99 °F (37.2 °C)] 97.7 °F (36.5 °C)  Heart Rate:  [62-72] 62  Resp:  [16] 16  BP: (117-131)/(65-67) 131/67    Physical Exam   Gen: NAD, Alert and Oriented. Resting in bedside chair. Wife at the bedside   Cards: RRR, no murmur   Pulm: CTA b/l, no wheezing  Abd: soft, nondistended  Extremities: no pitting edema  Skin: clean and dry     Result Review    Result Review:  I have personally reviewed these results:  [x]  Laboratory      Lab 11/01/23  0405 10/31/23  0401 10/30/23  0402 10/29/23  2139   WBC 8.58  --  9.32 12.12*   HEMOGLOBIN 11.5* 11.0* 11.3* 12.8*   HEMATOCRIT 36.2* 35.5* 36.3* 39.6   PLATELETS 220  --  240 255   NEUTROS ABS  --   --   --  10.04*   IMMATURE GRANS (ABS)  --   --    --  0.05   LYMPHS ABS  --   --   --  0.95   MONOS ABS  --   --   --  0.86   EOS ABS  --   --   --  0.18   MCV 86.2  --  87.1 85.3   APTT  --   --   --  34.6*         Lab 11/01/23  0405 10/31/23  0401 10/30/23  0402 10/29/23  2139   SODIUM 136 133* 134* 132*   POTASSIUM 4.2 4.5 4.1 4.3   CHLORIDE 98 98 97* 95*   CO2 30.8* 28.7 28.5 27.8   ANION GAP 7.2 6.3 8.5 9.2   BUN 11 9 15 15   CREATININE 0.75* 0.72* 0.57* 0.73*   EGFR 100.1 101.4 108.8 101.0   GLUCOSE 99 165* 131* 103*   CALCIUM 8.5* 8.7 8.5* 8.9   MAGNESIUM 1.8  --  1.9  --    PHOSPHORUS  --   --  3.8  --    TSH  --   --   --  2.530         Lab 10/30/23  0402 10/29/23  2139   TOTAL PROTEIN 6.4 7.3   ALBUMIN 3.2* 3.8   GLOBULIN 3.2 3.5   ALT (SGPT) 25 29   AST (SGOT) 22 26   BILIRUBIN 0.2 0.3   ALK PHOS 84 98   LIPASE  --  14                     Brief Urine Lab Results  (Last result in the past 365 days)        Color   Clarity   Blood   Leuk Est   Nitrite   Protein   CREAT   Urine HCG        08/14/23 0825 Yellow   Clear   Negative   Negative   Negative   Negative                 [x]  Microbiology   Microbiology Results (last 10 days)       ** No results found for the last 240 hours. **          [x]  Radiology  XR Hip With or Without Pelvis 2 - 3 View Right    Result Date: 10/29/2023    1. Acute intertrochanteric fracture of the right proximal femur.      VIV RODRIGUEZ MD       Electronically Signed and Approved By: VIV RODRIGUEZ MD on 10/29/2023 at 21:33            []  EKG/Telemetry   []  Cardiology/Vascular   []  Pathology  []  Old records  []  Other:    Assessment & Plan   Assessment / Plan     Assessment:  Right proximal femur fracture  Mechanical fall  History prostate cancer, s/p radiation  Hypertension   Hyperlipidemia  Hypothyroidism  Tobacco abuse  Right lower leg cellulitis    Plan:  Orthopedics consulted and s/p HIP TROCHANTERIC NAILING WITH INTRAMEDULLARY HIP SCREW   Patient wants rehab placement. Precert has been started   PT/OT  Continue home  medications   Nicotine patch  Patient reports that he is being evaluated for possible recurrence of his prostate cancer.  Originally diagnosed in 2008 and is s/p radiation.  Reports that his PSA has recently doubled and he had a PET scan scheduled for Wednesday but will need to reschedule this      Discussed with RN.    DVT prophylaxis:  Medical and mechanical DVT prophylaxis orders are present.    CODE STATUS:   Level Of Support Discussed With: Patient  Code Status (Patient has no pulse and is not breathing): CPR (Attempt to Resuscitate)  Medical Interventions (Patient has pulse or is breathing): Full Support

## 2023-11-01 NOTE — THERAPY TREATMENT NOTE
Acute Care - Physical Therapy Treatment Note  BOOGIE Yoon     Patient Name: Colton Chiang  : 1960  MRN: [id number]  Today's Date: 2023      Visit Dx:     ICD-10-CM ICD-9-CM   1. Closed intertrochanteric fracture of hip, right, initial encounter  S72.141A 820.21   2. Fall on same level from slipping, tripping or stumbling, initial encounter  W01.0XXA E885.9   3. Difficulty walking  R26.2 719.7   4. Decreased activities of daily living (ADL)  Z78.9 V49.89   5. Chronic obstructive pulmonary disease, unspecified COPD type  J44.9 496     Patient Active Problem List   Diagnosis    HTN (hypertension)    ED (erectile dysfunction)    History of CVA (cerebrovascular accident)    Class 1 obesity due to excess calories with serious comorbidity and body mass index (BMI) of 32.0 to 32.9 in adult    Primary osteoarthritis involving multiple joints    Hyperlipidemia LDL goal <100    Seasonal allergies    Acquired hypothyroidism    History of prostate cancer    Cigarette nicotine dependence with nicotine-induced disorder    Chronic diastolic (congestive) heart failure    Iron deficiency anemia    RLS (restless legs syndrome)    Erysipelas    Prostate cancer    Right leg weakness    Fall    Closed intertrochanteric fracture of hip, right, initial encounter     Past Medical History:   Diagnosis Date    Allergies     Arthritis     Broken bones     Cracked    CHF (congestive heart failure)     Chronic back pain     Deafness, bilateral     Depression     HBP (high blood pressure)     Heart disease     High cholesterol     History of radiation therapy     Hypothyroid 02/10/2017    Prostate cancer 2016    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, bilateral     Stroke     Thyroid disorder      Past Surgical History:   Procedure Laterality Date    COLONOSCOPY      HIP TROCHANTERIC NAILING WITH INTRAMEDULLARY HIP SCREW Right 10/30/2023    Procedure: HIP TROCHANTERIC NAILING WITH INTRAMEDULLARY HIP SCREW;  Surgeon: Sandra Wolfe MD;  Location:  [facility] MAIN [facility];  Service: Orthopedics;  Laterality: Right;    PROSTATE BIOPSY       PT Assessment (last 12 hours)       PT Evaluation and Treatment       [facility] Name 11/01/23 1507          Physical Therapy Time and Intention    Subjective Information complains of;weakness;pain  -     Document Type therapy note (daily note)  -     Mode of Treatment physical therapy;individual therapy  -     Patient Effort good  -[facility] Name 11/01/23 1507          Pain    Additional Documentation Pain Scale: FACES Pre/Post-Treatment (Group)  -[facility] Name 11/01/23 1507          Pain Scale: FACES Pre/Post-Treatment    Pain: FACES Scale, Pretreatment 2-->hurts little bit  -RH     Posttreatment Pain Rating 2-->hurts little bit  -RH     Pain Location - Side/Orientation Right  -     Pain Location - hip  -[facility] Name 11/01/23 1507          Range of Motion (ROM)    Range of Motion --  Pt R hip AAROM at 90 degrees flex and 20 degrees abd.  -[facility] Name 11/01/23 1507          Strength (Manual Muscle Testing)    Strength (Manual Muscle Testing) --  Pt R hip flex strength at 3-/5.  -[facility] Name 11/01/23 1507          Mobility    Extremity Weight-bearing Status right lower extremity  -     Right Lower Extremity (Weight-bearing Status) weight-bearing as tolerated (WBAT)  -[facility] Name 11/01/23 1507          Transfers    Transfers sit-stand transfer;stand-sit transfer  -     Maintains Weight-bearing Status (Transfers) able to maintain  -RH       Row Name 11/01/23 1507          Sit-Stand Transfer    Sit-Stand [place] (Transfers) contact [facility]  -     Assistive Device (Sit-Stand Transfers) walker, front-wheeled  -[facility] Name 11/01/23 1507          Stand-Sit Transfer    Stand-Sit [place] (Transfers) contact [facility]  -     Assistive Device (Stand-Sit Transfers) walker, front-wheeled  -[facility] Name 11/01/23 1507          Gait/Stairs (Locomotion)    Gait/Stairs Locomotion gait/ambulation  independence;gait/ambulation assistive device;distance ambulated;gait pattern;gait deviations  -RH     [place] Level (Gait) contact guard  -RH     Assistive Device (Gait) walker, front-wheeled  -RH     Distance in Feet (Gait) 14  -RH     Pattern (Gait) 3-point;step-to  -RH     Deviations/Abnormal Patterns (Gait) base of support, narrow;antalgic;gait speed decreased;stride length decreased  -RH     Bilateral Gait Deviations heel strike decreased  -RH     Gait Assessment/Intervention Pt amb with RW and CGA with 3 point step-to gait pattern with narrow base of support with pt advancement of the LLE to limit RLE weight-bearing.  -RH       Row Name 11/01/23 1507          Balance    Dynamic Standing Balance contact guard  -RH     Position/Device Used, Standing Balance walker, front-wheeled  -RH       Row Name             Wound 10/30/23 0047 Right distal leg Other (comment)    Wound - Properties Group Placement Date: 10/30/23  -RK Placement Time: 0047  -RK Present on Original Admission: Y  -RK Side: Right  -RK Orientation: distal  -RK Location: leg  -RK Primary Wound Type: Other  -RK    Retired Wound - Properties Group Placement Date: 10/30/23  -RK Placement Time: 0047  -RK Present on Original Admission: Y  -RK Side: Right  -RK Orientation: distal  -RK Location: leg  -RK Primary Wound Type: Other  -RK    Retired Wound - Properties Group Date first assessed: 10/30/23  -RK Time first assessed: 0047  -RK Present on Original Admission: Y  -RK Side: Right  -RK Location: leg  -RK Primary Wound Type: Other  -RK      Row Name             Wound 10/30/23 1404 Right anterior greater trochanter Incision    Wound - Properties Group Placement Date: 10/30/23  -MH Placement Time: 1404 -MH Side: Right  -MH Orientation: anterior  -MH Location: greater trochanter  -MH Primary Wound Type: Incision  -MH    Retired Wound - Properties Group Placement Date: 10/30/23  -MH Placement Time: 1404 -MH Side: Right  -MH Orientation: anterior  -MH  Location: greater trochanter  -MH Primary Wound Type: Incision  -MH    Retired Wound - Properties Group Date first assessed: 10/30/23  -MH Time first assessed: 1404  -MH Side: Right  -MH Location: greater trochanter  -MH Primary Wound Type: Incision  -MH      Row Name 11/01/23 1507          Progress Summary (PT)    Progress Toward Functional Goals (PT) progress toward functional goals is good  -RH               User Key  (r) = Recorded By, (t) = Taken By, (c) = Cosigned By      Initials Name Provider Type    Malia Goins, RN Registered Nurse    Jalil Branch PTA Physical Therapist Assistant    Julian Pascual RN Registered Nurse                Right Lower Extremity   Exercise  Reps  Sets    Short arc quads   10 2   Heel slides  10 2   Ankle pumps  10 2   Quad sets  10 2   Gluteal sets 10 2   Hip ab/adduction 10 2            PT Recommendation and Plan     Progress Summary (PT)  Progress Toward Functional Goals (PT): progress toward functional goals is good   Outcome Measures       Row Name 11/01/23 1500 10/31/23 1100          How much help from another person do you currently need...    Turning from your back to your side while in flat bed without using bedrails? 4  -RH 4  -DP     Moving from lying on back to sitting on the side of a flat bed without bedrails? 4  -RH 4  -DP     Moving to and from a bed to a chair (including a wheelchair)? 3  -RH 3  -DP     Standing up from a chair using your arms (e.g., wheelchair, bedside chair)? 3  -RH 2  -DP     Climbing 3-5 steps with a railing? 2  -RH 2  -DP     To walk in hospital room? 3  -RH 3  -DP     AM-PAC 6 Clicks Score (PT) 19  -RH 18  -DP     Highest level of mobility 6 --> Walked 10 steps or more  -RH 6 --> Walked 10 steps or more  -DP               User Key  (r) = Recorded By, (t) = Taken By, (c) = Cosigned By      Initials Name Provider Type    Jalil Branch PTA Physical Therapist Assistant    Aviva Cornejo, PT Physical Therapist                      Time Calculation:    PT Charges       Row Name 11/01/23 1506             Time Calculation    PT Received On 11/01/23  -RH         Timed Charges    21124 - PT Therapeutic Exercise Minutes 16  -RH      02569 - Gait Training Minutes  4  -RH      70511 - PT Therapeutic Activity Minutes 4  -RH         Total Minutes    Timed Charges Total Minutes 24  -RH       Total Minutes 24  -RH                User Key  (r) = Recorded By, (t) = Taken By, (c) = Cosigned By      Initials Name Provider Type     Jalil Salazar PTA Physical Therapist Assistant                  Therapy Charges for Today       Code Description Service Date Service Provider Modifiers Qty    46250077778 HC PT THER PROC EA 15 MIN 11/1/2023 Jalil Salazar PTA GP 1    22292056424 HC GAIT TRAINING EA 15 MIN 11/1/2023 Jalil Salazar PTA GP 1            PT G-Codes  Outcome Measure Options: AM-PAC 6 Clicks Daily Activity (OT), Optimal Instrument  AM-PAC 6 Clicks Score (PT): 19  AM-PAC 6 Clicks Score (OT): 21    Jalil Salazar PTA  11/1/2023

## 2023-11-01 NOTE — PLAN OF CARE
Goal Outcome Evaluation:  Plan of Care Reviewed With: patient        Progress: improving  Outcome Evaluation: Pt alert and able to make needs known. Pain controlled with PRN Norco. Pt urinating without difficulty. Pt one person assist with gait belt and rolling walker for transfers, ambulated 6 feet-tolerated well. Pt awaiting rehab placement.          Pulmonology

## 2023-11-02 ENCOUNTER — READMISSION MANAGEMENT (OUTPATIENT)
Dept: CALL CENTER | Facility: HOSPITAL | Age: 63
End: 2023-11-02
Payer: MEDICARE

## 2023-11-02 VITALS
TEMPERATURE: 97.8 F | HEIGHT: 71 IN | BODY MASS INDEX: 32.13 KG/M2 | HEART RATE: 69 BPM | SYSTOLIC BLOOD PRESSURE: 116 MMHG | OXYGEN SATURATION: 99 % | DIASTOLIC BLOOD PRESSURE: 62 MMHG | RESPIRATION RATE: 18 BRPM | WEIGHT: 229.5 LBS

## 2023-11-02 PROCEDURE — 94799 UNLISTED PULMONARY SVC/PX: CPT

## 2023-11-02 PROCEDURE — 97530 THERAPEUTIC ACTIVITIES: CPT

## 2023-11-02 PROCEDURE — 97116 GAIT TRAINING THERAPY: CPT

## 2023-11-02 PROCEDURE — 97535 SELF CARE MNGMENT TRAINING: CPT

## 2023-11-02 RX ORDER — HYDROCODONE BITARTRATE AND ACETAMINOPHEN 7.5; 325 MG/1; MG/1
1-2 TABLET ORAL EVERY 4 HOURS PRN
Qty: 40 TABLET | Refills: 0 | OUTPATIENT
Start: 2023-11-02

## 2023-11-02 RX ORDER — HYDROCODONE BITARTRATE AND ACETAMINOPHEN 7.5; 325 MG/1; MG/1
1-2 TABLET ORAL EVERY 4 HOURS PRN
Qty: 40 TABLET | Refills: 0 | Status: SHIPPED | OUTPATIENT
Start: 2023-11-02

## 2023-11-02 RX ADMIN — Medication 10 ML: at 08:13

## 2023-11-02 RX ADMIN — HYDROCODONE BITARTRATE AND ACETAMINOPHEN 2 TABLET: 7.5; 325 TABLET ORAL at 08:11

## 2023-11-02 RX ADMIN — LEVOTHYROXINE SODIUM 274 MCG: 137 TABLET ORAL at 06:15

## 2023-11-02 RX ADMIN — LIOTHYRONINE SODIUM 10 MCG: 5 TABLET ORAL at 06:15

## 2023-11-02 RX ADMIN — FERROUS SULFATE TAB 325 MG (65 MG ELEMENTAL FE) 325 MG: 325 (65 FE) TAB at 08:11

## 2023-11-02 RX ADMIN — FAMOTIDINE 40 MG: 20 TABLET, FILM COATED ORAL at 08:11

## 2023-11-02 RX ADMIN — METOPROLOL SUCCINATE 25 MG: 25 TABLET, EXTENDED RELEASE ORAL at 08:11

## 2023-11-02 NOTE — PROGRESS NOTES
Orthopedic hip nailing progress Note    Assessment/Plan to rehab when available, DVT prophylaxis, follow-up 2 weeks, weight-bear as tolerated , PT/OT    Status post-right hip nailing: Doing well postoperatively.    Pain Relief: some relief    Continues current post-op course    Activity: up with assistance    Weight Bearing: WBAT     LOS: 0 days     Subjective     Post-Operative Day: 2 post-op hip nailing   systemic or Specific Complaints: No Complaints    Objective     Vital signs in last 24 hours:  Vitals:    11/01/23 1937 11/01/23 2325 11/02/23 0317 11/02/23 0656   BP: 134/67 123/65 123/65    BP Location: Left arm Left arm Left arm    Patient Position: Sitting Lying Lying    Pulse: 76 72 68 69   Resp: 16 16 16 18   Temp: 98.3 °F (36.8 °C) 98.3 °F (36.8 °C) 98.4 °F (36.9 °C)    TempSrc: Oral Oral Oral    SpO2: 94% (!) 89% 94% 91%   Weight:       Height:            General: alert, appears stated age, and cooperative   Neurovascular: Tibial nerve: Intact, Superficial peroneal nerve: Intact, and Deep peroneal nerve: Intact  Capillary refill: Normal   Wound: Dressing is clean and dry no evidence of infection.   Range of Motion: Limited flexsion and Limited extension   DVT Exam: Calf supple      WBC   Date Value Ref Range Status   11/01/2023 8.58 3.40 - 10.80 10*3/mm3 Final     RBC   Date Value Ref Range Status   11/01/2023 4.20 4.14 - 5.80 10*6/mm3 Final     Hemoglobin   Date Value Ref Range Status   11/01/2023 11.5 (L) 13.0 - 17.7 g/dL Final     Hematocrit   Date Value Ref Range Status   11/01/2023 36.2 (L) 37.5 - 51.0 % Final     MCV   Date Value Ref Range Status   11/01/2023 86.2 79.0 - 97.0 fL Final     MCH   Date Value Ref Range Status   11/01/2023 27.4 26.6 - 33.0 pg Final     MCHC   Date Value Ref Range Status   11/01/2023 31.8 31.5 - 35.7 g/dL Final     RDW   Date Value Ref Range Status   11/01/2023 13.5 12.3 - 15.4 % Final     RDW-SD   Date Value Ref Range Status   11/01/2023 42.5 37.0 - 54.0 fl Final  "    MPV   Date Value Ref Range Status   11/01/2023 9.5 6.0 - 12.0 fL Final     Platelets   Date Value Ref Range Status   11/01/2023 220 140 - 450 10*3/mm3 Final        Basic Metabolic Panel    Sodium Sodium   Date Value Ref Range Status   11/01/2023 136 136 - 145 mmol/L Final   10/31/2023 133 (L) 136 - 145 mmol/L Final      Potassium Potassium   Date Value Ref Range Status   11/01/2023 4.2 3.5 - 5.2 mmol/L Final   10/31/2023 4.5 3.5 - 5.2 mmol/L Final      Chloride Chloride   Date Value Ref Range Status   11/01/2023 98 98 - 107 mmol/L Final   10/31/2023 98 98 - 107 mmol/L Final      Bicarbonate No results found for: \"PLASMABICARB\"   BUN BUN   Date Value Ref Range Status   11/01/2023 11 8 - 23 mg/dL Final   10/31/2023 9 8 - 23 mg/dL Final      Creatinine Creatinine   Date Value Ref Range Status   11/01/2023 0.75 (L) 0.76 - 1.27 mg/dL Final   10/31/2023 0.72 (L) 0.76 - 1.27 mg/dL Final      Calcium Calcium   Date Value Ref Range Status   11/01/2023 8.5 (L) 8.6 - 10.5 mg/dL Final   10/31/2023 8.7 8.6 - 10.5 mg/dL Final      Glucose      No components found for: \"GLUCOSE.*\"      FL Surgery Fluoro   Final Result      XR Hip With or Without Pelvis 2 - 3 View Right   Final Result       1. Acute intertrochanteric fracture of the right proximal femur.                    VIV RODRIGUEZ MD          Electronically Signed and Approved By: VIV RODRIGUEZ MD on 10/29/2023 at 21:33                                "

## 2023-11-02 NOTE — THERAPY TREATMENT NOTE
Patient Name: Colton Chiang  : 1960    MRN: [id number]                              Today's Date: 2023       Admit Date: 10/29/2023    Visit Dx:     ICD-10-CM ICD-9-CM   1. Closed intertrochanteric fracture of hip, right, initial encounter  S72.141A 820.21   2. Fall on same level from slipping, tripping or stumbling, initial encounter  W01.0XXA E885.9   3. Difficulty walking  R26.2 719.7   4. Decreased activities of daily living (ADL)  Z78.9 V49.89   5. Chronic obstructive pulmonary disease, unspecified COPD type  J44.9 496     Patient Active Problem List   Diagnosis    HTN (hypertension)    ED (erectile dysfunction)    History of CVA (cerebrovascular accident)    Class 1 obesity due to excess calories with serious comorbidity and body mass index (BMI) of 32.0 to 32.9 in adult    Primary osteoarthritis involving multiple joints    Hyperlipidemia LDL goal <100    Seasonal allergies    Acquired hypothyroidism    History of prostate cancer    Cigarette nicotine dependence with nicotine-induced disorder    Chronic diastolic (congestive) heart failure    Iron deficiency anemia    RLS (restless legs syndrome)    Erysipelas    Prostate cancer    Right leg weakness    Fall    Closed intertrochanteric fracture of hip, right, initial encounter     Past Medical History:   Diagnosis Date    Allergies     Arthritis     Broken bones     Cracked    CHF (congestive heart failure)     Chronic back pain     Deafness, bilateral     Depression     HBP (high blood pressure)     Heart disease     High cholesterol     History of radiation therapy     Hypothyroid 02/10/2017    Prostate cancer 2016    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, bilateral     Stroke     Thyroid disorder      Past Surgical History:   Procedure Laterality Date    COLONOSCOPY      HIP TROCHANTERIC NAILING WITH INTRAMEDULLARY HIP SCREW Right 10/30/2023    Procedure: HIP TROCHANTERIC NAILING WITH INTRAMEDULLARY HIP SCREW;  Surgeon: Sandra Wolfe MD;  Location:   [facility];  Service: Orthopedics;  Laterality: Right;    PROSTATE BIOPSY        General Information       Row Name 11/02/23 0914          OT Time and Intention    Document Type therapy note (daily note)  -PG     Mode of Treatment individual therapy;occupational therapy  -PG               User Key  (r) = Recorded By, (t) = Taken By, (c) = Cosigned By      Initials Name Provider Type    PG Sheldon Mahmood OT Occupational Therapist                     Mobility/ADL's       Row Name 11/02/23 0914          Transfers    Transfers bed-chair transfer;toilet transfer  -PG       Row Name 11/02/23 0914          Bed-Chair Transfer    Bed-Chair [place] (Transfers) contact guard;verbal cues  -PG     Assistive Device (Bed-Chair Transfers) walker, front-wheeled  -PG       Row Name 11/02/23 0914          Toilet Transfer    Type (Toilet Transfer) sit-stand;stand-sit  -PG     [place] Level (Toilet Transfer) contact guard;verbal cues  -PG     Assistive Device (Toilet Transfer) walker, front-wheeled  -PG       Row Name 11/02/23 0914          Activities of Daily Living    BADL Assessment/Intervention bathing;upper body dressing;lower body dressing;grooming;toileting  -PG       Row Name 11/02/23 0914          Bathing Assessment/Intervention    [place] Level (Bathing) bathing skills;lower body;minimum assist (75% patient effort);verbal cues  -PG     Position (Bathing) unsupported sitting;supported standing  -PG       Row Name 11/02/23 0914          Upper Body Dressing Assessment/Training    [place] Level (Upper Body Dressing) upper body dressing skills;set up  -PG       Row Name 11/02/23 0914          Lower Body Dressing Assessment/Training    [place] Level (Lower Body Dressing) lower body dressing skills;doff;don;pants/bottoms;socks;minimum assist (75% patient effort);verbal cues  -PG     Position (Lower Body Dressing) unsupported sitting;supported standing  -PG       Row Name 11/02/23 0914          Grooming  Assessment/Training    [place] Level (Grooming) grooming skills;set up  -PG       Row Name 11/02/23 0914          Toileting Assessment/Training    [place] Level (Toileting) toileting skills;minimum assist (75% patient effort)  -PG     Position (Toileting) supported standing  -PG               User Key  (r) = Recorded By, (t) = Taken By, (c) = Cosigned By      Initials Name Provider Type    PG Sheldon Mahmood, BRODERICK Occupational Therapist                   Obj/Interventions    No documentation.                  Goals/Plan    No documentation.                  Clinical Impression       Row Name 11/02/23 0915          Plan of Care Review    Progress improving  -PG     Outcome Evaluation Patient now performing lower body self-care with minimal assist and walking to and from the bathroom for toileting with contact-guard assistance and rolling walker for safety.  Patient remains a high fall risk should he return home at this time and recommend skilled nursing rehab for 1 week to improve his overall strength and independence with ADL performance.  -PG       Row Name 11/02/23 0915          Positioning and Restraints    Post Treatment Position chair  -PG     In Chair exit alarm on  -PG               User Key  (r) = Recorded By, (t) = Taken By, (c) = Cosigned By      Initials Name Provider Type    PG Sheldon Mahmood, OT Occupational Therapist                   Outcome Measures       Row Name 11/02/23 0916          How much help from another is currently needed...    Putting on and taking off regular lower body clothing? 3  -PG     Bathing (including washing, rinsing, and drying) 3  -PG     Toileting (which includes using toilet bed pan or urinal) 3  -PG     Putting on and taking off regular upper body clothing 4  -PG     Taking care of personal grooming (such as brushing teeth) 4  -PG     Eating meals 4  -PG     AM-PAC 6 Clicks Score (OT) 21  -PG       Row Name 11/01/23 7709          How much help from another person do  you currently need...    Turning from your back to your side while in flat bed without using bedrails? 4  -SV     Moving from lying on back to sitting on the side of a flat bed without bedrails? 4  -SV     Moving to and from a bed to a chair (including a wheelchair)? 3  -SV     Standing up from a chair using your arms (e.g., wheelchair, bedside chair)? 3  -SV     Climbing 3-5 steps with a railing? 2  -SV     To walk in hospital room? 3  -SV     AM-PAC 6 Clicks Score (PT) 19  -SV     Highest level of mobility 6 --> Walked 10 steps or more  -SV       Row Name 11/02/23 0916          Functional Assessment    Outcome Measure Options AM-PAC 6 Clicks Daily Activity (OT);Optimal Instrument  -PG       Row Name 11/02/23 0916          Optimal Instrument    Optimal Instrument Optimal - 3  -PG     Bending/Stooping 3  -PG     Standing 2  -PG     Reaching 2  -PG               User Key  (r) = Recorded By, (t) = Taken By, (c) = Cosigned By      Initials Name Provider Type    SV Jazlyn Green, RN Registered Nurse    Sheldon Wu OT Occupational Therapist                    Occupational Therapy Education       Title: PT OT SLP Therapies (Done)       Topic: Occupational Therapy (Done)       Point: ADL training (Done)       Description:   Instruct learner(s) on proper safety adaptation and remediation techniques during self care or transfers.   Instruct in proper use of assistive devices.                  Learning Progress Summary             Patient Acceptance, E, VU by  at 11/1/2023 1301                         Point: Home exercise program (Done)       Description:   Instruct learner(s) on appropriate technique for monitoring, assisting and/or progressing therapeutic exercises/activities.                  Learning Progress Summary             Patient Acceptance, E, VU by  at 11/1/2023 1301                         Point: Precautions (Done)       Description:   Instruct learner(s) on prescribed precautions during self-care and  functional transfers.                  Learning Progress Summary             Patient Acceptance, E, VU by  at 11/1/2023 1301                         Point: Body mechanics (Done)       Description:   Instruct learner(s) on proper positioning and spine alignment during self-care, functional mobility activities and/or exercises.                  Learning Progress Summary             Patient Acceptance, E, VU by  at 11/1/2023 1301                                         User Key       Initials Effective Dates Name Provider Type Discipline     06/16/21 -  Lisette Evans OT Occupational Therapist OT                  OT Recommendation and Plan     Plan of Care Review  Progress: improving  Outcome Evaluation: Patient now performing lower body self-care with minimal assist and walking to and from the bathroom for toileting with contact-guard assistance and rolling walker for safety.  Patient remains a high fall risk should he return home at this time and recommend skilled nursing rehab for 1 week to improve his overall strength and independence with ADL performance.     Time Calculation:         Time Calculation- OT       Row Name 11/02/23 0917             Time Calculation- OT    OT Received On 11/02/23  -PG      OT Goal Re-Cert Due Date 11/09/23  -PG         Timed Charges    86154 - OT Therapeutic Activity Minutes 10  -PG      70098 - OT Self Care/Mgmt Minutes 30  -PG         Total Minutes    Timed Charges Total Minutes 40  -PG       Total Minutes 40  -PG                User Key  (r) = Recorded By, (t) = Taken By, (c) = Cosigned By      Initials Name Provider Type    PG Sheldon Mahmood OT Occupational Therapist                  Therapy Charges for Today       Code Description Service Date Service Provider Modifiers Qty    45792692854 HC OT THERAPEUTIC ACT EA 15 MIN 11/2/2023 Sheldon Mahmood OT GO 1    47183893609 HC OT SELF CARE/MGMT/TRAIN EA 15 MIN 11/2/2023 Sheldon Mahmood OT GO 2                 Sheldon Mahmood  OT  11/2/2023

## 2023-11-02 NOTE — NURSING NOTE
Discharge instructions reviewed with patient, and wife at bedside. Verbalized understanding of discharge instructions, with no questions or concerns voiced.

## 2023-11-02 NOTE — OUTREACH NOTE
Prep Survey      Flowsheet Row Responses   Congregational facility patient discharged from? [facility]   Is LACE score < 7 ? No   Eligibility [facility]   Date of Admission 11/29/23   Date of Discharge 11/02/23   Discharge Disposition Home-Health Care [facility] [place]   Discharge diagnosis Closed intertrochanteric fracture of hip, right, initial encounter right hip trochanteric nailing   Does the patient have one of the following disease processes/diagnoses(primary or secondary)? General Surgery   Does the patient have Home health ordered? Yes   What is the Home health agency?  [facility]   Is there a DME ordered? No   Prep survey completed? Yes            YULIYA GREEN - Registered Nurse

## 2023-11-02 NOTE — DISCHARGE SUMMARY
[facility]         HOSPITALIST  DISCHARGE SUMMARY    Patient Name: Colton Chiang  : 1960  MRN: [id number]    Date of Admission: 10/29/2023  Date of Discharge:  2023    Primary Care Physician: Eduarda Butt DO    Consults       Date and Time Order Name Status Description    10/29/2023 10:45 PM Inpatient Orthopedic Surgery Consult Completed     10/29/2023 10:45 PM Hospitalist (on-call MD unless specified)              Active and Resolved Hospital Problems:  Active Hospital Problems    Diagnosis POA   • **Fall [W19.XXXA] Yes   • Closed intertrochanteric fracture of hip, right, initial encounter [S72.141A] Unknown   • HTN (hypertension) [I10] Yes      Resolved Hospital Problems   No resolved problems to display.       Hospital Course     Hospital Course:  Colton Chiang is a 63 y.o. male with past medical history significant for HTN, hypothyroidism, HLD, prostate cancer s/p radiation, and tobacco abuse who presented after a fall. Patient [facility] when the fall happened.  He slipped over something and fell on his right side.  He denies hitting his head.  He is not on anticoag.  He has pain on his right hip following the fall.  Patient was admitted to the hospital.  Patient is status post right hip trochanteric nailing on .  Patient tolerated the procedure well and has been working good with physical therapy.  [facility] however his insurance denied this and stated that he could go to a subacute rehab however patient has refused this and has now decided to go home with home health services and his wife.  Patient will be discharged home today in stable condition    The patient needs a bedside commode at home for the following reason: the patient is confined to one level of the home environment and there is no toilet on that level.          DISCHARGE Follow Up Recommendations for labs and diagnostics:   Follow up with ortho as  scheduled   Continue folllow up PET scan for monitoring of prostate       Day of Discharge     Vital Signs:  Temp:  [97.3 °F (36.3 °C)-98.6 °F (37 °C)] 97.8 °F (36.6 °C)  Heart Rate:  [64-76] 69  Resp:  [16-18] 18  BP: (109-134)/(60-67) 116/62  Physical Exam:   Gen: NAD, Alert and Oriented. Resting in bedside chair  Cards: RRR, no murmur   Pulm: CTA b/l, no wheezing  Abd: soft, nondistended  Extremities: no pitting edema  Skin: clean and dry          Discharge Details        Discharge Medications        New Medications        Instructions Start Date   apixaban 2.5 MG tablet tablet  Commonly known as: ELIQUIS   2.5 mg, Oral, Every 12 Hours Scheduled, Once eliquis is completed, he may then restart his baby asa daily      HYDROcodone-acetaminophen 7.5-325 MG per tablet  Commonly known as: Norco   1-2 tablets, Oral, Every 4 Hours PRN             Changes to Medications        Instructions Start Date   FeroSul 325 (65 Fe) MG tablet  Generic drug: ferrous sulfate  What changed:   how much to take  when to take this   TAKE 1 TABLET BY MOUTH EVERY DAY      furosemide 20 MG tablet  Commonly known as: LASIX  What changed: Another medication with the same name was changed. Make sure you understand how and when to take each.   20 mg, Oral, Every Other Day      furosemide 40 MG tablet  Commonly known as: LASIX  What changed:   how much to take  how to take this  when to take this   Take a half a tab 1 day and a whole tab the next day.  Can take an extra half on a as needed basis      levothyroxine 137 MCG tablet  Commonly known as: SYNTHROID, LEVOTHROID  What changed:   how much to take  how to take this  when to take this   Take 2 tablets in the am 30 minutes before breakfast.      metoprolol succinate XL 25 MG 24 hr tablet  Commonly known as: TOPROL-XL  What changed:   how much to take  how to take this  when to take this   Take 1/2 tab po qd             Continue These Medications        Instructions Start Date   Co Q-10 300 MG  capsule   1 capsule, Oral, Every Other Day      liothyronine 5 MCG tablet  Commonly known as: CYTOMEL   TAKE 2 TABLETS DAILY      Magnesium 250 MG tablet   250 mg, Oral, Daily      multivitamin with minerals tablet tablet   1 tablet, Oral, Daily      Vitamin C 500 MG capsule   1 capsule, Oral, Daily      Vitamin D3 50 MCG (2000 UT) capsule   TAKE 1 CAPSULE BY MOUTH EVERY DAY      Zinc 50 MG capsule   50 mg, Oral, Daily             Stop These Medications      Aspirin Low Dose 81 MG EC tablet  Generic drug: aspirin              Allergies   Allergen Reactions   • Penicillin G Hives     Reaction as a kid   • Penicillins Irritability       Discharge Disposition:  Home-Health Care [facility] [place] [facility]    Diet:  Hospital:  Diet Order   Procedures   • Diet: Regular/House Diet; Texture: Regular Texture (IDDSI 7); Fluid Consistency: Thin (IDDSI 0)       Discharge Activity:   Activity Instructions       Up WIth Assist              CODE STATUS:  Code Status and Medical Interventions:   Ordered at: 10/29/23 9580     Level Of Support Discussed With:    Patient     Code Status (Patient has no pulse and is not breathing):    CPR (Attempt to Resuscitate)     Medical Interventions (Patient has pulse or is breathing):    Full Support         Future Appointments   Date Time Provider Department Center   11/14/2023 10:45 AM Priscilla Sinha APRN [facility] [place] [facility] ORS RING [facility]   11/20/2023  7:30 AM Eduarda Butt DO [facility] PC MARIN [facility]   11/30/2023  9:00 AM Jake Donaldson MD [facility] NS ETOWN [facility]   2/20/2024  8:45 AM Colin Concepcion MD [facility]       Additional Instructions for the Follow-ups that You Need to Schedule       Ambulatory Referral to Home Health (Outpatient)   As directed      Face to Face Visit Date: 11/2/2023   Follow-up provider for Plan of Care?: I will be treating the patient on an ongoing basis.  Please send me the Plan of Care for signature.   Follow-up provider: DE ALEX [[id number]]   Reason/Clinical Findings: s/p hip fx   Describe  mobility limitations that make leaving home difficult: s/p hip fx   Nursing/Therapeutic Services Requested: Skilled Nursing Physical Therapy Occupational Therapy   Skilled nursing orders: Pain management Wound care dressing/changes   PT orders: Therapeutic exercise Gait Training Strengthening   Weight Bearing Status: As Tolerated   Occupational orders: Activities of daily living Home safety assessment Energy conservation Strengthening   Frequency: 1 Week 1        Discharge Follow-up with Specified Provider: Dr Wolfe's office; 2 Weeks   As directed      To: Dr Wolfe's office   Follow Up: 2 Weeks                Pertinent  and/or Most Recent Results     PROCEDURES:   Right hip repair     LAB RESULTS:      Lab 11/01/23  0405 10/31/23  0401 10/30/23  0402 10/29/23  2139   WBC 8.58  --  9.32 12.12*   HEMOGLOBIN 11.5* 11.0* 11.3* 12.8*   HEMATOCRIT 36.2* 35.5* 36.3* 39.6   PLATELETS 220  --  240 255   NEUTROS ABS  --   --   --  10.04*   IMMATURE GRANS (ABS)  --   --   --  0.05   LYMPHS ABS  --   --   --  0.95   MONOS ABS  --   --   --  0.86   EOS ABS  --   --   --  0.18   MCV 86.2  --  87.1 85.3   APTT  --   --   --  34.6*         Lab 11/01/23  0405 10/31/23  0401 10/30/23  0402 10/29/23  2139   SODIUM 136 133* 134* 132*   POTASSIUM 4.2 4.5 4.1 4.3   CHLORIDE 98 98 97* 95*   CO2 30.8* 28.7 28.5 27.8   ANION GAP 7.2 6.3 8.5 9.2   BUN 11 9 15 15   CREATININE 0.75* 0.72* 0.57* 0.73*   EGFR 100.1 101.4 108.8 101.0   GLUCOSE 99 165* 131* 103*   CALCIUM 8.5* 8.7 8.5* 8.9   MAGNESIUM 1.8  --  1.9  --    PHOSPHORUS  --   --  3.8  --    TSH  --   --   --  2.530         Lab 10/30/23  0402 10/29/23  2139   TOTAL PROTEIN 6.4 7.3   ALBUMIN 3.2* 3.8   GLOBULIN 3.2 3.5   ALT (SGPT) 25 29   AST (SGOT) 22 26   BILIRUBIN 0.2 0.3   ALK PHOS 84 98   LIPASE  --  14                     Brief Urine Lab Results  (Last result in the past 365 days)        Color   Clarity   Blood   Leuk Est   Nitrite   Protein   CREAT   Urine HCG        08/14/23  0825 Yellow   Clear   Negative   Negative   Negative   Negative                 Microbiology Results (last 10 days)       ** No results found for the last 240 hours. **            XR Hip With or Without Pelvis 2 - 3 View Right    Result Date: 10/29/2023    1. Acute intertrochanteric fracture of the right proximal femur.      VIV RODRIGUEZ MD       Electronically Signed and Approved By: VIV RODRIGUEZ MD on 10/29/2023 at 21:33                           Labs Pending at Discharge:        Time spent on Discharge including face to face service:  more than 35 minutes    Electronically signed by Juan Pablo Casillas DO, 11/02/23, 11:18 AM EDT.

## 2023-11-02 NOTE — PLAN OF CARE
Goal Outcome Evaluation:  Plan of Care Reviewed With: patient        Progress: no change   Pt. Is a one person assist with walker and has ambulated from recliner to bed 5 feet this shift.  Voiding without difficulty.  Pt. Has been medicated x 1 for pain with relief noted.  No significant changes noted this shift.  Upon discharge pt is wanting inpatient rehab versus home health.

## 2023-11-02 NOTE — THERAPY TREATMENT NOTE
Acute Care - Physical Therapy Treatment Note  BOOGIE Yoon     Patient Name: Colton Chiang  : 1960  MRN: [id number]  Today's Date: 2023      Visit Dx:     ICD-10-CM ICD-9-CM   1. Closed intertrochanteric fracture of hip, right, initial encounter  S72.141A 820.21   2. Fall on same level from slipping, tripping or stumbling, initial encounter  W01.0XXA E885.9   3. Difficulty walking  R26.2 719.7   4. Decreased activities of daily living (ADL)  Z78.9 V49.89   5. Chronic obstructive pulmonary disease, unspecified COPD type  J44.9 496   6. Difficulty in walking  R26.2 719.7     Patient Active Problem List   Diagnosis    HTN (hypertension)    ED (erectile dysfunction)    History of CVA (cerebrovascular accident)    Class 1 obesity due to excess calories with serious comorbidity and body mass index (BMI) of 32.0 to 32.9 in adult    Primary osteoarthritis involving multiple joints    Hyperlipidemia LDL goal <100    Seasonal allergies    Acquired hypothyroidism    History of prostate cancer    Cigarette nicotine dependence with nicotine-induced disorder    Chronic diastolic (congestive) heart failure    Iron deficiency anemia    RLS (restless legs syndrome)    Erysipelas    Prostate cancer    Right leg weakness    Fall    Closed intertrochanteric fracture of hip, right, initial encounter     Past Medical History:   Diagnosis Date    Allergies     Arthritis     Broken bones     Cracked    CHF (congestive heart failure)     Chronic back pain     Deafness, bilateral     Depression     HBP (high blood pressure)     Heart disease     High cholesterol     History of radiation therapy     Hypothyroid 02/10/2017    Prostate cancer 2016    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, bilateral     Stroke     Thyroid disorder      Past Surgical History:   Procedure Laterality Date    COLONOSCOPY      HIP TROCHANTERIC NAILING WITH INTRAMEDULLARY HIP SCREW Right 10/30/2023    Procedure: HIP TROCHANTERIC NAILING WITH INTRAMEDULLARY HIP SCREW;   Surgeon: aSndra Wolfe MD;  Location: [facility] MAIN OR;  Service: Orthopedics;  Laterality: Right;    PROSTATE BIOPSY       PT Assessment (last 12 hours)       PT Evaluation and Treatment       Row Name 11/02/23 1100          Physical Therapy Time and Intention    Subjective Information complains of;weakness;pain (P)   -ZT     Document Type therapy note (daily note) (P)   -ZT     Mode of Treatment individual therapy;physical therapy (P)   -ZT     Patient Effort good (P)   -ZT       Row Name 11/02/23 1100          General Information    Patient Profile Reviewed yes (P)   -ZT     Patient Observations alert;cooperative;agree to therapy (P)   -ZT     Existing Precautions/Restrictions fall (P)   -ZT       Row Name 11/02/23 1100          Transfers    Transfers sit-stand transfer;stand-sit transfer (P)   -ZT       Row Name 11/02/23 1100          Sit-Stand Transfer    Sit-Stand [place] (Transfers) contact guard (P)   -ZT     Assistive Device (Sit-Stand Transfers) walker, front-wheeled (P)   -ZT       Row Name 11/02/23 1100          Stand-Sit Transfer    Stand-Sit [place] (Transfers) contact guard (P)   -ZT     Assistive Device (Stand-Sit Transfers) walker, front-wheeled (P)   -ZT       Row Name 11/02/23 1100          Gait/Stairs (Locomotion)    Gait/Stairs Locomotion gait/ambulation assistive device (P)   -ZT     [place] Level (Gait) contact guard (P)   -ZT     Assistive Device (Gait) walker, front-wheeled (P)   -ZT     Distance in Feet (Gait) 250 (P)   -ZT     Negotiation (Stairs) stairs independence;other (see comments) (P)   Handrails on both sides  -ZT     [place] Level (Stairs) contact guard (P)   -ZT       Row Name 11/02/23 1100          Safety Issues, Functional Mobility    Impairments Affecting Function (Mobility) balance;endurance/activity tolerance;strength (P)   -ZT       Row Name 11/02/23 1100          Balance    Balance Assessment standing dynamic balance (P)   -ZT     Dynamic Standing  Balance contact guard (P)   -ZT     Position/Device Used, Standing Balance walker, front-wheeled (P)   -ZT       Row Name             Wound 10/30/23 0047 Right distal leg Other (comment)    Wound - Properties Group Placement Date: 10/30/23  -RK Placement Time: 0047  -RK Present on Original Admission: Y  -RK Side: Right  -RK Orientation: distal  -RK Location: leg  -RK Primary Wound Type: Other  -RK    Retired Wound - Properties Group Placement Date: 10/30/23  -RK Placement Time: 0047  -RK Present on Original Admission: Y  -RK Side: Right  -RK Orientation: distal  -RK Location: leg  -RK Primary Wound Type: Other  -RK    Retired Wound - Properties Group Date first assessed: 10/30/23  -RK Time first assessed: 0047  -RK Present on Original Admission: Y  -RK Side: Right  -RK Location: leg  -RK Primary Wound Type: Other  -RK      Row Name             Wound 10/30/23 1404 Right anterior greater trochanter Incision    Wound - Properties Group Placement Date: 10/30/23  -MH Placement Time: 1404  -MH Side: Right  -MH Orientation: anterior  -MH Location: greater trochanter  -MH Primary Wound Type: Incision  -MH    Retired Wound - Properties Group Placement Date: 10/30/23  -MH Placement Time: 1404  -MH Side: Right  -MH Orientation: anterior  -MH Location: greater trochanter  -MH Primary Wound Type: Incision  -MH    Retired Wound - Properties Group Date first assessed: 10/30/23  - Time first assessed: 1404  -MH Side: Right  -MH Location: greater trochanter  -MH Primary Wound Type: Incision  -MH      Row Name 11/02/23 1100          Plan of Care Review    Plan of Care Reviewed With patient;spouse (P)   -ZT       Row Name 11/02/23 1100          Positioning and Restraints    Pre-Treatment Position sitting in chair/recliner (P)   -ZT     Post Treatment Position chair (P)   -ZT     In Chair call light within reach;encouraged to call for assist;exit alarm on (P)   -ZT       Row Name 11/02/23 1100          Therapy Assessment/Plan (PT)     Rehab Potential (PT) good, to achieve stated therapy goals (P)   -ZT     Criteria for Skilled Interventions Met (PT) yes;meets criteria;skilled treatment is necessary (P)   -ZT     Therapy Frequency (PT) daily (P)   -ZT     Predicted Duration of Therapy Intervention (PT) 10 days (P)   -ZT     Problem List (PT) problems related to;balance;mobility;strength (P)   -ZT     Activity Limitations Related to Problem List (PT) unable to ambulate safely;unable to transfer safely (P)   -ZT       Row Name 11/02/23 1100          Progress Summary (PT)    Progress Toward Functional Goals (PT) progress toward functional goals is good (P)   -ZT     Daily Progress Summary (PT) Pt presents with the ability to tolerate functional activity, he just has decreased endurance. He was able to walk 250' today, but he required multiple - frequent rest breaks to catch his breath. He continues to require skilled PT services to address these deficits so that he can safely return to his PLOF. (P)   -ZT       Row Name 11/02/23 1100          Therapy Plan Review/Discharge Plan (PT)    Therapy Plan Review (PT) evaluation/treatment results reviewed;care plan/treatment goals reviewed;participants included;patient;spouse/significant other (P)   -ZT               User Key  (r) = Recorded By, (t) = Taken By, (c) = Cosigned By      Initials Name Provider Type    RK Malia Chavez, RN Registered Nurse    Julian Pascual RN Registered Nurse    ZT Dino Chao, PT Student PT Student                      PT Recommendation and Plan  Anticipated Discharge Disposition (PT): (P) inpatient rehabilitation facility  Planned Therapy Interventions (PT): (P) balance training, bed mobility training, gait training, stair training, strengthening, transfer training  Therapy Frequency (PT): (P) daily  Progress Summary (PT)  Progress Toward Functional Goals (PT): (P) progress toward functional goals is good  Daily Progress Summary (PT): (P) Pt presents with the  ability to tolerate functional activity, he just has decreased endurance. He was able to walk 250' today, but he required multiple - frequent rest breaks to catch his breath. He continues to require skilled PT services to address these deficits so that he can safely return to his PLOF.  Plan of Care Reviewed With: (P) patient, spouse   Outcome Measures       Row Name 11/02/23 1100 11/01/23 1500 10/31/23 1100       How much help from another person do you currently need...    Turning from your back to your side while in flat bed without using bedrails? 4 (P)   -ZT 4  -RH 4  -DP    Moving from lying on back to sitting on the side of a flat bed without bedrails? 4 (P)   -ZT 4  -RH 4  -DP    Moving to and from a bed to a chair (including a wheelchair)? 3 (P)   -ZT 3  -RH 3  -DP    Standing up from a chair using your arms (e.g., wheelchair, bedside chair)? 3 (P)   -ZT 3  -RH 2  -DP    Climbing 3-5 steps with a railing? 3 (P)   -ZT 2  -RH 2  -DP    To walk in hospital room? 3 (P)   -ZT 3  -RH 3  -DP    AM-PAC 6 Clicks Score (PT) 20 (P)   -ZT 19  -RH 18  -DP    Highest level of mobility 6 --> Walked 10 steps or more (P)   -ZT 6 --> Walked 10 steps or more  -RH 6 --> Walked 10 steps or more  -DP              User Key  (r) = Recorded By, (t) = Taken By, (c) = Cosigned By      Initials Name Provider Type    RH Jalil Salazar, PTA Physical Therapist Assistant    Aviva Cornejo, PT Physical Therapist    ZT Dino Chao, PT Student PT Student                     Time Calculation:    PT Charges       Row Name 11/02/23 1151             Time Calculation    PT Received On 11/02/23 (P)   -ZT         Timed Charges    18360 - Gait Training Minutes  15 (P)   -ZT         Total Minutes    Timed Charges Total Minutes 15 (P)   -ZT       Total Minutes 15 (P)   -ZT                User Key  (r) = Recorded By, (t) = Taken By, (c) = Cosigned By      Initials Name Provider Type    ZT Dino Chao, PT Student PT Student                       PT G-Codes  Outcome Measure Options: AM-PAC 6 Clicks Daily Activity (OT), Optimal Instrument  AM-PAC 6 Clicks Score (PT): (P) 20  AM-PAC 6 Clicks Score (OT): 21    Dino Chao, PT Student  11/2/2023

## 2023-11-03 ENCOUNTER — TRANSITIONAL CARE MANAGEMENT TELEPHONE ENCOUNTER (OUTPATIENT)
Dept: CALL CENTER | Facility: HOSPITAL | Age: 63
End: 2023-11-03
Payer: MEDICARE

## 2023-11-03 NOTE — OUTREACH NOTE
Call Center TCM Note      Flowsheet Row Responses   [facility] discharged from? [facility]   Does the patient have one of the following disease processes/diagnoses(primary or secondary)? General Surgery   TCM attempt successful? Yes   Call start time 1302   Call end time 1310   Discharge diagnosis Closed intertrochanteric fracture of hip, right, initial encounter right hip trochanteric nailing   Is patient permission given to speak with other caregiver? Yes   Person spoke with today (if not patient) and relationship Patient and he put wife on the phone   Meds reviewed with patient/caregiver? Yes   Does the patient have all medications related to this admission filled (includes all antibiotics, pain medications, etc.) Yes   Is the patient taking all medications as directed (includes completed medication regime)? Yes   Comments PCP DR Butt. Patient has a previously scheduled office visit in place for 11/20 and declines need to schedule earlier Hospital follow up appt.   Does the patient have an appointment with their PCP within 7-14 days of discharge? No   Nursing Interventions Patient declined scheduling/rescheduling appointment at this time, Routed TCM call to PCP office   What is the Home health agency?  [facility]   Has home health visited the patient within 72 hours of discharge? Yes   What DME was ordered? commode chair, walker   Has all DME been delivered? Yes   Psychosocial issues? No   Did the patient receive a copy of their discharge instructions? Yes   Nursing interventions Reviewed instructions with patient   What is the patient's perception of their health status since discharge? Improving   Is the patient /caregiver able to teach back basic post-op care? Keep incision areas clean,dry and protected   Is the patient/caregiver able to teach back signs and symptoms of incisional infection? Increased redness, swelling or pain at the incisonal site, Increased drainage or bleeding,  Fever  [Wife reports HH has changed hip dressing with visit.]   Is the patient/caregiver able to teach back steps to recovery at home? Set small, achievable goals for return to baseline health, Rest and rebuild strength, gradually increase activity, Eat a well-balance diet   Is the patient/caregiver able to teach back the hierarchy of who to call/visit for symptoms/problems? PCP, Specialist, Home health nurse, Urgent Care, ED, 911 Yes   TCM call completed? Yes   Wrap up additional comments Patient has orthopedic appt in place for 11/14/23   Call end time 1310   Would this patient benefit from a Referral to Amb Social Work? No   Is the patient interested in additional calls from an ambulatory ? No            Daphne Beaver RN    11/3/2023, 13:10 EDT

## 2023-11-10 ENCOUNTER — TELEPHONE (OUTPATIENT)
Dept: UROLOGY | Facility: CLINIC | Age: 63
End: 2023-11-10

## 2023-11-10 NOTE — TELEPHONE ENCOUNTER
CALLED PT TO RS CX APPT W/ROGER/SPOKE W/PT WIFE AND SHE SAID THAT HE BROKE HIS HIP AND CAN NOT RS/ANYTHING ELSE TO DO??

## 2023-11-14 ENCOUNTER — OFFICE VISIT (OUTPATIENT)
Dept: ORTHOPEDIC SURGERY | Facility: CLINIC | Age: 63
End: 2023-11-14
Payer: MEDICARE

## 2023-11-14 VITALS
SYSTOLIC BLOOD PRESSURE: 119 MMHG | HEIGHT: 71 IN | WEIGHT: 229 LBS | BODY MASS INDEX: 32.06 KG/M2 | DIASTOLIC BLOOD PRESSURE: 71 MMHG | HEART RATE: 71 BPM | OXYGEN SATURATION: 95 %

## 2023-11-14 DIAGNOSIS — S72.141A CLOSED INTERTROCHANTERIC FRACTURE OF HIP, RIGHT, INITIAL ENCOUNTER: Primary | ICD-10-CM

## 2023-11-14 DIAGNOSIS — S72.141A CLOSED INTERTROCHANTERIC FRACTURE OF HIP, RIGHT, INITIAL ENCOUNTER: ICD-10-CM

## 2023-11-14 DIAGNOSIS — Z47.89 AFTERCARE FOLLOWING SURGERY OF THE MUSCULOSKELETAL SYSTEM: ICD-10-CM

## 2023-11-14 PROCEDURE — 99024 POSTOP FOLLOW-UP VISIT: CPT

## 2023-11-14 RX ORDER — CEPHALEXIN 500 MG/1
500 CAPSULE ORAL EVERY 6 HOURS
Qty: 40 CAPSULE | Refills: 0 | Status: SHIPPED | OUTPATIENT
Start: 2023-11-14 | End: 2023-11-14 | Stop reason: SDUPTHER

## 2023-11-14 RX ORDER — CEPHALEXIN 500 MG/1
500 CAPSULE ORAL EVERY 6 HOURS
Qty: 40 CAPSULE | Refills: 0 | Status: SHIPPED | OUTPATIENT
Start: 2023-11-14

## 2023-11-14 RX ORDER — HYDROCODONE BITARTRATE AND ACETAMINOPHEN 7.5; 325 MG/1; MG/1
1 TABLET ORAL EVERY 4 HOURS PRN
Qty: 42 TABLET | Refills: 0 | Status: SHIPPED | OUTPATIENT
Start: 2023-11-14

## 2023-11-14 NOTE — PATIENT INSTRUCTIONS
X-rays reviewed, showing intact hardware. Sutures/staples removed in office, steri-strips applied. Keep incision clean and dry, allow steri-strips to fall off on their own in 7-10 days.  Educated on avoiding submersion in water until incision is fully healed. Continue PT and use of cane/walker until recommended by PT. Continue icing knee for approximately 15-20 minutes, three times daily, and as needed following PT.     Follow-up in 4 weeks. Repeat imaging needed at this visit.

## 2023-11-14 NOTE — PROGRESS NOTES
"Chief Complaint  Follow-up of the Right Hip and Suture / Staple Removal    Subjective      Colton Chiang presents to [facility] for 2-week follow-up of right hip intertrochanteric nailing and intramedullary hip screw with Dr. Wolfe on 10/30/2023.  Patient has been attending physical therapy with home health and would like to continue physical therapy with home health.  He is also needing a refill pain medication.  He is ambulatory with use of a walker.  Overall he is doing well.    Objective   Allergies   Allergen Reactions    Penicillin G Hives     Reaction as a kid    Penicillins Irritability       Vital Signs:   /71   Pulse 71   Ht 180.3 cm (71\")   Wt 104 kg (229 lb)   SpO2 95%   BMI 31.94 kg/m²       Physical Exam    Constitutional: Awake, alert. Well nourished appearance.    Integumentary: Warm, dry, intact. No obvious rashes.    HENT: Atraumatic, normocephalic.   Respiratory: Non labored respirations .   Cardiovascular: Intact peripheral pulses.    Psychiatric: Normal mood and affect. A&O X3    Ortho Exam  Right hip: Incision is well approximated and healing appropriately.  There is erythema surrounding incision is well as some tenderness to palpation.  No drainage noted.  4/5 strength to hip flexors, extensors, abductors and adductor's.  No pain elicited with internal/external rotation of the hip.  Neurovascular intact.  Sensation is intact.    Imaging Results (Most Recent)       Procedure Component Value Units Date/Time    XR Hip With or Without Pelvis 2 - 3 View Right [[id number]] Resulted: 11/15/23 0947     Updated: 11/15/23 0947    Narrative:      X-Ray Report:  Study: X-rays ordered, taken in the office, and reviewed today.   Site: Right hip xray  Indication: Right hip ORIF  View: AP/Lateral view(s)  Findings: Intact right hip ORIF. No evidence of hardware malfunction.    Fracture with stable lamina routine healing.  Prior studies available for comparison: yes  "                      Assessment and Plan   Problem List Items Addressed This Visit          Other    Closed intertrochanteric fracture of hip, right, initial encounter - Primary    Relevant Orders    XR Hip With or Without Pelvis 2 - 3 View Right (Completed)    Ambulatory Referral to Home Health (Outpatient) (Completed)     Other Visit Diagnoses       Aftercare following right hip trochanteric nailing with intramedullary hip screw                Follow Up   Return in about 4 weeks (around 12/12/2023).    Patient is a smoker, please discuss smoking cessation options with your PCP.    Social History     Socioeconomic History    Marital status:    Tobacco Use    Smoking status: Every Day     Packs/day: 2.00     Years: 45.00     Additional pack years: 0.00     Total pack years: 90.00     Types: Cigarettes     Start date: 1/1/1972    Smokeless tobacco: Never   Vaping Use    Vaping Use: Never used   Substance and Sexual Activity    Alcohol use: Never    Drug use: Never    Sexual activity: Defer       Patient Instructions   X-rays reviewed, showing intact hardware. Sutures/staples removed in office, steri-strips applied. Keep incision clean and dry, allow steri-strips to fall off on their own in 7-10 days.  Educated on avoiding submersion in water until incision is fully healed. Continue PT and use of cane/walker until recommended by PT. Continue icing knee for approximately 15-20 minutes, three times daily, and as needed following PT.     Follow-up in 4 weeks. Repeat imaging needed at this visit.    Patient was given instructions and counseling regarding his condition or for health maintenance advice. Please see specific information pulled into the AVS if appropriate.

## 2023-11-20 ENCOUNTER — TELEPHONE (OUTPATIENT)
Dept: ORTHOPEDIC SURGERY | Facility: CLINIC | Age: 63
End: 2023-11-20

## 2023-11-20 ENCOUNTER — OFFICE VISIT (OUTPATIENT)
Dept: FAMILY MEDICINE CLINIC | Facility: CLINIC | Age: 63
End: 2023-11-20
Payer: MEDICARE

## 2023-11-20 VITALS
RESPIRATION RATE: 18 BRPM | TEMPERATURE: 98 F | HEIGHT: 71 IN | WEIGHT: 232.3 LBS | OXYGEN SATURATION: 94 % | BODY MASS INDEX: 32.52 KG/M2 | SYSTOLIC BLOOD PRESSURE: 100 MMHG | HEART RATE: 72 BPM | DIASTOLIC BLOOD PRESSURE: 50 MMHG

## 2023-11-20 DIAGNOSIS — I10 PRIMARY HYPERTENSION: Primary | Chronic | ICD-10-CM

## 2023-11-20 DIAGNOSIS — I50.32 CHRONIC DIASTOLIC (CONGESTIVE) HEART FAILURE: Chronic | ICD-10-CM

## 2023-11-20 DIAGNOSIS — F17.219 CIGARETTE NICOTINE DEPENDENCE WITH NICOTINE-INDUCED DISORDER: Chronic | ICD-10-CM

## 2023-11-20 DIAGNOSIS — E66.09 CLASS 1 OBESITY DUE TO EXCESS CALORIES WITH SERIOUS COMORBIDITY AND BODY MASS INDEX (BMI) OF 32.0 TO 32.9 IN ADULT: Chronic | ICD-10-CM

## 2023-11-20 DIAGNOSIS — R60.0 LOWER EXTREMITY EDEMA: ICD-10-CM

## 2023-11-20 PROBLEM — R29.898 RIGHT LEG WEAKNESS: Status: RESOLVED | Noted: 2023-08-15 | Resolved: 2023-11-20

## 2023-11-20 PROBLEM — R29.898 WEAKNESS OF RIGHT LOWER EXTREMITY: Chronic | Status: ACTIVE | Noted: 2023-08-15

## 2023-11-20 PROBLEM — R29.898 WEAKNESS OF RIGHT LOWER EXTREMITY: Chronic | Status: RESOLVED | Noted: 2023-08-15 | Resolved: 2023-11-20

## 2023-11-20 RX ORDER — CEPHALEXIN 500 MG/1
500 CAPSULE ORAL 4 TIMES DAILY
Status: CANCELLED | OUTPATIENT
Start: 2023-11-20

## 2023-11-20 RX ORDER — LEVOTHYROXINE SODIUM 137 UG/1
TABLET ORAL
Qty: 180 TABLET | Refills: 1 | Status: SHIPPED | OUTPATIENT
Start: 2023-11-20

## 2023-11-20 NOTE — TELEPHONE ENCOUNTER
Caller: DIONI HAWKINS    Relationship to patient: Emergency Contact    Best call back number: [phone]    Patient is needing: PATIENT ONLY RECEIVED ONE PHYSICAL THERAPY APPOINTMENT - PLEASE REACH OUT AND ADVISE

## 2023-11-20 NOTE — PROGRESS NOTES
"Chief Complaint  Leg Pain (Right leg pain), Hypertension, and Hyperlipidemia    Subjective        Colton Chiang presents to [facility]  History of Present Illness  The patient is here today for a follow-up for the management of his chronic medical conditions.  He is  with one daughter.  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 He has a family history of breast cancer.     The patient is still continuing to smoke.     The patient is continue to take 2-5 mcg Cytomel and 2-125 mcg levothyroxine daily.  His most recent TSH on 8/9/22 was found to be 1.08.  He denies feeling any more fatigued than he usually does.  The patient works a lot of hours and stays tired a lot.     He does not check his blood pressure at home. He is followed by cardiology. He states his blood pressure is normally 90-100s/80s.     His right lower leg swelling, pain and redness is unchanged. He has been taking an extra 20 mg of lasix every other day to help with his swelling. He does not like compression stockings.     Since his last visit he has fallen and fracture his left hip.      The patient has no other complaints today and denies chest pain, shortness of breath, weakness, numbness, nausea, vomiting, diarrhea, dizziness or syncopal event.        Objective   Vital Signs:  /50 (BP Location: Left arm, Patient Position: Sitting, Cuff Size: Adult)   Pulse 72   Temp 98 °F (36.7 °C) (Tympanic)   Resp 18   Ht 180.3 cm (70.98\")   Wt 105 kg (232 lb 4.8 oz)   SpO2 94%   BMI 32.41 kg/m²   Estimated body mass index is 32.41 kg/m² as calculated from the following:    Height as of this encounter: 180.3 cm (70.98\").    Weight as of this encounter: 105 kg (232 lb 4.8 oz).               Physical Exam  Vitals " reviewed.   Constitutional:       Appearance: Normal appearance. He is well-developed.   HENT:      Head: Normocephalic and atraumatic.      Right Ear: External ear normal.      Left Ear: External ear normal.      Mouth/Throat:      Pharynx: No oropharyngeal exudate.   Eyes:      Conjunctiva/sclera: Conjunctivae normal.      Pupils: Pupils are equal, round, and reactive to light.   Neck:      Vascular: No carotid bruit.   Cardiovascular:      Rate and Rhythm: Normal rate and regular rhythm.      Heart sounds: No murmur heard.     No friction rub. No gallop.   Pulmonary:      Effort: Pulmonary effort is normal.      Breath sounds: Normal breath sounds. No wheezing or rhonchi.   Abdominal:      General: There is no distension.   Skin:     General: Skin is warm and dry.   Neurological:      Mental Status: He is alert and oriented to person, place, and time.      Cranial Nerves: No cranial nerve deficit.      Motor: No weakness.   Psychiatric:         Mood and Affect: Mood and affect normal.         Behavior: Behavior normal.         Thought Content: Thought content normal.         Judgment: Judgment normal.        Result Review :    CMP          10/30/2023    04:02 10/31/2023    04:01 11/1/2023    04:05   CMP   Glucose 131  165  99    BUN 15  9  11    Creatinine 0.57  0.72  0.75    EGFR 108.8  101.4  100.1    Sodium 134  133  136    Potassium 4.1  4.5  4.2    Chloride 97  98  98    Calcium 8.5  8.7  8.5    Total Protein 6.4      Albumin 3.2      Globulin 3.2      Total Bilirubin 0.2      Alkaline Phosphatase 84      AST (SGOT) 22      ALT (SGPT) 25      Albumin/Globulin Ratio 1.0      BUN/Creatinine Ratio 26.3  12.5  14.7    Anion Gap 8.5  6.3  7.2      CBC          10/30/2023    04:02 10/31/2023    04:01 11/1/2023    04:05   CBC   WBC 9.32   8.58    RBC 4.17   4.20    Hemoglobin 11.3  11.0  11.5    Hematocrit 36.3  35.5  36.2    MCV 87.1   86.2    MCH 27.1   27.4    MCHC 31.1   31.8    RDW 13.7   13.5    Platelets 240    220      Lipid Panel          6/23/2023    08:06   Lipid Panel   Total Cholesterol 142    Triglycerides 39    HDL Cholesterol 42    VLDL Cholesterol 9    LDL Cholesterol  91    LDL/HDL Ratio 2.20      TSH          6/23/2023    08:06 10/29/2023    21:39   TSH   TSH 4.000  2.530                   Assessment and Plan   Diagnoses and all orders for this visit:    1. Primary hypertension (Primary)  Assessment & Plan:  Hypertension is improving with treatment.  Continue current treatment regimen.  Dietary sodium restriction.  Weight loss.  Blood pressure will be reassessed at the next regular appointment.      2. Lower extremity edema  Assessment & Plan:  He will be continued on Lasix 40 mg daily. He was told to use compression on his legs. He was told that it is safe to take an extra half a lasix every other day.       3. Chronic diastolic (congestive) heart failure  Assessment & Plan:  Congestive heart failure due to hypertension.  Heart failure is improving with treatment.  NYHA Class I.  Continue current treatment regimen.  Dietary sodium restriction.  Stop smoking.  Heart failure will be reassessed in 6 months.      4. Cigarette nicotine dependence with nicotine-induced disorder  Assessment & Plan:  Tobacco use is unchanged.  Smoking cessation counseling was provided.  Tobacco use will be reassessed at the next regular appointment.      5. Class 1 obesity due to excess calories with serious comorbidity and body mass index (BMI) of 32.0 to 32.9 in adult  Assessment & Plan:  Patient's (Body mass index is 32.41 kg/m².) indicates that they are obese (BMI >30) with health conditions that include hypertension and dyslipidemias . Weight is worsening. BMI  is above average; BMI management plan is completed. We discussed low calorie, low carb based diet program, portion control, and increasing exercise.                Follow Up   Return in about 3 months (around 2/20/2024).  Patient was given instructions and counseling  regarding his condition or for health maintenance advice. Please see specific information pulled into the AVS if appropriate.

## 2023-11-20 NOTE — ASSESSMENT & PLAN NOTE
He will be continued on Lasix 40 mg daily. He was told to use compression on his legs. He was told that it is safe to take an extra half a lasix every other day.

## 2023-11-20 NOTE — ASSESSMENT & PLAN NOTE
Congestive heart failure due to hypertension.  Heart failure is improving with treatment.  NYHA Class I.  Continue current treatment regimen.  Dietary sodium restriction.  Stop smoking.  Heart failure will be reassessed in 6 months.

## 2023-11-20 NOTE — ASSESSMENT & PLAN NOTE
Patient's (Body mass index is 32.41 kg/m².) indicates that they are obese (BMI >30) with health conditions that include hypertension and dyslipidemias . Weight is worsening. BMI  is above average; BMI management plan is completed. We discussed low calorie, low carb based diet program, portion control, and increasing exercise.

## 2023-11-28 ENCOUNTER — TELEPHONE (OUTPATIENT)
Dept: UROLOGY | Facility: CLINIC | Age: 63
End: 2023-11-28
Payer: MEDICARE
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
## 2023-11-28 NOTE — TELEPHONE ENCOUNTER
I have updated the referral, copied and pasted this telephone encounter note into communications and routed back to scheduling so that pt is called to schedule. I will watch this to ensure it is scheduled in a timely manner

## 2023-11-29 NOTE — TELEPHONE ENCOUNTER
CALLED PT WIFE TO SCHEDULE F/U SORAYAAN APPT W/ORGER/PT WIFE ASKED TO CALL US BACK TOMORROW TO SCHEDULE APPT

## 2023-11-30 ENCOUNTER — OFFICE VISIT (OUTPATIENT)
Dept: NEUROSURGERY | Facility: CLINIC | Age: 63
End: 2023-11-30
Payer: MEDICARE

## 2023-11-30 ENCOUNTER — PATIENT ROUNDING (BHMG ONLY) (OUTPATIENT)
Dept: NEUROSURGERY | Facility: CLINIC | Age: 63
End: 2023-11-30
Payer: MEDICARE

## 2023-11-30 VITALS
DIASTOLIC BLOOD PRESSURE: 68 MMHG | HEIGHT: 71 IN | BODY MASS INDEX: 32.17 KG/M2 | WEIGHT: 229.8 LBS | SYSTOLIC BLOOD PRESSURE: 120 MMHG

## 2023-11-30 DIAGNOSIS — M54.16 SPINAL STENOSIS OF LUMBAR REGION WITH RADICULOPATHY: Primary | ICD-10-CM

## 2023-11-30 DIAGNOSIS — M48.061 SPINAL STENOSIS OF LUMBAR REGION WITH RADICULOPATHY: Primary | ICD-10-CM

## 2023-11-30 RX ORDER — ASPIRIN 81 MG/1
81 TABLET ORAL DAILY
COMMUNITY
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
## 2023-12-15 ENCOUNTER — OFFICE VISIT (OUTPATIENT)
Dept: ORTHOPEDIC SURGERY | Facility: CLINIC | Age: 63
End: 2023-12-15
Payer: MEDICARE

## 2023-12-15 VITALS — DIASTOLIC BLOOD PRESSURE: 78 MMHG | HEART RATE: 71 BPM | OXYGEN SATURATION: 92 % | SYSTOLIC BLOOD PRESSURE: 137 MMHG

## 2023-12-15 DIAGNOSIS — M25.551 RIGHT HIP PAIN: Primary | ICD-10-CM

## 2023-12-15 DIAGNOSIS — Z47.89 AFTERCARE FOLLOWING SURGERY OF THE MUSCULOSKELETAL SYSTEM: Primary | ICD-10-CM

## 2023-12-15 DIAGNOSIS — Z47.89 AFTERCARE FOLLOWING SURGERY OF THE MUSCULOSKELETAL SYSTEM: ICD-10-CM

## 2023-12-15 RX ORDER — HYDROCODONE BITARTRATE AND ACETAMINOPHEN 5; 325 MG/1; MG/1
1 TABLET ORAL EVERY 6 HOURS PRN
Qty: 28 TABLET | Refills: 0 | Status: SHIPPED | OUTPATIENT
Start: 2023-12-15

## 2023-12-15 RX ORDER — MELOXICAM 15 MG/1
15 TABLET ORAL DAILY
Qty: 30 TABLET | Refills: 2 | Status: SHIPPED | OUTPATIENT
Start: 2023-12-15

## 2023-12-15 NOTE — PATIENT INSTRUCTIONS
X-rays taken and reviewed with patient.    Advised to continue PT to completion to progress strength and ROM. Continue home exercises.     Refill pain medication sent to pharmacy.  Updated order to continue home health physical therapy placed today.    Continue icing hip as needed up to 4 times daily for no longer than 15-20 mins at a time.     Follow-up in  weeks. Repeat x-rays needed. Call with changes or concerns.

## 2023-12-15 NOTE — PROGRESS NOTES
"Chief Complaint  Follow-up of the Right Hip    Subjective      Colton Chiang presents to [facility] for follow-up of right hip trochanteric nailing with intramedullary hip screw with Dr. Wolfe on 10/30/2023.  Patient is continuing physical therapy with home health and is doing well.  He is ambulatory with use of a walker today.  Reports that he has been getting in and out of his truck without difficulty and driving without difficulty.  Patient is not currently taking pain medication but reports that he is \"eating the Tylenol and ibuprofen\".  He feels that he could use a refill of pain medication to take during therapy sessions.  Overall he is doing very well.    Objective   Allergies   Allergen Reactions    Penicillin G Hives     Reaction as a kid    Penicillins Irritability       Vital Signs:   /78   Pulse 71   SpO2 92%       Physical Exam    Constitutional: Awake, alert. Well nourished appearance.    Integumentary: Warm, dry, intact. No obvious rashes.    HENT: Atraumatic, normocephalic.   Respiratory: Non labored respirations .   Cardiovascular: Intact peripheral pulses.    Psychiatric: Normal mood and affect. A&O X3    Ortho Exam  Right hip: Incision is completely healed and well-approximated.  There is no redness, drainage or tenderness.  No edema noted throughout the extremity.  3/5 strength to hip flexion, 4/5 strength to hip extensors, abductors and adductor's.  No pain elicited with internal and external rotation of the hip.    Imaging Results (Most Recent)       Procedure Component Value Units Date/Time    XR Hip With or Without Pelvis 2 - 3 View Right [[id number]] Resulted: 12/15/23 0928     Updated: 12/15/23 0929    Narrative:      X-Ray Report:  Study: X-rays ordered, taken in the office, and reviewed today.   Site: Right hip xray  Indication: Right hip trochanteric nailing and intramedullary hip screw  View: AP/Lateral view(s)  Findings: Intact right hip ORIF. No " evidence of hardware malfunction.    Fracture is stable and healing routinely.  Prior studies available for comparison: yes                       Assessment and Plan   Problem List Items Addressed This Visit    None  Visit Diagnoses       Right hip pain    -  Primary    Relevant Orders    XR Hip With or Without Pelvis 2 - 3 View Right (Completed)    Ambulatory Referral to Home Health (Outpatient) (Completed)    Aftercare following right hip trochanteric nailing with intramedullary hip screw        Relevant Orders    Ambulatory Referral to Home Health (Outpatient) (Completed)            Follow Up   Return in about 6 weeks (around 1/26/2024).    Patient is a smoker, please discuss smoking cessation options with your PCP.    Social History     Socioeconomic History    Marital status:    Tobacco Use    Smoking status: Every Day     Packs/day: 2.00     Years: 45.00     Additional pack years: 0.00     Total pack years: 90.00     Types: Cigarettes     Start date: 1/1/1972    Smokeless tobacco: Never   Vaping Use    Vaping Use: Never used   Substance and Sexual Activity    Alcohol use: Never    Drug use: Never    Sexual activity: Defer       Patient Instructions   X-rays taken and reviewed with patient.    Advised to continue PT to completion to progress strength and ROM. Continue home exercises.     Refill pain medication sent to pharmacy.  Updated order to continue home health physical therapy placed today.    Continue icing hip as needed up to 4 times daily for no longer than 15-20 mins at a time.     Follow-up in  weeks. Repeat x-rays needed. Call with changes or concerns.    Patient was given instructions and counseling regarding his condition or for health maintenance advice. Please see specific information pulled into the AVS if appropriate.

## 2023-12-26 ENCOUNTER — HOSPITAL ENCOUNTER (OUTPATIENT)
Dept: PET IMAGING | Facility: HOSPITAL | Age: 63
Discharge: HOME | End: 2023-12-26
Payer: MEDICARE

## 2023-12-26 DIAGNOSIS — R97.21 INCREASING PROSTATE SPECIFIC ANTIGEN (PSA) LEVEL AFTER TREATMENT FOR MALIGNANT NEOPLASM OF PROSTATE: ICD-10-CM

## 2023-12-26 DIAGNOSIS — C61 MALIGNANT NEOPLASM OF PROSTATE: ICD-10-CM

## 2023-12-26 PROCEDURE — 78815 PET IMAGE W/CT SKULL-THIGH: CPT

## 2023-12-26 PROCEDURE — A9595 PIFLUFOLASTAT F 18 9 MCI SOLUTION PREFILLED SYRINGE: HCPCS | Performed by: UROLOGY

## 2023-12-26 PROCEDURE — 0 PIFLUFOLASTAT F 18 9 MCI SOLUTION PREFILLED SYRINGE: Performed by: UROLOGY

## 2023-12-26 RX ADMIN — PIFLUFOLASTAT F-18 1 DOSE: 80 INJECTION INTRAVENOUS at 10:55

## 2023-12-29 ENCOUNTER — TELEPHONE (OUTPATIENT)
Dept: ORTHOPEDIC SURGERY | Facility: CLINIC | Age: 63
End: 2023-12-29
Payer: MEDICARE

## 2023-12-29 NOTE — TELEPHONE ENCOUNTER
Caller: DIONI HAWKINS    Relationship: WIFE (ON VERBAL RELEASE OF INFO)    Best call back number: [phone]    What orders are you requesting (i.e. lab or imaging): OUT PATIENT PHYSICAL THERAPY ORDER FOR RIGHT HIP          Additional notes: PATIENT HAS COMPLETED HOME HEALTH PHYSICAL THERAPY

## 2024-01-01 NOTE — PROGRESS NOTES
Chief Complaint    Urologic complaint    Subjective          Colton Chiang presents to [facility]  History of Present Illness         63-year-old  gentleman       Prostatic adenocarcinoma    status post XRT in 2008 for Roxanne 8   ED      Patient broke right hip about 1 month ago.  Slowly getting better      12/27/2023 PSMA - focal area of uptake right prostatic base concerning for malignancy recurrence.  Cervical, supraclavicular and thoracic adenopathy mild radiotracer uptake.  Unchanged since 10/22.  Close follow-up recommended.  Mild increased uptake left humeral head.  Likely avascular necrosis based on noncontrast CT findings.  Mild uptake T10 vertebral body.  Indeterminate.  Attention to follow-up.  Extensive groundglass and pulmonary opacification throughout the bilateral lungs.  Similar to 10/22.  Pulmonary referral recommended.      Voiding okay.  No straining.  No change.   Nocturia every hour, bothersome  No prostate meds    Patient recently stopped his statin, it was giving him a lot of trouble side effects.  Doing better since     on Lasix many years in the morning.      Patient did do Lupron before when he did his original XRT      PVR    8/23    023    No cardiopulmonary history. He smokes 1.5 packs daily. Baby aspirin daily         Previous      History of CHF been well controlled for several years    Patient has had some lower extremity edema on the right, he has been ruled out for DVT      Has ED, it is worrisome.    Patient does have some hip pain.  He is getting hip MRI in the month      Patient has had no gross hematuria, dysuria or recurrent urinary tract infections. No history of kidney or bladder stone.     never had any urologic surgery.        10/22 CT abdomen/pelvis with - fractures of lateral right ninth 10th and 11th ribs.  Cholelithiasis        Prostate cancer history    12/27/2023 PSMA - focal area of uptake right prostatic base concerning for  malignancy recurrence.  Cervical, supraclavicular and thoracic adenopathy mild radiotracer uptake.  Unchanged since 10/22.  Close follow-up recommended.  Mild increased uptake left humeral head.  Likely avascular necrosis based on noncontrast CT findings.  Mild uptake T10 vertebral body.  Indeterminate.  Attention to follow-up.  Extensive groundglass and pulmonary opacification throughout the bilateral lungs.  Similar to 10/22.  Pulmonary referral recommended.    10/23    3.17     6/23      2.2  5/22      1.8  12/21    1.9  9/20     1.2  2/17    0.51  8/16    0.38  2/16    0.46  7/15    0.35  1/15   0.41  7/14    0.24  1/12    0.30  7/10    0.5  7/09     0.7  12/08   0.2  2008 XRT prostate  10/07 prostate biopsyprostatic adenocarcinoma, 5+3    9/07 17.2          Past History:  Medical History: has a past medical history of Allergies, Arthritis, Broken bones, CHF (congestive heart failure) (1990), Chronic back pain, Deafness, bilateral, Depression, HBP (high blood pressure), Heart disease, High cholesterol, History of radiation therapy, Hypothyroid (02/10/2017), Prostate cancer (02/05/2016), 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   Surgical History: has a past surgical history that includes Colonoscopy; Prostate biopsy; and Hip Trochanteric Nailing (Right, 10/30/2023).   Family History: family history includes Breast cancer in his sister; Diabetes in an other family member; Heart disease in an other family member.   Social History: reports that he has been smoking cigarettes. He started smoking about 52 years ago. He has a 90.00 pack-year smoking history. He has never used smokeless tobacco. He reports that he does not drink alcohol and does not use drugs.  Allergies: Penicillin g and Penicillins       Current Outpatient Medications:     Ascorbic Acid (Vitamin C) 500 MG capsule, Take 1 capsule by mouth Daily., Disp: , Rfl:     aspirin 81 MG EC tablet, Take 1 tablet by mouth Daily., Disp: , Rfl:      cephalexin (KEFLEX) 500 MG capsule, Take 1 capsule by mouth Every 6 (Six) Hours. (Patient not taking: Reported on 11/30/2023), Disp: 40 capsule, Rfl: 0    Cholecalciferol (Vitamin D3) 50 MCG (2000 UT) capsule, TAKE 1 CAPSULE BY MOUTH EVERY DAY (Patient taking differently: Take 1 capsule by mouth Daily.), Disp: 90 capsule, Rfl: 0    Coenzyme Q10 (Co Q-10) 300 MG capsule, Take 1 capsule by mouth Every Other Day. (Patient taking differently: Take 1 capsule by mouth Daily.), Disp: 30 each, Rfl: 5    FeroSul 325 (65 Fe) MG tablet, TAKE 1 TABLET BY MOUTH EVERY DAY (Patient taking differently: Take 1 tablet by mouth Daily With Breakfast.), Disp: 90 tablet, Rfl: 0    furosemide (LASIX) 20 MG tablet, Take 1 tablet by mouth Every Other Day., Disp: , Rfl:     furosemide (LASIX) 40 MG tablet, Take a half a tab 1 day and a whole tab the next day.  Can take an extra half on a as needed basis (Patient taking differently: Take 1 tablet by mouth Every Other Day. Take a half a tab 1 day and a whole tab the next day.  Can take an extra half on a as needed basis), Disp: 90 tablet, Rfl: 3    HYDROcodone-acetaminophen (NORCO) 5-325 MG per tablet, Take 1 tablet by mouth Every 6 (Six) Hours As Needed (AS NEEDED FOR PAIN)., Disp: 28 tablet, Rfl: 0    levothyroxine (Synthroid) 137 MCG tablet, TAKE 2 TABLETS IN THE      MORNING 30 MINUTES BEFORE  BREAKFAST (INCREASED DOSE), Disp: 180 tablet, Rfl: 1    liothyronine (CYTOMEL) 5 MCG tablet, TAKE 2 TABLETS DAILY (Patient taking differently: Take 2 tablets by mouth Daily.), Disp: 180 tablet, Rfl: 1    Magnesium 250 MG tablet, Take 1 tablet by mouth Daily., Disp: , Rfl:     meloxicam (Mobic) 15 MG tablet, Take 1 tablet by mouth Daily., Disp: 30 tablet, Rfl: 2    metoprolol succinate XL (TOPROL-XL) 25 MG 24 hr tablet, Take 1/2 tab po qd (Patient taking differently: Take 0.5 tablets by mouth Daily. Take 1/2 tab po qd), Disp: 45 tablet, Rfl: 3    multivitamin with minerals tablet tablet, Take 1 tablet  by mouth Daily., Disp: , Rfl:     Zinc 50 MG capsule, Take 50 mg by mouth Daily., Disp: , Rfl:          Objective     Vital Signs:   There were no vitals taken for this visit.             Assessment and Plan    Diagnoses and all orders for this visit:    1. Prostate cancer (Primary)        Discussed the PET scan today    We discussed his PET scan shows possibility of still localized prostate cancer with recurrence after XRT.    I did discuss option including salvage prostatectomy versus starting androgen deprivation therapy.    We discussed salvage prostatectomy with increased risk over primary prostatectomy.  We discussed about 20% risk of major complication and high risk of urinary incontinence postoperatively.    Risk and benefits discussed    Second opinion was offered at the [facility] for possibility of discussing localized treatments.  We discussed this at length.  At this time not interested in second opinion.      He is more interested in starting androgen deprivation therapy only think this is appropriate.  We discussed risk and benefits including hot flashes, decreased libido, ED, osteoporosis, fatigue.  Patient voiced understanding    Patient understands that ADT will not keep this in remission forever.  Somewhere between 3 to 10 years.  Patient voiced understanding      follow-up in few weeks with a PSA at that time we will discuss whether he wants to start on Lupron also based on his PSA shows    We will go ahead and preapproved for Lupron in case his PSA is higher.

## 2024-01-02 ENCOUNTER — OFFICE VISIT (OUTPATIENT)
Dept: UROLOGY | Facility: CLINIC | Age: 64
End: 2024-01-02
Payer: MEDICARE

## 2024-01-02 VITALS — RESPIRATION RATE: 19 BRPM

## 2024-01-02 DIAGNOSIS — M25.551 RIGHT HIP PAIN: ICD-10-CM

## 2024-01-02 DIAGNOSIS — C61 PROSTATE CANCER: Primary | ICD-10-CM

## 2024-01-02 DIAGNOSIS — Z47.89 AFTERCARE FOLLOWING SURGERY OF THE MUSCULOSKELETAL SYSTEM: Primary | ICD-10-CM

## 2024-01-02 PROCEDURE — 1160F RVW MEDS BY RX/DR IN RCRD: CPT | Performed by: UROLOGY

## 2024-01-02 PROCEDURE — 99213 OFFICE O/P EST LOW 20 MIN: CPT | Performed by: UROLOGY

## 2024-01-02 PROCEDURE — 1159F MED LIST DOCD IN RCRD: CPT | Performed by: UROLOGY

## 2024-01-12 ENCOUNTER — TREATMENT (OUTPATIENT)
Dept: PHYSICAL THERAPY | Facility: CLINIC | Age: 64
End: 2024-01-12
Payer: MEDICARE

## 2024-01-12 DIAGNOSIS — M62.81 MUSCLE WEAKNESS OF PROXIMAL EXTREMITY: ICD-10-CM

## 2024-01-12 DIAGNOSIS — Z98.890 S/P ORIF (OPEN REDUCTION INTERNAL FIXATION) FRACTURE: ICD-10-CM

## 2024-01-12 DIAGNOSIS — R26.89 BALANCE DISORDER: ICD-10-CM

## 2024-01-12 DIAGNOSIS — M25.551 ACUTE RIGHT HIP PAIN: Primary | ICD-10-CM

## 2024-01-12 DIAGNOSIS — Z87.81 S/P ORIF (OPEN REDUCTION INTERNAL FIXATION) FRACTURE: ICD-10-CM

## 2024-01-12 DIAGNOSIS — R26.9 GAIT DISTURBANCE: ICD-10-CM

## 2024-01-12 NOTE — PROGRESS NOTES
Physical Therapy Initial Evaluation and Plan of Care      [place] [facility]: [address] [place] [address]      Patient: Colton Chiang   : 1960  Diagnosis/ICD-10 Code:  Acute right hip pain [M25.551]  Referring practitioner: PRIMO Schmitz  Date of Initial Visit: 2024  Today's Date: 2024  Patient seen for 1 sessions           Subjective Questionnaire: LEFS:       Subjective   Pt reports to physical therapy post R hip intertrochanteric nailing w/ intermedullary screw on 10/30/2023. Pt reports they were walking out of [place] and fell on the floor. Pt reports they did have limp favoring their R LE prior to this incident. Pt reports their current pain is a 5-6/10. Pt reports it does not seem to get much better than this. Pt reports their pain does continue to get up to a 10/10 at night. Pt reports they are back to bed.     Numbness and tingling to B feet; even on both sides.       Pt occupation: ; farm, feed cattle     Aggravating Factors: walking, squatting, moving through increased ROM.    Easing Factors: tylenol, sometimes uses prescribed medication, ice, sitting    Past Medical Hx: cancer (prostate) continues to follow up will be receiving a hormone shot. Has had radiation in the past.   CVA years ago, CHF (follows up w/ provider, does take medication), Hard of hearing, HTN. SOB--smoking.       Objective          Active Range of Motion   Left Hip   Flexion: 110 degrees   External rotation (90/90): 20 degrees   Internal rotation (90/90): 25 degrees     Right Hip   Flexion: 95 degrees with pain  External rotation (90/90): 13 degrees with pain  Internal rotation (90/90): 15 degrees with pain    Additional Active Range of Motion Details  Hip flexion performed w/ heel slide     Strength/Myotome Testing     Left Hip   Planes of Motion   Flexion: 4  Abduction: 4  Adduction: 4    Right Hip   Planes of Motion   Flexion: 4-  Abduction: 4-  Adduction: 4    Left Knee    Flexion: 4  Extension: 4+    Right Knee   Flexion: 4  Extension: 4    Additional Strength Details  ABD/ADD seated today.  Unable to perform SLR on the R    Ambulation     Comments   Pt uses a straight cane. Pt has a significant limp favoring their R LE.       See Exercise, Manual, and Modality Logs for complete treatment.       Assessment & Plan       Assessment  Impairments: abnormal coordination, abnormal gait, abnormal muscle firing, abnormal or restricted ROM, activity intolerance, impaired balance, impaired physical strength, lacks appropriate home exercise program and pain with function   Assessment details: R hip intertrochanteric nailing w/ intermedullary screw on 10/30/2023. Pt presents w/ decreased ROM, decreased strength, decreased balance, and gait abnormalities. Pt has increased perceived deficits described by LEFS. Pt was encouraged to continue using their walker at this time for improved safety in gait. Pt was educated on their HEP to further improve upon their LE strength. Pt will benefit from skilled physical therapy, to address their current impairments and limitations, in order to improve upon their functional mobility and gait to return to ADLs and daily tasks safely and without restrictions.         Prognosis: good    Goals  Plan Goals: HIP PROBLEMS    1. The patient complains of R hip pain.   LTG 1: 12 weeks:  The patient will report a pain rating of 1/10 or better in order to improve  tolerance to activities of daily living and improve sleep quality.    STATUS:  New   STG 1a: 6 weeks:  The patient will report a pain rating of 4/10 or better.    STATUS:  New   TREATMENT:  Therapeutic exercises, manual therapy, aquatic therapy, home exercise   instruction, and modalities as needed for pain to include:  electrical stimulation, moist heat, ice,   ultrasound, and diathermy.    2. The patient demonstrates weakness of the R hip.   LTG 2: 12 weeks:  The patient will demonstrate 4+/5 strength for R  hip flexion, abduction,  and extension in order to improve hip stability.    STATUS:  New   STG 2a: 6 weeks:  The patient will demonstrate 4/5 strength for R hip flexion, abduction,  and extension.    STATUS:  New   TREATMENT: Therapeutic exercises, manual therapy, aquatic therapy, home exercise instruction,  and modalities as needed for pain to include:  electrical stimulation, moist heat, ice, and ultrasound    3. The patient has gait dysfunction.  LTG 3: 12 weeks:  The patient will ambulate without assistive device, independently, for community distances with minimal limp to the R lower extremity in order to improve mobility and allow patient to perform activities such as shopping and work tasks with greater ease.  STATUS:  New  STG 3a: The patient will be independent in HEP.  STATUS:  New  TREATMENT: Gait training, aquatic therapy, therapeutic exercise, and home exercise instruction.    4. Mobility: Walking/Moving Around Functional Limitation    LTG 4: 12 weeks:  The patient will demonstrate 25% limitation by achieving a score of 60/80 on the Lower Extremity Functional Scale.  STATUS:  New  STG 4a: 6 weeks:  The patient will demonstrate 50% limitation by achieving a score of 40/80 on the Lower Extremity Functional Scale.    STATUS:  New  TREATMENT:  Manual therapy, therapeutic exercise, home exercise instruction, and modalities as needed to include: moist heat, electrical stimulation, and ultrasound.           PLAN:  Therapy options: will receive skilled therapy services  Planned modality interventions: Cryotherapy, Heat, and TENS  Planned therapy interventions:balance/weight-bearing training, ADL retraining, soft tissue mobilization, strengthening, stretching, therapeutic activities, manual therapy, joint mobilization, home exercise program/patient education, gait training, functional ROM exercises, flexibility, body mechanics training, postural training, and neuromuscular re-education  Frequency: 3x per  week  Duration in weeks: 12  Treatment plan discussed with: patient      Visit Diagnoses:    ICD-10-CM ICD-9-CM   1. Acute right hip pain  M25.551 719.45   2. S/P ORIF (open reduction internal fixation) fracture  Z98.890 V45.89    Z87.81 V15.51   3. Gait disturbance  R26.9 781.2   4. Muscle weakness of proximal extremity  M62.81 728.87   5. Balance disorder  R26.89 781.99       History # of Personal Factors and/or Comorbidities: LOW (0)  Examination of Body System(s): # of elements: LOW (1-2)  Clinical Presentation: STABLE   Clinical Decision Making: LOW       Timed:         Manual Therapy:    0     mins  19661;     Therapeutic Exercise:    25     mins  38368;     Neuromuscular Leonel:    0    mins  93089;    Therapeutic Activity:     0     mins  80653;     Gait Trainin     mins  15556;     Ultrasound:     0     mins  26216;    Ionto                               0    mins   48172  Self Care                       0     mins   61548  Canalith Repos    0     mins 91397      Un-Timed:  Electrical Stimulation:    0     mins  12541 ( );  Dry Needling     0     mins self-pay  Traction     0     mins 70622  Low Eval     25     Mins  35166  Mod Eval     0     Mins  17913  High Eval                       0     Mins  88826  Re-Eval                           0    mins  34300    Timed Treatment:   25   mins   Total Treatment:     50   mins    PT SIGNATURE: Olaf Almazan PT, DPT    Electronically signed 2024    [place] License: PT - [id number]    Initial Certification  Certification Period: 2024 thru 4/10/2024  I certify that the therapy services are furnished while this patient is under my care.  The services outlined above are required by this patient, and will be reviewed every 90 days.     PHYSICIAN: Priscilla Sinha APRN  NPI: [id number]      DATE:     Please sign and return via fax to [phone]. Thank you, [facility].

## 2024-01-15 ENCOUNTER — TREATMENT (OUTPATIENT)
Dept: PHYSICAL THERAPY | Facility: CLINIC | Age: 64
End: 2024-01-15
Payer: MEDICARE

## 2024-01-15 DIAGNOSIS — M62.81 MUSCLE WEAKNESS OF PROXIMAL EXTREMITY: ICD-10-CM

## 2024-01-15 DIAGNOSIS — Z98.890 S/P ORIF (OPEN REDUCTION INTERNAL FIXATION) FRACTURE: ICD-10-CM

## 2024-01-15 DIAGNOSIS — R26.89 BALANCE DISORDER: ICD-10-CM

## 2024-01-15 DIAGNOSIS — Z87.81 S/P ORIF (OPEN REDUCTION INTERNAL FIXATION) FRACTURE: ICD-10-CM

## 2024-01-15 DIAGNOSIS — R26.9 GAIT DISTURBANCE: ICD-10-CM

## 2024-01-15 DIAGNOSIS — M25.551 ACUTE RIGHT HIP PAIN: Primary | ICD-10-CM

## 2024-01-15 NOTE — PROGRESS NOTES
Physical Therapy Daily Treatment Note      Patient: Colton Chiang   : 1960  Referring practitioner: PRIMO Schmitz  Date of Initial Visit: Type: THERAPY  Noted: 2024  Today's Date: 1/15/2024  Patient seen for 2 sessions           Subjective Questionnaire:       Subjective Evaluation    History of Present Illness    Subjective comment: Pt reported 5-6/10 R hip pain.  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  Pt reports he uses his STC to get around on farm at times.  Pt reports he has taken a few steps without AD.       Objective   See Exercise, Manual, and Modality Logs for complete treatment.       Assessment & Plan       Assessment  Assessment details: Pt ambulated into clinic with RW.  Pt was observed to be unsteady on RLE with transfers.  Pt attempted step ups on 6 inch step and reported he had to lean to much then performed step ups on 4 inch step requiring BUE support on counter.  Pt reported increased pain to 6-6.5/10 after session.          Visit Diagnoses:    ICD-10-CM ICD-9-CM   1. Acute right hip pain  M25.551 719.45   2. S/P ORIF (open reduction internal fixation) fracture  Z98.890 V45.89    Z87.81 V15.51   3. Gait disturbance  R26.9 781.2   4. Muscle weakness of proximal extremity  M62.81 728.87   5. Balance disorder  R26.89 781.99       Progress per Plan of Care and Progress strengthening /stabilization /functional activity           Timed:  Manual Therapy:         mins  95497;  Therapeutic Exercise:    30     mins  56108;     Neuromuscular Leonel:        mins  98654;    Therapeutic Activity:     8     mins  74962;     Gait Training:           mins  04133;     Ultrasound:          mins  67460;    Electrical Stimulation:         mins  26007 ( );  Aquatic Therapy          mins  66961    Untimed:  Electrical Stimulation:         mins  75738 ( );  Mechanical Traction:         mins  20979;     Timed Treatment:   38   mins   Total  Treatment:     38   mins    Electronically signed    Tracie Gagnon PTA  Physical Therapist Assistant    [place] license: [id number]

## 2024-01-17 ENCOUNTER — LAB (OUTPATIENT)
Dept: LAB | Facility: HOSPITAL | Age: 64
End: 2024-01-17
Payer: MEDICARE

## 2024-01-17 ENCOUNTER — TREATMENT (OUTPATIENT)
Dept: PHYSICAL THERAPY | Facility: CLINIC | Age: 64
End: 2024-01-17
Payer: MEDICARE

## 2024-01-17 DIAGNOSIS — R26.9 GAIT DISTURBANCE: ICD-10-CM

## 2024-01-17 DIAGNOSIS — M62.81 MUSCLE WEAKNESS OF PROXIMAL EXTREMITY: ICD-10-CM

## 2024-01-17 DIAGNOSIS — R26.89 BALANCE DISORDER: ICD-10-CM

## 2024-01-17 DIAGNOSIS — Z98.890 S/P ORIF (OPEN REDUCTION INTERNAL FIXATION) FRACTURE: ICD-10-CM

## 2024-01-17 DIAGNOSIS — M25.551 ACUTE RIGHT HIP PAIN: Primary | ICD-10-CM

## 2024-01-17 DIAGNOSIS — Z87.81 S/P ORIF (OPEN REDUCTION INTERNAL FIXATION) FRACTURE: ICD-10-CM

## 2024-01-17 DIAGNOSIS — C61 PROSTATE CANCER: ICD-10-CM

## 2024-01-17 LAB — PSA SERPL-MCNC: 2.89 NG/ML (ref 0–4)

## 2024-01-17 PROCEDURE — 84153 ASSAY OF PSA TOTAL: CPT

## 2024-01-17 PROCEDURE — 36415 COLL VENOUS BLD VENIPUNCTURE: CPT

## 2024-01-17 NOTE — PROGRESS NOTES
Outpatient Physical Therapy  [address], [place] [address]                            Physical Therapy Daily Treatment Note    Patient: Colton Chiang   : 1960  Diagnosis/ICD-10 Code:  Acute right hip pain [M25.551]  Referring practitioner: PRIMO Schmitz  Date of Initial Visit: Type: THERAPY  Noted: 2024  Today's Date: 2024  Patient seen for 3 sessions           Subjective   Colton Chiang reports: that he was doing 30 reps of all his exercises at home and his hip has been miserable. States that he does take pain medication but usually only have a pill with a Tylenol, it will decrease the pain but not take it away completely.     Pain: 7.5-8/10 pain, currently     Objective   Pt educated on HEP.    See Exercise, Manual, and Modality Logs for complete treatment.     Assessment/Plan  Colton still experiencing increased R hip pain, especially after increased reps of HEP. Pt had some increased discomfort throughout PT session. Pt would benefit from skilled PT to address Range of Motion  and Strength deficits, pain management and any concerns with ADLs.       Progress per Plan of Care         Timed:  Manual Therapy:         mins  75837;  Therapeutic Exercise:    15     mins  00225;     Neuromuscular Leonel:    10    mins  95870;    Therapeutic Activity:     15     mins  49929;     Gait Training:           mins  84501;        Untimed:  Electrical Stimulation:         mins  56336 ( );  Mechanical Traction:         mins  22664;       Timed Treatment:   40   mins   Total Treatment:     40   mins      Electronically signed:     Natividad Pearce PTA  Physical Therapist Assistant  [facility] License #: [id number]

## 2024-01-20 NOTE — PROGRESS NOTES
Chief Complaint    Urologic complaint    Subjective          Colton Chiang presents to [facility] UROLOGY  History of Present Illness         64-year-old  gentleman       Prostatic adenocarcinoma    status post XRT in 2008 for Roxanne 8   ED      Still recovering from his hip fracture.      Voiding without issue.  No straining.  Nocturia times every 2 hours.  No prostate meds.       on Lasix     Patient did do Lupron before when he did his original XRT      PVR    8/23    023    No cardiopulmonary history. He smokes 1.5 packs daily. Baby aspirin daily         Previous      History of CHF been well controlled for several years    Patient has had some lower extremity edema on the right, he has been ruled out for DVT      Has ED, it is worrisome.    Patient does have some hip pain.  He is getting hip MRI in the month      Patient has had no gross hematuria, dysuria or recurrent urinary tract infections. No history of kidney or bladder stone.     never had any urologic surgery.        10/22 CT abdomen/pelvis with - fractures of lateral right ninth 10th and 11th ribs.  Cholelithiasis        Prostate cancer history    1/24     2.8       12/27/2023 PSMA - focal area of uptake right prostatic base concerning for malignancy recurrence.  Cervical, supraclavicular and thoracic adenopathy mild radiotracer uptake.  Unchanged since 10/22.  Close follow-up recommended.  Mild increased uptake left humeral head.  Likely avascular necrosis based on noncontrast CT findings.  Mild uptake T10 vertebral body.  Indeterminate.  Attention to follow-up.  Extensive groundglass and pulmonary opacification throughout the bilateral lungs.  Similar to 10/22.  Pulmonary referral recommended.  12/23 discussed tertiary referral for possibility of salvage prostectomy.  Patient not interested, was not interested in second opinion.    10/23    3.17     6/23      2.2  5/22      1.8  12/21    1.9  9/20     1.2  2/17     0.51  8/16    0.38  2/16    0.46  7/15    0.35  1/15   0.41  7/14    0.24  1/12    0.30  7/10    0.5  7/09     0.7  12/08   0.2  2008 XRT prostate  10/07 prostate biopsyprostatic adenocarcinoma, 5+3    9/07 17.2          Past History:  Medical History: has a past medical history of Allergies, Arthritis, Broken bones, CHF (congestive heart failure) (1990), Chronic back pain, Deafness, bilateral, Depression, HBP (high blood pressure), Heart disease, High cholesterol, History of radiation therapy, Hypothyroid (02/10/2017), Prostate cancer (02/05/2016), 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   Surgical History: has a past surgical history that includes Colonoscopy; Prostate biopsy; and Hip Trochanteric Nailing (Right, 10/30/2023).   Family History: family history includes Breast cancer in his sister; Diabetes in an other family member; Heart disease in an other family member.   Social History: reports that he has been smoking cigarettes. He started smoking about 52 years ago. He has a 90.00 pack-year smoking history. He has never used smokeless tobacco. He reports that he does not drink alcohol and does not use drugs.  Allergies: Penicillin g and Penicillins       Current Outpatient Medications:     Ascorbic Acid (Vitamin C) 500 MG capsule, Take 1 capsule by mouth Daily., Disp: , Rfl:     aspirin 81 MG EC tablet, Take 1 tablet by mouth Daily., Disp: , Rfl:     cephalexin (KEFLEX) 500 MG capsule, Take 1 capsule by mouth Every 6 (Six) Hours. (Patient not taking: Reported on 11/30/2023), Disp: 40 capsule, Rfl: 0    Cholecalciferol (Vitamin D3) 50 MCG (2000 UT) capsule, TAKE 1 CAPSULE BY MOUTH EVERY DAY (Patient taking differently: Take 1 capsule by mouth Daily.), Disp: 90 capsule, Rfl: 0    Coenzyme Q10 (Co Q-10) 300 MG capsule, Take 1 capsule by mouth Every Other Day. (Patient taking differently: Take 1 capsule by mouth Daily.), Disp: 30 each, Rfl: 5    FeroSul 325 (65 Fe) MG tablet, TAKE 1 TABLET BY  MOUTH EVERY DAY (Patient taking differently: Take 1 tablet by mouth Daily With Breakfast.), Disp: 90 tablet, Rfl: 0    furosemide (LASIX) 20 MG tablet, Take 1 tablet by mouth Every Other Day., Disp: , Rfl:     furosemide (LASIX) 40 MG tablet, Take a half a tab 1 day and a whole tab the next day.  Can take an extra half on a as needed basis (Patient taking differently: Take 1 tablet by mouth Every Other Day. Take a half a tab 1 day and a whole tab the next day.  Can take an extra half on a as needed basis), Disp: 90 tablet, Rfl: 3    HYDROcodone-acetaminophen (NORCO) 5-325 MG per tablet, Take 1 tablet by mouth Every 6 (Six) Hours As Needed (AS NEEDED FOR PAIN)., Disp: 28 tablet, Rfl: 0    levothyroxine (Synthroid) 137 MCG tablet, TAKE 2 TABLETS IN THE      MORNING 30 MINUTES BEFORE  BREAKFAST (INCREASED DOSE), Disp: 180 tablet, Rfl: 1    liothyronine (CYTOMEL) 5 MCG tablet, TAKE 2 TABLETS DAILY (Patient taking differently: Take 2 tablets by mouth Daily.), Disp: 180 tablet, Rfl: 1    Magnesium 250 MG tablet, Take 1 tablet by mouth Daily., Disp: , Rfl:     meloxicam (Mobic) 15 MG tablet, Take 1 tablet by mouth Daily., Disp: 30 tablet, Rfl: 2    metoprolol succinate XL (TOPROL-XL) 25 MG 24 hr tablet, Take 1/2 tab po qd (Patient taking differently: Take 0.5 tablets by mouth Daily. Take 1/2 tab po qd), Disp: 45 tablet, Rfl: 3    multivitamin with minerals tablet tablet, Take 1 tablet by mouth Daily., Disp: , Rfl:     Zinc 50 MG capsule, Take 50 mg by mouth Daily., Disp: , Rfl:          Objective     Vital Signs:   There were no vitals taken for this visit.             Assessment and Plan    Diagnoses and all orders for this visit:    1. Prostate cancer (Primary)          I did discuss options again including possibility of salvage prostatectomy and second opinion.        We discussed salvage prostatectomy with increased risk over primary prostatectomy.  We discussed about 20% risk of major complication and high risk of  urinary incontinence postoperatively.    Risk and benefits discussed    Second opinion was offered at the [facility] for possibility of discussing localized treatments.  We discussed this at length.  At this time not interested in second opinion.      After discussing again.  Patient wants to start androgen deprivation therapy only think this is appropriate.  We again discussed risk and benefits including hot flashes, decreased libido, ED, osteoporosis, fatigue.  Patient voiced understanding    Patient does understands that ADT will not keep this in remission forever.  Somewhere between 3 to 10 years.  Patient voiced understanding      Follow-up in 4 months with PSA

## 2024-01-22 ENCOUNTER — TREATMENT (OUTPATIENT)
Dept: PHYSICAL THERAPY | Facility: CLINIC | Age: 64
End: 2024-01-22
Payer: MEDICARE

## 2024-01-22 DIAGNOSIS — M25.551 ACUTE RIGHT HIP PAIN: Primary | ICD-10-CM

## 2024-01-22 DIAGNOSIS — Z98.890 S/P ORIF (OPEN REDUCTION INTERNAL FIXATION) FRACTURE: ICD-10-CM

## 2024-01-22 DIAGNOSIS — R26.89 BALANCE DISORDER: ICD-10-CM

## 2024-01-22 DIAGNOSIS — M62.81 MUSCLE WEAKNESS OF PROXIMAL EXTREMITY: ICD-10-CM

## 2024-01-22 DIAGNOSIS — Z87.81 S/P ORIF (OPEN REDUCTION INTERNAL FIXATION) FRACTURE: ICD-10-CM

## 2024-01-22 PROCEDURE — 97530 THERAPEUTIC ACTIVITIES: CPT | Performed by: PHYSICAL THERAPIST

## 2024-01-22 PROCEDURE — 97110 THERAPEUTIC EXERCISES: CPT | Performed by: PHYSICAL THERAPIST

## 2024-01-22 NOTE — PROGRESS NOTES
Physical Therapy Daily Treatment Note      Patient: Colton Chiang   : 1960  Referring practitioner: PRIMO Schmitz  Date of Initial Visit: Type: THERAPY  Noted: 2024  Today's Date: 2024  Patient seen for 4 sessions           Subjective Questionnaire:       Subjective Evaluation    History of Present Illness    Subjective comment: Pt reports constant R hip pain 6-7/10.  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       Objective   See Exercise, Manual, and Modality Logs for complete treatment.       Assessment & Plan       Assessment  Assessment details: Pt reports he didn't feel steady enough to ambulate to clinic without RW today and states he hadn't used his walker since last PT session.  Pt exhibits weakness performing sit to stand and using forward trunk lean to stand and difficulty sitting slowly.  Pt requires UE support for step ups.  Pt exhibits LLE weakness and will benefit from continued PT.        Visit Diagnoses:    ICD-10-CM ICD-9-CM   1. Acute right hip pain  M25.551 719.45   2. S/P ORIF (open reduction internal fixation) fracture  Z98.890 V45.89    Z87.81 V15.51   3. Muscle weakness of proximal extremity  M62.81 728.87   4. Balance disorder  R26.89 781.99       Progress per Plan of Care and Progress strengthening /stabilization /functional activity           Timed:  Manual Therapy:    6     mins  23365;  Therapeutic Exercise:    16     mins  63909;     Neuromuscular Leonel:        mins  40162;    Therapeutic Activity:     8     mins  64619;     Gait Training:           mins  07748;     Ultrasound:          mins  96096;    Electrical Stimulation:         mins  69910 ( );  Aquatic Therapy          mins  00080    Untimed:  Electrical Stimulation:         mins  41774 ( );  Mechanical Traction:         mins  32840;      Timed Treatment:   30   mins   Total Treatment:     30   mins    Electronically signed    Tracie Gagnon PTA  Physical Therapist Assistant    [place] license: [id number]

## 2024-01-23 ENCOUNTER — OFFICE VISIT (OUTPATIENT)
Dept: UROLOGY | Facility: CLINIC | Age: 64
End: 2024-01-23
Payer: MEDICARE

## 2024-01-23 VITALS — HEIGHT: 70 IN | WEIGHT: 229 LBS | BODY MASS INDEX: 32.78 KG/M2 | RESPIRATION RATE: 17 BRPM

## 2024-01-23 DIAGNOSIS — C61 PROSTATE CANCER: Primary | ICD-10-CM

## 2024-01-23 PROCEDURE — 99213 OFFICE O/P EST LOW 20 MIN: CPT | Performed by: UROLOGY

## 2024-01-23 PROCEDURE — 1160F RVW MEDS BY RX/DR IN RCRD: CPT | Performed by: UROLOGY

## 2024-01-23 PROCEDURE — 1159F MED LIST DOCD IN RCRD: CPT | Performed by: UROLOGY

## 2024-01-24 ENCOUNTER — TREATMENT (OUTPATIENT)
Dept: PHYSICAL THERAPY | Facility: CLINIC | Age: 64
End: 2024-01-24
Payer: MEDICARE

## 2024-01-24 DIAGNOSIS — Z87.81 S/P ORIF (OPEN REDUCTION INTERNAL FIXATION) FRACTURE: ICD-10-CM

## 2024-01-24 DIAGNOSIS — M25.551 ACUTE RIGHT HIP PAIN: Primary | ICD-10-CM

## 2024-01-24 DIAGNOSIS — R26.89 BALANCE DISORDER: ICD-10-CM

## 2024-01-24 DIAGNOSIS — R26.9 GAIT DISTURBANCE: ICD-10-CM

## 2024-01-24 DIAGNOSIS — M62.81 MUSCLE WEAKNESS OF PROXIMAL EXTREMITY: ICD-10-CM

## 2024-01-24 DIAGNOSIS — Z98.890 S/P ORIF (OPEN REDUCTION INTERNAL FIXATION) FRACTURE: ICD-10-CM

## 2024-01-24 NOTE — PROGRESS NOTES
Physical Therapy Daily Treatment Note  [place] [facility]: [address] [place] [address]      Patient: Colton Chiang   : 1960  Diagnosis/ICD-10 Code:  Acute right hip pain [M25.551]  Referring practitioner: PRIMO Schmitz  Date of Initial Visit: Type: THERAPY  Noted: 2024  Today's Date: 2024  Patient seen for 5 sessions           Subjective   The patient reported they do continue to have R hip pain. This is anterior and lateral. Pt reports it is still point tender. Pt follows up primary care tomorrow for their R lower leg redness.     Objective   Pt does continue to have R lower leg redness and edema. Edema is pitting.   See Exercise, Manual, and Modality Logs for complete treatment.     Assessment/Plan  Pt tolerates therapy session well. Pt does continue to have increased fatigue at times throughout the therapy session. Patient will continue to benefit from skilled physical therapy to further address their deficits in strength and ROM in order to improve upon their functional mobility and gait to return to performing ADLs and daily tasks at their prior level of function.          Timed:  Manual Therapy:    0     mins  72860;  Therapeutic Exercise:    23     mins  16240;     Neuromuscular Leonel:   0    mins  20226;    Therapeutic Activity:     15     mins  40381;     Gait Trainin     mins  77901;     Aquatics                         0      mins  73817    Un-timed:  Mechanical Traction      0     mins  81685  Electrical Stimulation:    0     mins  11374 ( );      Timed Treatment:   38   mins   Total Treatment:     38   mins    Olaf Almazan PT, DPT    Electronically signed 2024    [place] License: [id number] - [id number]

## 2024-01-25 ENCOUNTER — OFFICE VISIT (OUTPATIENT)
Dept: FAMILY MEDICINE CLINIC | Facility: CLINIC | Age: 64
End: 2024-01-25
Payer: MEDICARE

## 2024-01-25 VITALS
BODY MASS INDEX: 33.13 KG/M2 | RESPIRATION RATE: 18 BRPM | HEART RATE: 65 BPM | WEIGHT: 231.4 LBS | TEMPERATURE: 98.7 F | DIASTOLIC BLOOD PRESSURE: 70 MMHG | HEIGHT: 70 IN | OXYGEN SATURATION: 96 % | SYSTOLIC BLOOD PRESSURE: 135 MMHG

## 2024-01-25 DIAGNOSIS — S75.992A: ICD-10-CM

## 2024-01-25 DIAGNOSIS — E66.09 CLASS 1 OBESITY DUE TO EXCESS CALORIES WITH SERIOUS COMORBIDITY AND BODY MASS INDEX (BMI) OF 33.0 TO 33.9 IN ADULT: ICD-10-CM

## 2024-01-25 DIAGNOSIS — M79.89 RIGHT LEG SWELLING: Primary | ICD-10-CM

## 2024-01-25 DIAGNOSIS — F17.219 CIGARETTE NICOTINE DEPENDENCE WITH NICOTINE-INDUCED DISORDER: Chronic | ICD-10-CM

## 2024-01-25 NOTE — PROGRESS NOTES
"Chief Complaint  Leg Swelling (Right leg )    Subjective        Colton Chiang presents to [facility]  History of Present Illness  The patient is here today for a follow-up for the management of his acute and chronic medical conditions.  He is  with one daughter.  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 He has a family history of breast cancer.     The patient is still continuing to smoke.     The patient is continue to take 2-5 mcg Cytomel and 2-125 mcg levothyroxine daily.  His most recent TSH on 8/9/22 was found to be 1.08.  He denies feeling any more fatigued than he usually does.  The patient works a lot of hours and stays tired a lot.     He does not check his blood pressure at home. He is followed by cardiology. He states his blood pressure is normally 90-100s/80s.     His right lower leg swelling, pain and redness is unchanged. He has been taking an extra 20 mg of lasix every other day to help with his swelling. He does not like compression stockings.      He fell and fractured his left hip. 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     The patient has no other complaints today and denies chest pain, shortness of breath, weakness, numbness, nausea, vomiting, diarrhea, dizziness or syncopal event.                                  Objective   Vital Signs:  /70 (BP Location: Left arm, Patient Position: Sitting, Cuff Size: Adult)   Pulse 65   Temp 98.7 °F (37.1 °C) (Tympanic)   Resp 18   Ht 177.8 cm (70\")   Wt 105 kg (231 lb 6.4 oz)   SpO2 96%   BMI 33.20 kg/m²   Estimated body mass index is 33.2 kg/m² as calculated from the following:    Height as of this encounter: 177.8 cm (70\").    Weight as of this encounter: 105 kg (231 lb 6.4 oz).   "             Physical Exam  Vitals reviewed.   Constitutional:       Appearance: He is well-developed. He is obese.   HENT:      Head: Normocephalic and atraumatic.      Right Ear: External ear normal.      Left Ear: External ear normal.      Mouth/Throat:      Pharynx: No oropharyngeal exudate.   Eyes:      Conjunctiva/sclera: Conjunctivae normal.      Pupils: Pupils are equal, round, and reactive to light.   Neck:      Vascular: No carotid bruit.   Cardiovascular:      Rate and Rhythm: Normal rate and regular rhythm.      Heart sounds: No murmur heard.     No friction rub. No gallop.   Pulmonary:      Effort: Pulmonary effort is normal.      Breath sounds: Normal breath sounds. No wheezing or rhonchi.   Abdominal:      General: There is no distension.   Skin:     General: Skin is warm and dry.   Neurological:      Mental Status: He is alert and oriented to person, place, and time.      Cranial Nerves: No cranial nerve deficit.      Motor: No weakness.   Psychiatric:         Mood and Affect: Mood and affect normal.         Behavior: Behavior normal.         Thought Content: Thought content normal.         Judgment: Judgment normal.        Result Review :      CMP          10/30/2023    04:02 10/31/2023    04:01 11/1/2023    04:05   CMP   Glucose 131  165  99    BUN 15  9  11    Creatinine 0.57  0.72  0.75    EGFR 108.8  101.4  100.1    Sodium 134  133  136    Potassium 4.1  4.5  4.2    Chloride 97  98  98    Calcium 8.5  8.7  8.5    Total Protein 6.4      Albumin 3.2      Globulin 3.2      Total Bilirubin 0.2      Alkaline Phosphatase 84      AST (SGOT) 22      ALT (SGPT) 25      Albumin/Globulin Ratio 1.0      BUN/Creatinine Ratio 26.3  12.5  14.7    Anion Gap 8.5  6.3  7.2      CBC          10/30/2023    04:02 10/31/2023    04:01 11/1/2023    04:05   CBC   WBC 9.32   8.58    RBC 4.17   4.20    Hemoglobin 11.3  11.0  11.5    Hematocrit 36.3  35.5  36.2    MCV 87.1   86.2    MCH 27.1   27.4    MCHC 31.1   31.8     RDW 13.7   13.5    Platelets 240   220      Lipid Panel          6/23/2023    08:06   Lipid Panel   Total Cholesterol 142    Triglycerides 39    HDL Cholesterol 42    VLDL Cholesterol 9    LDL Cholesterol  91    LDL/HDL Ratio 2.20      TSH          6/23/2023    08:06 10/29/2023    21:39   TSH   TSH 4.000  2.530                   Assessment and Plan     Diagnoses and all orders for this visit:    1. Right leg swelling (Primary)  Comments:  The patient was given order for an ultrasound as well as an ankle-brachial index of the arterial vasculature of the right and left lower extremity.  Orders:  -     Doppler Arterial Multi Level Lower Extremity - Bilateral CAR; Future  -     US Ankle / Brachial Indices Extremity Complete; Future    2. Class 1 obesity due to excess calories with serious comorbidity and body mass index (BMI) of 33.0 to 33.9 in adult  Assessment & Plan:  Patient's (Body mass index is 33.2 kg/m².) indicates that they are obese (BMI >30) with health conditions that include dyslipidemias . Weight is worsening. BMI  is above average; BMI management plan is completed. We discussed low calorie, low carb based diet program, portion control, and increasing exercise.       3. Cigarette nicotine dependence with nicotine-induced disorder  Assessment & Plan:  Tobacco use is unchanged.  Smoking cessation counseling was provided.  Tobacco use will be reassessed at the next regular appointment.    Orders:  -     Doppler Arterial Multi Level Lower Extremity - Bilateral CAR; Future  -     US Ankle / Brachial Indices Extremity Complete; Future    4. Other specified injury of unspecified blood vessel at hip and thigh level, left leg, initial encounter  -     Doppler Arterial Multi Level Lower Extremity - Bilateral CAR; Future  -     US Ankle / Brachial Indices Extremity Complete; Future             Follow Up     Return in about 6 weeks (around 3/7/2024).  Patient was given instructions and counseling regarding his  condition or for health maintenance advice. Please see specific information pulled into the AVS if appropriate.

## 2024-01-26 ENCOUNTER — PATIENT ROUNDING (BHMG ONLY) (OUTPATIENT)
Dept: FAMILY MEDICINE CLINIC | Facility: CLINIC | Age: 64
End: 2024-01-26
Payer: MEDICARE

## 2024-01-26 ENCOUNTER — OFFICE VISIT (OUTPATIENT)
Dept: ORTHOPEDIC SURGERY | Facility: CLINIC | Age: 64
End: 2024-01-26
Payer: MEDICARE

## 2024-01-26 ENCOUNTER — TREATMENT (OUTPATIENT)
Dept: PHYSICAL THERAPY | Facility: CLINIC | Age: 64
End: 2024-01-26
Payer: MEDICARE

## 2024-01-26 VITALS — SYSTOLIC BLOOD PRESSURE: 155 MMHG | OXYGEN SATURATION: 90 % | HEART RATE: 69 BPM | DIASTOLIC BLOOD PRESSURE: 78 MMHG

## 2024-01-26 DIAGNOSIS — Z47.89 AFTERCARE FOLLOWING SURGERY OF THE MUSCULOSKELETAL SYSTEM: Primary | ICD-10-CM

## 2024-01-26 DIAGNOSIS — Z98.890 S/P ORIF (OPEN REDUCTION INTERNAL FIXATION) FRACTURE: ICD-10-CM

## 2024-01-26 DIAGNOSIS — M25.551 ACUTE RIGHT HIP PAIN: Primary | ICD-10-CM

## 2024-01-26 DIAGNOSIS — R26.89 BALANCE DISORDER: ICD-10-CM

## 2024-01-26 DIAGNOSIS — M62.81 MUSCLE WEAKNESS OF PROXIMAL EXTREMITY: ICD-10-CM

## 2024-01-26 DIAGNOSIS — R26.9 GAIT DISTURBANCE: ICD-10-CM

## 2024-01-26 DIAGNOSIS — Z87.81 S/P ORIF (OPEN REDUCTION INTERNAL FIXATION) FRACTURE: ICD-10-CM

## 2024-01-26 PROCEDURE — 99024 POSTOP FOLLOW-UP VISIT: CPT

## 2024-01-26 PROCEDURE — 1160F RVW MEDS BY RX/DR IN RCRD: CPT

## 2024-01-26 PROCEDURE — 1159F MED LIST DOCD IN RCRD: CPT

## 2024-01-26 RX ORDER — HYDROCODONE BITARTRATE AND ACETAMINOPHEN 5; 325 MG/1; MG/1
1 TABLET ORAL EVERY 6 HOURS PRN
Qty: 28 TABLET | Refills: 0 | Status: SHIPPED | OUTPATIENT
Start: 2024-01-26

## 2024-01-26 NOTE — PROGRESS NOTES
Physical Therapy Daily Note  [address] [place] [address]    VISIT#: 6    Subjective   Colton Chiang reports 6/10 pain in the R hip today.       Objective     See Exercise, Manual, and Modality Logs for complete treatment.     Assessment/Plan    Continued to progress patient's strengthening exercises and patient tolerated well. Able to progress patient's step height with step ups today and patient able to perform with use of countertop. Pt was challenged with FWD resisted alternating steps with advancing R LE secondary to hip flexor weakness. Will continue to progress patient as able.       Progress per Plan of Care and Progress strengthening /stabilization /functional activity            Timed:                 Manual Therapy:    0     mins  82988;     Therapeutic Exercise:    18     mins  89296;     Neuromuscular Leonel:    0    mins  44855;    Therapeutic Activity:     12     mins  33704;     Gait Trainin     mins  12571;     Ultrasound:     0     mins  67074;    Ionto                               0    mins   17074  Self pay                         0     mins [id number]    Un-Timed:  Electrical Stimulation:    0     mins  18474 (MC )  Canalith Repos    0     mins 95176  Dry Needling     0     mins self-pay  Traction     0     mins 19249    Timed Treatment:   30   mins   Total Treatment:     30   mins    Ghulam Gates PT  Physical Therapist    PT SIGNATURE: Electronically signed by Ghulam Gates PT  [place] LICENSE: [id number]

## 2024-01-26 NOTE — PROGRESS NOTES
A My-Chart message has been sent to the patient for PATIENT ROUNDING with [facility].

## 2024-01-29 ENCOUNTER — TREATMENT (OUTPATIENT)
Dept: PHYSICAL THERAPY | Facility: CLINIC | Age: 64
End: 2024-01-29
Payer: MEDICARE

## 2024-01-29 DIAGNOSIS — M62.81 MUSCLE WEAKNESS OF PROXIMAL EXTREMITY: ICD-10-CM

## 2024-01-29 DIAGNOSIS — M25.551 ACUTE RIGHT HIP PAIN: Primary | ICD-10-CM

## 2024-01-29 DIAGNOSIS — R26.9 GAIT DISTURBANCE: ICD-10-CM

## 2024-01-29 DIAGNOSIS — R26.89 BALANCE DISORDER: ICD-10-CM

## 2024-01-29 DIAGNOSIS — Z98.890 S/P ORIF (OPEN REDUCTION INTERNAL FIXATION) FRACTURE: ICD-10-CM

## 2024-01-29 DIAGNOSIS — Z87.81 S/P ORIF (OPEN REDUCTION INTERNAL FIXATION) FRACTURE: ICD-10-CM

## 2024-01-29 NOTE — PROGRESS NOTES
Outpatient Physical Therapy  [address] [facility], [place] [address]                            Physical Therapy Daily Treatment Note    Patient: Colton Chiang   : 1960  Diagnosis/ICD-10 Code:  Acute right hip pain [M25.551]  Referring practitioner: PRIMO Schmitz  Date of Initial Visit: Type: THERAPY  Noted: 2024  Today's Date: 2024  Patient seen for 7 sessions           Subjective   Colton Chiang reports: that overall the hip is doing really well, states that he went and saw the Ortho PA on Friday and they said he was healing up nicely.  Reports that he doesn't really use his walker around the house, but he will hold on to furniture. He uses his cane as he goes down the stairs at his house.    Objective   General fatigue    See Exercise, Manual, and Modality Logs for complete treatment.     Assessment/Plan  Colton progressing as evident by decreased overall R hip pain, not using walker around the house. Pt tolerated exercises well, just general fatigue. Pt would benefit from skilled PT to address Range of Motion  and Strength deficits, pain management and any concerns with ADLs.       Progress per Plan of Care         Timed:  Manual Therapy:         mins  62799;  Therapeutic Exercise:    23     mins  89248;     Neuromuscular Leonel:    10    mins  14315;    Therapeutic Activity:     12     mins  84647;     Gait Training:           mins  18471;        Untimed:  Electrical Stimulation:         mins  92284 ( );  Mechanical Traction:         mins  50457;       Timed Treatment:   45   mins   Total Treatment:     45   mins      Electronically signed:     Natividad Pearce PTA  Physical Therapist Assistant  [facility] License #: [id number]

## 2024-01-30 NOTE — PATIENT INSTRUCTIONS
X-rays taken and reviewed with patient.  Fracture is stable and healing routinely.  Hardware is intact.  Advised patient to continue physical therapy.  Continue home exercises.  Continue use of a walker until DC'd by PT.    Follow-up in 6 weeks with repeat x-rays.  Call for questions, concerns or worsening symptoms.

## 2024-01-30 NOTE — PROGRESS NOTES
Chief Complaint  Follow-up of the Right Hip    Subjective      Colton Chiang presents to [facility] for follow-up of right hip trochanteric nailing with intramedullary hip screw with Dr. Wolfe on 10/30/2023.  Patient is attending physical therapy with [facility] and doing well.  He presents today with a walker.  Patient has been managing his pain with Tylenol and ibuprofen.    Objective   Allergies   Allergen Reactions    Penicillin G Hives     Reaction as a kid    Penicillins Irritability       Vital Signs:   /78   Pulse 69   SpO2 90%     Please follow-up with PCP regarding blood pressure.    Physical Exam    Constitutional: Awake, alert. Well nourished appearance.    Integumentary: Warm, dry, intact. No obvious rashes.    HENT: Atraumatic, normocephalic.   Respiratory: Non labored respirations .   Cardiovascular: Intact peripheral pulses.    Psychiatric: Normal mood and affect. A&O X3    Ortho Exam  Right hip: Incision is completely healed and well-approximated.  3/5 strength to hip flexion, 4/5 hip extension, abduction and adduction.  No pain elicited with internal and external rotation of the hip in the groin.  Pain noted on the lateral hip with internal and external rotation.  Neurovascular intact.  Sensation is intact.    Imaging Results (Most Recent)       Procedure Component Value Units Date/Time    XR Hip With or Without Pelvis 2 - 3 View Right [[id number]] Resulted: 01/30/24 0911     Updated: 01/30/24 0911    Narrative:      X-Ray Report:  Study: X-rays ordered, taken in the office, and reviewed today.   Site: Right hip xray  Indication: Right hip ORIF  View: AP/Lateral view(s)  Findings: Right hip ORIF is intact with stable alignment and routine   healing. No evidence of hardware malfunction.   Prior studies available for comparison: yes                       Assessment and Plan   Problem List Items Addressed This Visit    None  Visit Diagnoses       Aftercare following  right hip trochanteric nailing    -  Primary    Relevant Orders    XR Hip With or Without Pelvis 2 - 3 View Right (Completed)            Follow Up   Return in about 6 weeks (around 3/8/2024).    Patient is a smoker, please discuss smoking cessation options with your PCP.    Social History     Socioeconomic History    Marital status:    Tobacco Use    Smoking status: Every Day     Packs/day: 2.00     Years: 45.00     Additional pack years: 0.00     Total pack years: 90.00     Types: Cigarettes     Start date: 1/1/1972    Smokeless tobacco: Never   Vaping Use    Vaping Use: Never used   Substance and Sexual Activity    Alcohol use: Never    Drug use: Never    Sexual activity: Defer       Patient Instructions   X-rays taken and reviewed with patient.  Fracture is stable and healing routinely.  Hardware is intact.  Advised patient to continue physical therapy.  Continue home exercises.  Continue use of a walker until DC'd by PT.    Follow-up in 6 weeks with repeat x-rays.  Call for questions, concerns or worsening symptoms.  Patient was given instructions and counseling regarding his condition or for health maintenance advice. Please see specific information pulled into the AVS if appropriate.

## 2024-01-31 ENCOUNTER — TREATMENT (OUTPATIENT)
Dept: PHYSICAL THERAPY | Facility: CLINIC | Age: 64
End: 2024-01-31
Payer: MEDICARE

## 2024-01-31 DIAGNOSIS — M25.551 ACUTE RIGHT HIP PAIN: Primary | ICD-10-CM

## 2024-01-31 DIAGNOSIS — R26.89 BALANCE DISORDER: ICD-10-CM

## 2024-01-31 DIAGNOSIS — Z87.81 S/P ORIF (OPEN REDUCTION INTERNAL FIXATION) FRACTURE: ICD-10-CM

## 2024-01-31 DIAGNOSIS — R26.9 GAIT DISTURBANCE: ICD-10-CM

## 2024-01-31 DIAGNOSIS — M62.81 MUSCLE WEAKNESS OF PROXIMAL EXTREMITY: ICD-10-CM

## 2024-01-31 DIAGNOSIS — Z98.890 S/P ORIF (OPEN REDUCTION INTERNAL FIXATION) FRACTURE: ICD-10-CM

## 2024-01-31 NOTE — PROGRESS NOTES
Outpatient Physical Therapy  [address] [facility], [place] [address]                            Physical Therapy Daily Treatment Note    Patient: Colton Chiang   : 1960  Diagnosis/ICD-10 Code:  Acute right hip pain [M25.551]  Referring practitioner: PRIMO Schmitz  Date of Initial Visit: Type: THERAPY  Noted: 2024  Today's Date: 2024  Patient seen for 8 sessions           Subjective   Colton Chiang reports: that the hip is really bothering him today, states that usually about an hour after PT session he's really hurting.     Objective   Decreased stance time on RLE when ambulating with SPC    See Exercise, Manual, and Modality Logs for complete treatment.     Assessment/Plan  Colton still experiencing increased R hip pain, especially after PT session. Pt increased fatigue by end of PT session. Pt would benefit from skilled PT to address Range of Motion  and Strength deficits, pain management and any concerns with ADLs.       Progress per Plan of Care         Timed:  Manual Therapy:         mins  35040;  Therapeutic Exercise:    15     mins  96441;     Neuromuscular Leonel:    10    mins  39095;    Therapeutic Activity:     15     mins  87087;     Gait Training:           mins  94411;        Untimed:  Electrical Stimulation:         mins  41846 (MC );  Mechanical Traction:         mins  18325;       Timed Treatment:   40   mins   Total Treatment:     40   mins      Electronically signed:     Natividad Pearce PTA  Physical Therapist Assistant  [facility] License #: [id number]

## 2024-02-02 ENCOUNTER — TREATMENT (OUTPATIENT)
Dept: PHYSICAL THERAPY | Facility: CLINIC | Age: 64
End: 2024-02-02
Payer: MEDICARE

## 2024-02-02 DIAGNOSIS — Z98.890 S/P ORIF (OPEN REDUCTION INTERNAL FIXATION) FRACTURE: ICD-10-CM

## 2024-02-02 DIAGNOSIS — R26.89 BALANCE DISORDER: ICD-10-CM

## 2024-02-02 DIAGNOSIS — Z87.81 S/P ORIF (OPEN REDUCTION INTERNAL FIXATION) FRACTURE: ICD-10-CM

## 2024-02-02 DIAGNOSIS — M62.81 MUSCLE WEAKNESS OF PROXIMAL EXTREMITY: ICD-10-CM

## 2024-02-02 DIAGNOSIS — R26.9 GAIT DISTURBANCE: ICD-10-CM

## 2024-02-02 DIAGNOSIS — M25.551 ACUTE RIGHT HIP PAIN: Primary | ICD-10-CM

## 2024-02-02 PROCEDURE — 97530 THERAPEUTIC ACTIVITIES: CPT | Performed by: PHYSICAL THERAPIST

## 2024-02-02 PROCEDURE — 97112 NEUROMUSCULAR REEDUCATION: CPT | Performed by: PHYSICAL THERAPIST

## 2024-02-02 PROCEDURE — 97110 THERAPEUTIC EXERCISES: CPT | Performed by: PHYSICAL THERAPIST

## 2024-02-02 NOTE — PROGRESS NOTES
Outpatient Physical Therapy  [address] [facility], [place] [address]                            Physical Therapy Daily Treatment Note    Patient: Colton Chiang   : 1960  Diagnosis/ICD-10 Code:  Acute right hip pain [M25.551]  Referring practitioner: PRIMO Schmitz  Date of Initial Visit: Type: THERAPY  Noted: 2024  Today's Date: 2024  Patient seen for 9 sessions           Subjective   Colton Chiang reports: that he is definitely more sore this morning, states that they have done a lot of vicki this week. Feels like he's way worse by the end of the day and at night.     Pain: 6/10 pain, currently    Objective   General fatigue, strengthen exercises modified today.    See Exercise, Manual, and Modality Logs for complete treatment.     Assessment/Plan  Colton still experiencing increased R hip pain, especially at night. Pt tolerated exercises well, just general fatigue. Pt would benefit from skilled PT to address Range of Motion  and Strength deficits, pain management and any concerns with ADLs.       Progress per Plan of Care         Timed:  Manual Therapy:         mins  53201;  Therapeutic Exercise:    15     mins  98485;     Neuromuscular Leonel:    10    mins  11348;    Therapeutic Activity:     15     mins  98150;     Gait Training:           mins  75281;        Untimed:  Electrical Stimulation:         mins  06722 ( );  Mechanical Traction:         mins  17612;       Timed Treatment:   40   mins   Total Treatment:     40   mins      Electronically signed:     Natividad Pearce PTA  Physical Therapist Assistant  [facility] License #: [id number]

## 2024-02-04 PROBLEM — M79.89 RIGHT LEG SWELLING: Status: ACTIVE | Noted: 2024-02-04

## 2024-02-05 ENCOUNTER — TELEPHONE (OUTPATIENT)
Dept: ORTHOPEDIC SURGERY | Facility: CLINIC | Age: 64
End: 2024-02-05
Payer: MEDICARE

## 2024-02-05 NOTE — ASSESSMENT & PLAN NOTE
Patient's (Body mass index is 33.2 kg/m².) indicates that they are obese (BMI >30) with health conditions that include dyslipidemias . Weight is worsening. BMI  is above average; BMI management plan is completed. We discussed low calorie, low carb based diet program, portion control, and increasing exercise.

## 2024-02-06 ENCOUNTER — TREATMENT (OUTPATIENT)
Dept: PHYSICAL THERAPY | Facility: CLINIC | Age: 64
End: 2024-02-06
Payer: MEDICARE

## 2024-02-06 DIAGNOSIS — R26.9 GAIT DISTURBANCE: ICD-10-CM

## 2024-02-06 DIAGNOSIS — R26.89 BALANCE DISORDER: ICD-10-CM

## 2024-02-06 DIAGNOSIS — M62.81 MUSCLE WEAKNESS OF PROXIMAL EXTREMITY: ICD-10-CM

## 2024-02-06 DIAGNOSIS — M25.551 ACUTE RIGHT HIP PAIN: Primary | ICD-10-CM

## 2024-02-06 DIAGNOSIS — Z87.81 S/P ORIF (OPEN REDUCTION INTERNAL FIXATION) FRACTURE: ICD-10-CM

## 2024-02-06 DIAGNOSIS — Z98.890 S/P ORIF (OPEN REDUCTION INTERNAL FIXATION) FRACTURE: ICD-10-CM

## 2024-02-06 NOTE — PROGRESS NOTES
Progress Note   [facility]: [address] [place] [address]      Patient: Colton Chiang   : 1960  Diagnosis/ICD-10 Code:  Acute right hip pain [M25.551]  Referring practitioner: PRIMO Schmitz  Date of Initial Visit: Type: THERAPY  Noted: 2024  Today's Date: 2024  Patient seen for 10 sessions      Subjective:   Subjective Questionnaire: LEFS:   Clinical Progress: unchanged  Home Program Compliance: Yes  Treatment has included: balance/weight-bearing training, ADL retraining, soft tissue mobilization, strengthening, stretching, therapeutic activities, manual therapy, joint mobilization, home exercise program/patient education, gait training, functional ROM exercises, flexibility, body mechanics training, postural training, and neuromuscular re-education    Subjective   Pt reports they are still having some increased soreness in their R hip. Pt reports they do not feel as though they had much change at this point since the start of therapy. Pt continues to have swelling as well as redness in their R lower leg.       Objective   Active Range of Motion   Left Hip   Flexion: 110 degrees   External rotation (90/90): 25 degrees   Internal rotation (90/90): 27 degrees      Right Hip   Flexion: 95 degrees with pain  External rotation (90/90): 16 degrees with pain  Internal rotation (90/90): 15 degrees with pain       Strength/Myotome Testing      Left Hip   Planes of Motion   Flexion: 4  Abduction: 4+  Adduction: 4+     Right Hip   Planes of Motion   Flexion: 4-  Abduction: 4  Adduction: 3+     Left Knee   Flexion: 4-  Extension: 5     Right Knee   Flexion: 4  Extension: 4+      See Exercise, Manual, and Modality Logs for complete treatment.     Assessment/Plan  Impairments: abnormal coordination, abnormal gait, abnormal muscle firing, abnormal or restricted ROM, activity intolerance, impaired balance, impaired physical strength, lacks appropriate home exercise program and pain with  function     Pt reported to physical therapy s/p R hip intertrochanteric nailing w/ intermedullary screw on 10/30/2023. Pt continues to have complaints of swelling and pain. Pt does continue to R lower leg redness. Pt encouraged to continue monitoring this and to use elevation and compression for swelling. Pt has had slight improvement in their strength and ROM. Pt has been ambulating w/ a straight cane at this time, and has progressed from a front wheeled walker. Discussed w/ pt about continuing to perform hip strengthening exs as HEP, for further stability and to improve upon their gait. Pt's goals will be addressed at this time to reflect their progress in therapy. Patient will continue to benefit from skilled physical therapy to further address their deficits in strength and hip stability in order to improve upon their functional mobility and to return the pt to performing their ADLs and daily tasks without restrictions.           Goals  Plan Goals: HIP PROBLEMS     1. The patient complains of R hip pain.              LTG 1: 12 weeks:  The patient will report a pain rating of 1/10 or better in order to improve  tolerance to activities of daily living and improve sleep quality.                          STATUS:  progressing              STG 1a: 6 weeks:  The patient will report a pain rating of 4/10 or better.                          STATUS:  progressing              TREATMENT:  Therapeutic exercises, manual therapy, aquatic therapy, home exercise   instruction, and modalities as needed for pain to include:  electrical stimulation, moist heat, ice,   ultrasound, and diathermy.     2. The patient demonstrates weakness of the R hip.              LTG 2: 12 weeks:  The patient will demonstrate 4+/5 strength for R hip flexion, abduction,  and extension in order to improve hip stability.                          STATUS:  progressing              STG 2a: 6 weeks:  The patient will demonstrate 4/5 strength for R hip  flexion, abduction,  and extension.                          STATUS: progressing              TREATMENT: Therapeutic exercises, manual therapy, aquatic therapy, home exercise instruction,  and modalities as needed for pain to include:  electrical stimulation, moist heat, ice, and ultrasound     3. The patient has gait dysfunction.  LTG 3: 12 weeks:  The patient will ambulate without assistive device, independently, for community distances with minimal limp to the R lower extremity in order to improve mobility and allow patient to perform activities such as shopping and work tasks with greater ease.  STATUS:  progressing  STG 3a: The patient will be independent in HEP.  STATUS:  yes   TREATMENT: Gait training, aquatic therapy, therapeutic exercise, and home exercise instruction.     4. Mobility: Walking/Moving Around Functional Limitation                   LTG 4: 12 weeks:  The patient will demonstrate 25% limitation by achieving a score of 60/80 on the Lower Extremity Functional Scale.  STATUS:  progressing  STG 4a: 6 weeks:  The patient will demonstrate 50% limitation by achieving a score of 40/80 on the Lower Extremity Functional Scale.    STATUS:  progressing  TREATMENT:  Manual therapy, therapeutic exercise, home exercise instruction, and modalities as needed to include: moist heat, electrical stimulation, and ultrasound.         Progress toward previous goals: Partially Met      Recommendations: Continue as planned  Timeframe: 3 a week, for 3 months   Prognosis to achieve goals: fair    PT Signature: Olaf Almazan PT, DPT    Electronically signed 2024    [place] License: PT - [id number]       Timed:  Manual Therapy:    0     mins  85802;  Therapeutic Exercise:    34     mins  83299;     Neuromuscular Leonel:    0    mins  96650;    Therapeutic Activity:     0     mins  61273;     Gait Trainin     mins  47529;     Aquatics                         0      mins  29152    Un-timed:  Mechanical Traction      0      mins  59738  Dry Needling     0     mins self-pay  Electrical Stimulation:    0     mins  88816 ( );    Timed Treatment:   34   mins   Total Treatment:     34   mins

## 2024-02-08 ENCOUNTER — TREATMENT (OUTPATIENT)
Dept: PHYSICAL THERAPY | Facility: CLINIC | Age: 64
End: 2024-02-08
Payer: MEDICARE

## 2024-02-08 DIAGNOSIS — R26.9 GAIT DISTURBANCE: ICD-10-CM

## 2024-02-08 DIAGNOSIS — Z87.81 S/P ORIF (OPEN REDUCTION INTERNAL FIXATION) FRACTURE: ICD-10-CM

## 2024-02-08 DIAGNOSIS — Z98.890 S/P ORIF (OPEN REDUCTION INTERNAL FIXATION) FRACTURE: ICD-10-CM

## 2024-02-08 DIAGNOSIS — M25.551 ACUTE RIGHT HIP PAIN: Primary | ICD-10-CM

## 2024-02-08 DIAGNOSIS — M62.81 MUSCLE WEAKNESS OF PROXIMAL EXTREMITY: ICD-10-CM

## 2024-02-08 DIAGNOSIS — R26.89 BALANCE DISORDER: ICD-10-CM

## 2024-02-08 NOTE — PROGRESS NOTES
Physical Therapy Daily Treatment Note  [place] [facility]: [address] [place] [address]      Patient: Colton Chiang   : 1960  Diagnosis/ICD-10 Code:  Acute right hip pain [M25.551]  Referring practitioner: PRIMO Schmitz  Date of Initial Visit: Type: THERAPY  Noted: 2024  Today's Date: 2024  Patient seen for 11 sessions           Subjective   The patient reported they are a little sore today in their R hip from driving around in their truck yesterday. Pt reports their pain is a 6-7/10. Pt states the pain has not changed much, which has been discouraging.    Objective   See Exercise, Manual, and Modality Logs for complete treatment.     Assessment/Plan  Pt performs therapy session well today. Pt requires cues to improve reduce compensation w/ sit to stands as well as to perform sidestepping exs w/ improved glute med activation. Patient will continue to benefit from skilled physical therapy to further address their deficits in strength and hip stability in order to improve upon their functional mobility and to return to performing ADLs and daily tasks safely.          Timed:  Manual Therapy:    0     mins  04701;  Therapeutic Exercise:    20     mins  42115;     Neuromuscular Leonel:   10    mins  73185;    Therapeutic Activity:     10     mins  92304;     Gait Trainin     mins  92755;     Aquatics                         0      mins  62399    Un-timed:  Mechanical Traction      0     mins  44443  Electrical Stimulation:    0     mins  21248 ( );      Timed Treatment:   40   mins   Total Treatment:     40   mins    Olaf Almazan PT, DPT    Electronically signed 2024    [place] License: PT - [id number]

## 2024-02-09 ENCOUNTER — TREATMENT (OUTPATIENT)
Dept: PHYSICAL THERAPY | Facility: CLINIC | Age: 64
End: 2024-02-09
Payer: MEDICARE

## 2024-02-09 DIAGNOSIS — M25.551 ACUTE RIGHT HIP PAIN: Primary | ICD-10-CM

## 2024-02-09 DIAGNOSIS — M62.81 MUSCLE WEAKNESS OF PROXIMAL EXTREMITY: ICD-10-CM

## 2024-02-09 DIAGNOSIS — R26.89 BALANCE DISORDER: ICD-10-CM

## 2024-02-09 DIAGNOSIS — Z87.81 S/P ORIF (OPEN REDUCTION INTERNAL FIXATION) FRACTURE: ICD-10-CM

## 2024-02-09 DIAGNOSIS — R26.9 GAIT DISTURBANCE: ICD-10-CM

## 2024-02-09 DIAGNOSIS — Z98.890 S/P ORIF (OPEN REDUCTION INTERNAL FIXATION) FRACTURE: ICD-10-CM

## 2024-02-09 NOTE — PROGRESS NOTES
Outpatient Physical Therapy  [address] [facility], [place] [address]                            Physical Therapy Daily Treatment Note    Patient: Colton Chiang   : 1960  Diagnosis/ICD-10 Code:  Acute right hip pain [M25.551]  Referring practitioner: PRIMO Schmitz  Date of Initial Visit: Type: THERAPY  Noted: 2024  Today's Date: 2024  Patient seen for 12 sessions           Subjective   Colton Chiang reports: that he was extra sore after last PT session, states that he sat in the truck and drove to about 530 and could not get out of the truck afterwards.     Objective   General fatigue.    See Exercise, Manual, and Modality Logs for complete treatment.     Assessment/Plan  Colton progressing as evident by decreased overall R hip pain,sore and stiff after last PT session. Pt tolerated exercises well, just general fatigue. Pt would benefit from skilled PT to address Range of Motion  and Strength deficits, pain management and any concerns with ADLs.       Progress per Plan of Care         Timed:  Manual Therapy:         mins  01454;  Therapeutic Exercise:    22     mins  20958;     Neuromuscular Leonel:        mins  52511;    Therapeutic Activity:     23     mins  51601;     Gait Training:           mins  98905;        Untimed:  Electrical Stimulation:         mins  19346 ( );  Mechanical Traction:         mins  16235;       Timed Treatment:   45   mins   Total Treatment:     45   mins      Electronically signed:     Natividad Pearce PTA  Physical Therapist Assistant  [facility]s [facility]UMAR License #: [id number]

## 2024-02-12 ENCOUNTER — TREATMENT (OUTPATIENT)
Dept: PHYSICAL THERAPY | Facility: CLINIC | Age: 64
End: 2024-02-12
Payer: MEDICARE

## 2024-02-12 DIAGNOSIS — Z98.890 S/P ORIF (OPEN REDUCTION INTERNAL FIXATION) FRACTURE: ICD-10-CM

## 2024-02-12 DIAGNOSIS — R26.89 BALANCE DISORDER: ICD-10-CM

## 2024-02-12 DIAGNOSIS — Z87.81 S/P ORIF (OPEN REDUCTION INTERNAL FIXATION) FRACTURE: ICD-10-CM

## 2024-02-12 DIAGNOSIS — M25.551 ACUTE RIGHT HIP PAIN: Primary | ICD-10-CM

## 2024-02-12 DIAGNOSIS — R26.9 GAIT DISTURBANCE: ICD-10-CM

## 2024-02-12 DIAGNOSIS — M62.81 MUSCLE WEAKNESS OF PROXIMAL EXTREMITY: ICD-10-CM

## 2024-02-12 PROCEDURE — 97110 THERAPEUTIC EXERCISES: CPT

## 2024-02-12 PROCEDURE — 97530 THERAPEUTIC ACTIVITIES: CPT

## 2024-02-14 ENCOUNTER — TREATMENT (OUTPATIENT)
Dept: PHYSICAL THERAPY | Facility: CLINIC | Age: 64
End: 2024-02-14
Payer: MEDICARE

## 2024-02-14 DIAGNOSIS — M25.551 ACUTE RIGHT HIP PAIN: Primary | ICD-10-CM

## 2024-02-14 DIAGNOSIS — R26.89 BALANCE DISORDER: ICD-10-CM

## 2024-02-14 DIAGNOSIS — M62.81 MUSCLE WEAKNESS OF PROXIMAL EXTREMITY: ICD-10-CM

## 2024-02-14 DIAGNOSIS — Z98.890 S/P ORIF (OPEN REDUCTION INTERNAL FIXATION) FRACTURE: ICD-10-CM

## 2024-02-14 DIAGNOSIS — R26.9 GAIT DISTURBANCE: ICD-10-CM

## 2024-02-14 DIAGNOSIS — Z87.81 S/P ORIF (OPEN REDUCTION INTERNAL FIXATION) FRACTURE: ICD-10-CM

## 2024-02-15 ENCOUNTER — LAB (OUTPATIENT)
Dept: LAB | Facility: HOSPITAL | Age: 64
End: 2024-02-15
Payer: MEDICARE

## 2024-02-15 DIAGNOSIS — E78.2 HYPERLIPEMIA, MIXED: ICD-10-CM

## 2024-02-15 DIAGNOSIS — I50.32 DIASTOLIC CHF, CHRONIC: ICD-10-CM

## 2024-02-15 LAB
ALBUMIN SERPL-MCNC: 4 G/DL (ref 3.5–5.2)
ALP SERPL-CCNC: 93 U/L (ref 39–117)
ALT SERPL W P-5'-P-CCNC: 34 U/L (ref 1–41)
ANION GAP SERPL CALCULATED.3IONS-SCNC: 8.7 MMOL/L (ref 5–15)
AST SERPL-CCNC: 33 U/L (ref 1–40)
BILIRUB CONJ SERPL-MCNC: <0.2 MG/DL (ref 0–0.3)
BILIRUB INDIRECT SERPL-MCNC: NORMAL MG/DL
BILIRUB SERPL-MCNC: 0.2 MG/DL (ref 0–1.2)
BUN SERPL-MCNC: 16 MG/DL (ref 8–23)
BUN/CREAT SERPL: 20.3 (ref 7–25)
CALCIUM SPEC-SCNC: 9.1 MG/DL (ref 8.6–10.5)
CHLORIDE SERPL-SCNC: 100 MMOL/L (ref 98–107)
CHOLEST SERPL-MCNC: 163 MG/DL (ref 0–200)
CO2 SERPL-SCNC: 29.3 MMOL/L (ref 22–29)
CREAT SERPL-MCNC: 0.79 MG/DL (ref 0.76–1.27)
EGFRCR SERPLBLD CKD-EPI 2021: 98 ML/MIN/1.73
GLUCOSE SERPL-MCNC: 91 MG/DL (ref 65–99)
HDLC SERPL-MCNC: 38 MG/DL (ref 40–60)
LDLC SERPL CALC-MCNC: 114 MG/DL (ref 0–100)
LDLC/HDLC SERPL: 2.99 {RATIO}
POTASSIUM SERPL-SCNC: 4.7 MMOL/L (ref 3.5–5.2)
PROT SERPL-MCNC: 7 G/DL (ref 6–8.5)
SODIUM SERPL-SCNC: 138 MMOL/L (ref 136–145)
TRIGL SERPL-MCNC: 57 MG/DL (ref 0–150)
VLDLC SERPL-MCNC: 11 MG/DL (ref 5–40)

## 2024-02-15 PROCEDURE — 36415 COLL VENOUS BLD VENIPUNCTURE: CPT

## 2024-02-15 PROCEDURE — 80048 BASIC METABOLIC PNL TOTAL CA: CPT

## 2024-02-15 PROCEDURE — 80061 LIPID PANEL: CPT

## 2024-02-15 PROCEDURE — 80076 HEPATIC FUNCTION PANEL: CPT

## 2024-02-16 NOTE — PROGRESS NOTES
[facility]  Cardiology progress Note    Patient Name: Colton Chiang  : 1960    CHIEF COMPLAINT  CHF        Subjective   Subjective     HISTORY OF PRESENT ILLNESS    Colton Chiang is a 64 y.o. male with history of CHF.  No chest pain.  No shortness of breath.    REVIEW OF SYSTEMS    Constitutional:    No fever, no weight loss  Skin:     No rash  Otolaryngeal:    No difficulty swallowing  Cardiovascular: See HPI.  Pulmonary:    No cough, no sputum production    Personal History     Social History:    reports that he has been smoking cigarettes. He started smoking about 52 years ago. He has a 90.00 pack-year smoking history. He has never used smokeless tobacco. He reports that he does not drink alcohol and does not use drugs.    Home Medications:  Current Outpatient Medications on File Prior to Visit   Medication Sig    Ascorbic Acid (Vitamin C) 500 MG capsule Take 1 capsule by mouth Daily.    aspirin 81 MG EC tablet Take 1 tablet by mouth Daily.    Coenzyme Q10 (Co Q-10) 300 MG capsule Take 1 capsule by mouth Every Other Day. (Patient taking differently: Take 1 capsule by mouth Daily.)    HYDROcodone-acetaminophen (NORCO) 5-325 MG per tablet Take 1 tablet by mouth Every 6 (Six) Hours As Needed (as needed for pain).    levothyroxine (Synthroid) 137 MCG tablet TAKE 2 TABLETS IN THE      MORNING 30 MINUTES BEFORE  BREAKFAST (INCREASED DOSE)    liothyronine (CYTOMEL) 5 MCG tablet TAKE 2 TABLETS DAILY (Patient taking differently: Take 2 tablets by mouth Daily.)    Magnesium 250 MG tablet Take 1 tablet by mouth Daily.    meloxicam (Mobic) 15 MG tablet Take 1 tablet by mouth Daily.    metoprolol succinate XL (TOPROL-XL) 25 MG 24 hr tablet Take 1/2 tab po qd (Patient taking differently: Take 0.5 tablets by mouth Daily. Take 1/2 tab po qd)    multivitamin with minerals tablet tablet Take 1 tablet by mouth Daily.    Zinc 50 MG capsule Take 50 mg by mouth Daily.    [DISCONTINUED] Cholecalciferol (Vitamin D3)  50 MCG (2000 UT) capsule TAKE 1 CAPSULE BY MOUTH EVERY DAY (Patient taking differently: Take 1 capsule by mouth Daily.)    [DISCONTINUED] furosemide (LASIX) 20 MG tablet Take 1 tablet by mouth Every Other Day.    [DISCONTINUED] furosemide (LASIX) 40 MG tablet Take a half a tab 1 day and a whole tab the next day.  Can take an extra half on a as needed basis (Patient taking differently: Take 1 tablet by mouth Every Other Day. Take a half a tab 1 day and a whole tab the next day.  Can take an extra half on a as needed basis)     No current facility-administered medications on file prior to visit.       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     PHYSICAL EXAM:    General Appearance:   well developed  well nourished  HENT:   oropharynx moist  lips not cyanotic  Neck:  thyroid not enlarged  supple  Respiratory:  no respiratory distress  normal breath sounds  no rales  Cardiovascular:  no jugular venous distention  regular rhythm  apical impulse normal  S1 normal, S2 normal  no S3, no S4   no murmur  no rub, no thrill  carotid pulses normal; no bruit  pedal pulses normal  lower extremity edema: Edema right leg plus  Skin:   warm, dry  Psychiatric:  judgement and insight appropriate  normal mood and affect        Result Review:  I have personally reviewed the available results from  [x]  Laboratory  [x]  EKG  [x]   Cardiology  [x]  Medications  [x]  Old records  []  Other:     Procedures  Lab Results   Component Value Date    CHOL 163 02/15/2024    CHOL 142 06/23/2023    CHOL 141 08/09/2022     Lab Results   Component Value Date    TRIG 57 02/15/2024    TRIG 39 06/23/2023    TRIG 70 08/09/2022     Lab Results   Component Value Date    HDL 38 (L) 02/15/2024    HDL 42 06/23/2023    HDL 36 (L) 08/09/2022     Lab Results   Component Value Date     (H) 02/15/2024    LDL 91 06/23/2023    LDL 91 08/09/2022     Lab Results   Component Value Date    VLDL 11 02/15/2024    VLDL 9 06/23/2023    VLDL 14 08/09/2022     Results for orders placed in visit on 12/29/22    Adult Transthoracic Echo Complete W/ Cont if Necessary Per Protocol    Interpretation Summary  Normal left ventricular systolic function.  No significant valve abnormalities noted.     Impression/Plan:  1.  Mixed hyperlipidemia: Low-fat diet advised. monitor lipid and hepatic profile.  2.  Chronic diastolic heart failure stable: Continue Toprol-XL 12.5 mg once a day.  Continue Lasix 40 mg once a day.  Noncompliant with diet and fluid intake.  Low-salt diet and fluid restriction advised.           Colin Concepcion MD   02/20/24   09:28 EST

## 2024-02-19 ENCOUNTER — TREATMENT (OUTPATIENT)
Dept: PHYSICAL THERAPY | Facility: CLINIC | Age: 64
End: 2024-02-19
Payer: MEDICARE

## 2024-02-19 DIAGNOSIS — Z98.890 S/P ORIF (OPEN REDUCTION INTERNAL FIXATION) FRACTURE: ICD-10-CM

## 2024-02-19 DIAGNOSIS — M25.551 ACUTE RIGHT HIP PAIN: Primary | ICD-10-CM

## 2024-02-19 DIAGNOSIS — M62.81 MUSCLE WEAKNESS OF PROXIMAL EXTREMITY: ICD-10-CM

## 2024-02-19 DIAGNOSIS — R26.89 BALANCE DISORDER: ICD-10-CM

## 2024-02-19 DIAGNOSIS — Z87.81 S/P ORIF (OPEN REDUCTION INTERNAL FIXATION) FRACTURE: ICD-10-CM

## 2024-02-19 DIAGNOSIS — R26.9 GAIT DISTURBANCE: ICD-10-CM

## 2024-02-19 PROCEDURE — 97110 THERAPEUTIC EXERCISES: CPT

## 2024-02-19 PROCEDURE — 97530 THERAPEUTIC ACTIVITIES: CPT

## 2024-02-19 PROCEDURE — 97112 NEUROMUSCULAR REEDUCATION: CPT

## 2024-02-19 NOTE — PROGRESS NOTES
Outpatient Physical Therapy  [address] [facility] [place], [place] [address]                            Physical Therapy Daily Treatment Note    Patient: Colton Chiang   : 1960  Diagnosis/ICD-10 Code:  Acute right hip pain [M25.551]  Referring practitioner: PRIMO Schmitz  Date of Initial Visit: Type: THERAPY  Noted: 2024  Today's Date: 2024  Patient seen for 15 sessions           Subjective   Colton Chiang reports: that he did a bunch of his exercises this weekend, he did both strengthen and got up and walked the loop around his house multiple times. States that he is a little sore today.     Objective   Decreased stance time on RLE due to pain.    See Exercise, Manual, and Modality Logs for complete treatment.     Assessment/Plan  Colton still experiencing increased R hip pain. Pt had some increased discomfort throughout PT session. Pt would benefit from skilled PT to address Range of Motion  and Strength deficits, pain management and any concerns with ADLs. .    Progress per Plan of Care         Timed:  Manual Therapy:        mins  54747;  Therapeutic Exercise:    15     mins  61447;     Neuromuscular Leonel:    10    mins  14723;    Therapeutic Activity:     15     mins  79308;     Gait Training:           mins  35627;          Timed Treatment:   40   mins   Total Treatment:     40   mins      Electronically signed:   Natividad Pearce PTA  Physical Therapist Assistant  [facility] License #: [id number]

## 2024-02-20 ENCOUNTER — OFFICE VISIT (OUTPATIENT)
Dept: CARDIOLOGY | Facility: CLINIC | Age: 64
End: 2024-02-20
Payer: MEDICARE

## 2024-02-20 VITALS
WEIGHT: 227 LBS | DIASTOLIC BLOOD PRESSURE: 76 MMHG | SYSTOLIC BLOOD PRESSURE: 150 MMHG | HEIGHT: 70 IN | HEART RATE: 70 BPM | BODY MASS INDEX: 32.5 KG/M2

## 2024-02-20 DIAGNOSIS — E78.2 HYPERLIPEMIA, MIXED: ICD-10-CM

## 2024-02-20 DIAGNOSIS — I50.32 DIASTOLIC CHF, CHRONIC: Primary | ICD-10-CM

## 2024-02-20 PROCEDURE — 99213 OFFICE O/P EST LOW 20 MIN: CPT | Performed by: SPECIALIST

## 2024-02-20 RX ORDER — FUROSEMIDE 40 MG/1
20 TABLET ORAL DAILY
Qty: 90 TABLET | Refills: 6 | Status: SHIPPED | OUTPATIENT
Start: 2024-02-20

## 2024-02-20 NOTE — PATIENT INSTRUCTIONS
"Low-Sodium Eating Plan  Sodium, which is an element that makes up salt, helps you maintain a healthy balance of fluids in your body. Too much sodium can increase your blood pressure and cause fluid and waste to be held in your body.  Your health care provider or dietitian may recommend following this plan if you have high blood pressure (hypertension), kidney disease, liver disease, or heart failure. Eating less sodium can help lower your blood pressure, reduce swelling, and protect your heart, liver, and kidneys.  What are tips for following this plan?  Reading food labels  The Nutrition Facts label lists the amount of sodium in one serving of the food. If you eat more than one serving, you must multiply the listed amount of sodium by the number of servings.  Choose foods with less than 140 mg of sodium per serving.  Avoid foods with 300 mg of sodium or more per serving.  Shopping    Look for lower-sodium products, often labeled as \"low-sodium\" or \"no salt added.\"  Always check the sodium content, even if foods are labeled as \"unsalted\" or \"no salt added.\"  Buy fresh foods.  Avoid canned foods and pre-made or frozen meals.  Avoid canned, cured, or processed meats.  Buy breads that have less than 80 mg of sodium per slice.  Cooking    Eat more home-cooked food and less restaurant, buffet, and fast food.  Avoid adding salt when cooking. Use salt-free seasonings or herbs instead of table salt or sea salt. Check with your health care provider or pharmacist before using salt substitutes.  Cook with plant-based oils, such as canola, sunflower, or olive oil.  Meal planning  When eating at a restaurant, ask that your food be prepared with less salt or no salt, if possible. Avoid dishes labeled as brined, pickled, cured, smoked, or made with soy sauce, miso, or teriyaki sauce.  Avoid foods that contain MSG (monosodium glutamate). MSG is sometimes added to Chinese food, bouillon, and some canned foods.  Make meals that can " "be grilled, baked, poached, roasted, or steamed. These are generally made with less sodium.  General information  Most people on this plan should limit their sodium intake to 1,500-2,000 mg (milligrams) of sodium each day.  What foods should I eat?  Fruits  Fresh, frozen, or canned fruit. Fruit juice.  Vegetables  Fresh or frozen vegetables. \"No salt added\" canned vegetables. \"No salt added\" tomato sauce and paste. Low-sodium or reduced-sodium tomato and vegetable juice.  Grains  Low-sodium cereals, including oats, puffed wheat and rice, and shredded wheat. Low-sodium crackers. Unsalted rice. Unsalted pasta. Low-sodium bread. Whole-grain breads and whole-grain pasta.  Meats and other proteins  Fresh or frozen (no salt added) meat, poultry, seafood, and fish. Low-sodium canned tuna and salmon. Unsalted nuts. Dried peas, beans, and lentils without added salt. Unsalted canned beans. Eggs. Unsalted nut butters.  Dairy  Milk. Soy milk. Cheese that is naturally low in sodium, such as ricotta cheese, fresh mozzarella, or Swiss cheese. Low-sodium or reduced-sodium cheese. Cream cheese. Yogurt.  Seasonings and condiments  Fresh and dried herbs and spices. Salt-free seasonings. Low-sodium mustard and ketchup. Sodium-free salad dressing. Sodium-free light mayonnaise. Fresh or refrigerated horseradish. Lemon juice. Vinegar.  Other foods  Homemade, reduced-sodium, or low-sodium soups. Unsalted popcorn and pretzels. Low-salt or salt-free chips.  The items listed above may not be a complete list of foods and beverages you can eat. Contact a dietitian for more information.  What foods should I avoid?  Vegetables  Sauerkraut, pickled vegetables, and relishes. Olives. French fries. Onion rings. Regular canned vegetables (not low-sodium or reduced-sodium). Regular canned tomato sauce and paste (not low-sodium or reduced-sodium). Regular tomato and vegetable juice (not low-sodium or reduced-sodium). Frozen vegetables in " sauces.  Grains  Instant hot cereals. Bread stuffing, pancake, and biscuit mixes. Croutons. Seasoned rice or pasta mixes. Noodle soup cups. Boxed or frozen macaroni and cheese. Regular salted crackers. Self-rising flour.  Meats and other proteins  Meat or fish that is salted, canned, smoked, spiced, or pickled. Precooked or cured meat, such as sausages or meat loaves. Partida. Ham. Pepperoni. Hot dogs. Corned beef. Chipped beef. Salt pork. Jerky. Pickled herring. Anchovies and sardines. Regular canned tuna. Salted nuts.  Dairy  Processed cheese and cheese spreads. Hard cheeses. Cheese curds. Blue cheese. Feta cheese. String cheese. Regular cottage cheese. Buttermilk. Canned milk.  Fats and oils  Salted butter. Regular margarine. Ghee. Partida fat.  Seasonings and condiments  Onion salt, garlic salt, seasoned salt, table salt, and sea salt. Canned and packaged gravies. Worcestershire sauce. Tartar sauce. Barbecue sauce. Teriyaki sauce. Soy sauce, including reduced-sodium. Steak sauce. Fish sauce. Oyster sauce. Cocktail sauce. Horseradish that you find on the shelf. Regular ketchup and mustard. Meat flavorings and tenderizers. Bouillon cubes. Hot sauce. Pre-made or packaged marinades. Pre-made or packaged taco seasonings. Relishes. Regular salad dressings. Salsa.  Other foods  Salted popcorn and pretzels. Corn chips and puffs. Potato and tortilla chips. Canned or dried soups. Pizza. Frozen entrees and pot pies.  The items listed above may not be a complete list of foods and beverages you should avoid. Contact a dietitian for more information.  Summary  Eating less sodium can help lower your blood pressure, reduce swelling, and protect your heart, liver, and kidneys.  Most people on this plan should limit their sodium intake to 1,500-2,000 mg (milligrams) of sodium each day.  Canned, boxed, and frozen foods are high in sodium. Restaurant foods, fast foods, and pizza are also very high in sodium. You also get sodium by  adding salt to food.  Try to cook at home, eat more fresh fruits and vegetables, and eat less fast food and canned, processed, or prepared foods.  This information is not intended to replace advice given to you by your health care provider. Make sure you discuss any questions you have with your health care provider.  Document Revised: 11/23/2020 Document Reviewed: 11/18/2020  [employer] Patient Education © 2023 [employer]

## 2024-02-22 ENCOUNTER — TREATMENT (OUTPATIENT)
Dept: PHYSICAL THERAPY | Facility: CLINIC | Age: 64
End: 2024-02-22
Payer: MEDICARE

## 2024-02-22 ENCOUNTER — HOSPITAL ENCOUNTER (OUTPATIENT)
Dept: CARDIOLOGY | Facility: HOSPITAL | Age: 64
Discharge: HOME | End: 2024-02-22
Payer: MEDICARE

## 2024-02-22 ENCOUNTER — OFFICE VISIT (OUTPATIENT)
Dept: ORTHOPEDIC SURGERY | Facility: CLINIC | Age: 64
End: 2024-02-22
Payer: MEDICARE

## 2024-02-22 ENCOUNTER — ANCILLARY ORDERS (OUTPATIENT)
Dept: FAMILY MEDICINE CLINIC | Facility: CLINIC | Age: 64
End: 2024-02-22
Payer: MEDICARE

## 2024-02-22 VITALS
DIASTOLIC BLOOD PRESSURE: 73 MMHG | WEIGHT: 227 LBS | HEART RATE: 74 BPM | SYSTOLIC BLOOD PRESSURE: 118 MMHG | OXYGEN SATURATION: 93 % | HEIGHT: 70 IN | BODY MASS INDEX: 32.5 KG/M2

## 2024-02-22 DIAGNOSIS — F17.219 CIGARETTE NICOTINE DEPENDENCE WITH NICOTINE-INDUCED DISORDER: Chronic | ICD-10-CM

## 2024-02-22 DIAGNOSIS — S75.992A: ICD-10-CM

## 2024-02-22 DIAGNOSIS — Z98.890 S/P ORIF (OPEN REDUCTION INTERNAL FIXATION) FRACTURE: ICD-10-CM

## 2024-02-22 DIAGNOSIS — M62.81 MUSCLE WEAKNESS OF PROXIMAL EXTREMITY: ICD-10-CM

## 2024-02-22 DIAGNOSIS — Z87.81 S/P ORIF (OPEN REDUCTION INTERNAL FIXATION) FRACTURE: ICD-10-CM

## 2024-02-22 DIAGNOSIS — M79.89 RIGHT LEG SWELLING: ICD-10-CM

## 2024-02-22 DIAGNOSIS — R26.89 BALANCE DISORDER: ICD-10-CM

## 2024-02-22 DIAGNOSIS — M25.551 ACUTE RIGHT HIP PAIN: Primary | ICD-10-CM

## 2024-02-22 DIAGNOSIS — S72.141A CLOSED INTERTROCHANTERIC FRACTURE OF HIP, RIGHT, INITIAL ENCOUNTER: Primary | ICD-10-CM

## 2024-02-22 DIAGNOSIS — E66.09 CLASS 1 OBESITY DUE TO EXCESS CALORIES WITH SERIOUS COMORBIDITY AND BODY MASS INDEX (BMI) OF 33.0 TO 33.9 IN ADULT: ICD-10-CM

## 2024-02-22 DIAGNOSIS — M79.89 RIGHT LEG SWELLING: Primary | ICD-10-CM

## 2024-02-22 DIAGNOSIS — R26.9 GAIT DISTURBANCE: ICD-10-CM

## 2024-02-22 LAB
BH CV LOWER ARTERIAL LEFT ABI RATIO: 1.07
BH CV LOWER ARTERIAL LEFT DORSALIS PEDIS SYS MAX: 135
BH CV LOWER ARTERIAL LEFT GREAT TOE SYS MAX: 108
BH CV LOWER ARTERIAL LEFT POST TIBIAL SYS MAX: 146
BH CV LOWER ARTERIAL RIGHT ABI RATIO: 1.06
BH CV LOWER ARTERIAL RIGHT DORSALIS PEDIS SYS MAX: 132
BH CV LOWER ARTERIAL RIGHT GREAT TOE SYS MAX: 97
BH CV LOWER ARTERIAL RIGHT POST TIBIAL SYS MAX: 144
UPPER ARTERIAL LEFT ARM BRACHIAL SYS MAX: 135
UPPER ARTERIAL RIGHT ARM BRACHIAL SYS MAX: 136

## 2024-02-22 PROCEDURE — 93922 UPR/L XTREMITY ART 2 LEVELS: CPT

## 2024-02-22 NOTE — PROGRESS NOTES
Physical Therapy Daily Treatment Note  [place] [facility] [address] [facility] [place] [address]      Patient: Colton Chiang   : 1960  Diagnosis/ICD-10 Code:  Acute right hip pain [M25.551]  Referring practitioner: PRIMO Schmitz  Date of Initial Visit: Type: THERAPY  Noted: 2024  Today's Date: 2024  Patient seen for 16 sessions           Subjective   The patient reported they feel like they are progressing, though it has been slow. Pt reports their current level of pain is a 6/10.     Objective   See Exercise, Manual, and Modality Logs for complete treatment.     Assessment/Plan  Pt continues to tolerate progression in therapy. Patient will continue to benefit from skilled physical therapy to further address their deficits in strength and ROM in order to improve upon their functional mobility and to return to ADLs w/ improved safety.          Timed:  Manual Therapy:    0     mins  27939;  Therapeutic Exercise:    15     mins  23236;     Neuromuscular Leonel:   0    mins  07714;    Therapeutic Activity:     10     mins  50075;     Gait Trainin     mins  47888;     Aquatics                         0      mins  57929    Un-timed:  Mechanical Traction      0     mins  72677  Electrical Stimulation:    0     mins  66157 ( );      Timed Treatment:   25   mins   Total Treatment:     30   mins    Olaf Almazan PT, DPT    Electronically signed 2024    [place] License: PT - [id number]

## 2024-02-22 NOTE — PROGRESS NOTES
"Chief Complaint  Follow-up of the Right Hip     Subjective      Colton Chiang presents to [facility] for follow up of the right hip. He had a right hip trochanteric nailing with intramedullary hip screw on 10/30/2023.  He ambulates with a cane and is in physical therapy.  He is still limping.  He feels pain in the groin and feels like he is \"sitting on a rock.\" He has swelling and redness in the right lower leg.      Allergies   Allergen Reactions    Penicillin G Hives     Reaction as a kid    Penicillins Irritability        Social History     Socioeconomic History    Marital status:    Tobacco Use    Smoking status: Every Day     Packs/day: 2.00     Years: 45.00     Additional pack years: 0.00     Total pack years: 90.00     Types: Cigarettes     Start date: 1/1/1972    Smokeless tobacco: Never   Vaping Use    Vaping Use: Never used   Substance and Sexual Activity    Alcohol use: Never    Drug use: Never    Sexual activity: Defer        I reviewed the patient's chief complaint, history of present illness, review of systems, past medical history, surgical history, family history, social history, medications, and allergy list.     Review of Systems     Constitutional: Denies fevers, chills, weight loss  Cardiovascular: Denies chest pain, shortness of breath  Skin: Denies rashes, acute skin changes  Neurologic: Denies headache, loss of consciousness        Vital Signs:   /73   Pulse 74   Ht 177.8 cm (70\")   Wt 103 kg (227 lb)   SpO2 93%   BMI 32.57 kg/m²          Physical Exam  General: Alert. No acute distress    Ortho Exam        RIGHT HIP  No skin discoloration, atrophy or swelling. Positive EHL, FHL, GS, and TA. Sensation intact to all 5 nerves of the foot. Negative straight leg raise. Leg lengths appear equal. Ambulates with Antalgic gait Negative Angel. Negative Hyacinth. Good strength to hamstrings, quadriceps, dorsiflexors, and plantar flexors. Knee Extensor " Mechanism  intact        Procedures      Imaging Results (Most Recent)       None             Result Review :     X-Ray Report:  Right hip X-Ray  Indication: Evaluation of the right hip  AP/Lateral view(s)  Findings: Intact inter trochanteric hip nailing.  No signs of loosening, subsidence or periprosthetic fracture.   Prior studies available for comparison: yes       XR Hip With or Without Pelvis 2 - 3 View Right    Result Date: 1/30/2024  Narrative: X-Ray Report: Study: X-rays ordered, taken in the office, and reviewed today. Site: Right hip xray Indication: Right hip ORIF View: AP/Lateral view(s) Findings: Right hip ORIF is intact with stable alignment and routine healing. No evidence of hardware malfunction. Prior studies available for comparison: yes             Assessment and Plan     Diagnoses and all orders for this visit:    1. Closed intertrochanteric fracture of hip, right, initial encounter (Primary)  -     XR Hip With or Without Pelvis 2 - 3 View Right        Discussed the treatment plan with the patient. I reviewed the X-rays that were obtained today with the patient.     Continue physical therapy.  Modify activity as needed.     Educated on risk of smoking/nicotine. Discussed options for smoking cessation. and Call or return if worsening symptoms.    Follow Up     2 months with X ray of the right hip.       Patient was given instructions and counseling regarding his condition or for health maintenance advice. Please see specific information pulled into the AVS if appropriate.     Scribed for Sandra Wolfe MD by Ngozi Vela MA.  02/22/24   08:09 EST    I have personally performed the services described in this document as scribed by the above individual and it is both accurate and complete. Sandra Wolfe MD 02/22/24

## 2024-02-26 ENCOUNTER — TREATMENT (OUTPATIENT)
Dept: PHYSICAL THERAPY | Facility: CLINIC | Age: 64
End: 2024-02-26
Payer: MEDICARE

## 2024-02-26 DIAGNOSIS — Z87.81 S/P ORIF (OPEN REDUCTION INTERNAL FIXATION) FRACTURE: ICD-10-CM

## 2024-02-26 DIAGNOSIS — M62.81 MUSCLE WEAKNESS OF PROXIMAL EXTREMITY: ICD-10-CM

## 2024-02-26 DIAGNOSIS — R26.89 BALANCE DISORDER: ICD-10-CM

## 2024-02-26 DIAGNOSIS — M25.551 ACUTE RIGHT HIP PAIN: Primary | ICD-10-CM

## 2024-02-26 DIAGNOSIS — R26.9 GAIT DISTURBANCE: ICD-10-CM

## 2024-02-26 DIAGNOSIS — Z98.890 S/P ORIF (OPEN REDUCTION INTERNAL FIXATION) FRACTURE: ICD-10-CM

## 2024-02-26 PROCEDURE — 97530 THERAPEUTIC ACTIVITIES: CPT | Performed by: PHYSICAL THERAPIST

## 2024-02-26 PROCEDURE — 97110 THERAPEUTIC EXERCISES: CPT | Performed by: PHYSICAL THERAPIST

## 2024-02-26 PROCEDURE — 97112 NEUROMUSCULAR REEDUCATION: CPT | Performed by: PHYSICAL THERAPIST

## 2024-02-26 NOTE — PROGRESS NOTES
Outpatient Physical Therapy  [address] [facility], [place] [address]                            Physical Therapy Daily Treatment Note    Patient: Colton Chiang   : 1960  Diagnosis/ICD-10 Code:  Acute right hip pain [M25.551]  Referring practitioner: PRIMO Schmitz  Date of Initial Visit: Type: THERAPY  Noted: 2024  Today's Date: 2024  Patient seen for 17 sessions           Subjective   Colton Chiang reports: Pain at R hip due to screw on lateral side, ttp at area.      Objective   Pt demonstrates a significant limp to R side, slight circumduction gait.    See Exercise, Manual, and Modality Logs for complete treatment.     Assessment/Plan  Pt tolerated therex well, w/ pain localized to lateral R hip. Pt would benefit from further hip strengthening therex and gait training to address limp and gait deficits.    Progress per Plan of Care         Timed:  Manual Therapy:          mins  53154;  Therapeutic Exercise:    15     mins  18722;     Neuromuscular Leonel:    15    mins  72793;    Therapeutic Activity:     15     mins  55508;     Gait Training:            mins  82257;        Untimed:  Electrical Stimulation:         mins  28993 ( );  Mechanical Traction:         mins  76223;       Timed Treatment:   45   mins   Total Treatment:     45   mins      Electronically signed:   Caitlyn Lam PTA Student    Supervised By:   Natividad Pearce PTA  Physical Therapist Assistant  [facility] License #: [id number]

## 2024-02-27 ENCOUNTER — TELEPHONE (OUTPATIENT)
Dept: CARDIOLOGY | Facility: CLINIC | Age: 64
End: 2024-02-27
Payer: MEDICARE

## 2024-02-27 ENCOUNTER — OFFICE VISIT (OUTPATIENT)
Dept: VASCULAR SURGERY | Facility: HOSPITAL | Age: 64
End: 2024-02-27
Payer: MEDICARE

## 2024-02-27 VITALS
SYSTOLIC BLOOD PRESSURE: 126 MMHG | OXYGEN SATURATION: 98 % | RESPIRATION RATE: 18 BRPM | TEMPERATURE: 98.5 F | DIASTOLIC BLOOD PRESSURE: 72 MMHG | HEART RATE: 72 BPM

## 2024-02-27 DIAGNOSIS — I87.8 VENOUS STASIS: Primary | ICD-10-CM

## 2024-02-27 DIAGNOSIS — I83.893 VARICOSE VEINS OF BILATERAL LOWER EXTREMITIES WITH OTHER COMPLICATIONS: ICD-10-CM

## 2024-02-27 PROCEDURE — G0463 HOSPITAL OUTPT CLINIC VISIT: HCPCS | Performed by: SURGERY

## 2024-02-27 RX ORDER — FUROSEMIDE 40 MG/1
40 TABLET ORAL DAILY
Qty: 90 TABLET | Refills: 3 | Status: SHIPPED | OUTPATIENT
Start: 2024-02-27

## 2024-02-27 NOTE — TELEPHONE ENCOUNTER
PT WIFE CALLED AND STATED PT SAW DR MEDINA 02/20/24 AND FUROSEMIDE SCRIPT WAS CHANGED TO ONE TABLET DAILY INSTEAD OF HALF TABLET DAILY.  STATED SCRIPT WAS SENT TO THE PHARMACY AND FILLED FOR HALF TABLET INSTEAD OF ONE WHOLE TABLET DAILY.  PT REQUESTING UPDATED SCRIPT.  PLEASE ADVISE.

## 2024-02-27 NOTE — PROGRESS NOTES
[facility]   HISTORY AND PHYSICAL    Patient Name: Colton Chiang  : 1960  MRN: [id number]  Primary Care Physician:  Eduarda Butt DO      Subjective   Subjective     Chief Complaint: Right lower extremity swelling with pain and redness    HPI:    Colton Chiang is a 64 y.o. male smoker with history of previous cerebrovascular accident, hypertension, CHF, arthritis, depression, chronic back pain, hypothyroidism, prostate cancer on Cytomel with history of radiation therapy and dyslipidemia who was seen by Dr. Butt his primary care physician for yearly follow-up and complained of right lower extremity swelling with some pain and redness.  At that time he had been taking an extra dose of 20 mg Lasix every other day to see if his swelling would improve.  It seems he does not like compression stockings although this may still be the main treatment regimen for him.  He has no other complaints of chest pain, chest tightness, shortness of breath, weakness, numbness, nausea, vomiting, dizziness or syncope.   He is a  who works a lot of hours.  He had bilateral lower extremity ABIs performed on 2024 which were within normal limits with the right CARMEN of 1.06 and left 1.07.  His initial RLE swelling seemed to occur after hitting the leg on an iron bed and it seems to have continued since then.  He has a history of fall with R hip rods on 10-30-23 after a fall.      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 Otherwise pertinent FHx was reviewed and not pertinent to current issue.    Social History:  reports that he has been smoking cigarettes. He started smoking about 52 years ago. He has a 90.00 pack-year smoking history. He has never used smokeless tobacco. He reports that he does not drink alcohol and does not use drugs.    Home Medications:  Current Outpatient Medications on File Prior to Visit   Medication Sig    Ascorbic Acid (Vitamin C) 500 MG capsule Take 1 capsule by mouth Daily.    aspirin 81 MG EC tablet Take 1 tablet by mouth Daily.    Coenzyme Q10 (Co Q-10) 300 MG capsule Take 1 capsule by mouth Every Other Day. (Patient taking differently: Take 1 capsule by mouth Daily.)    HYDROcodone-acetaminophen (NORCO) 5-325 MG per tablet Take 1 tablet by mouth Every 6 (Six) Hours As Needed (as needed for pain).    levothyroxine (Synthroid) 137 MCG tablet TAKE 2 TABLETS IN THE      MORNING 30 MINUTES BEFORE  BREAKFAST (INCREASED DOSE)    liothyronine (CYTOMEL) 5 MCG tablet TAKE 2 TABLETS DAILY (Patient taking differently: Take 2 tablets by mouth Daily.)    Magnesium 250 MG tablet Take 1 tablet by mouth Daily.    meloxicam (Mobic) 15 MG tablet Take 1 tablet by mouth Daily.    metoprolol succinate XL (TOPROL-XL) 25 MG 24 hr tablet Take 1/2 tab po qd (Patient taking differently: Take 0.5 tablets by mouth Daily. Take 1/2 tab po qd)    multivitamin with minerals tablet tablet Take 1 tablet by mouth Daily.    Zinc 50 MG capsule Take 50 mg by mouth Daily.     [DISCONTINUED] furosemide (LASIX) 40 MG tablet Take 0.5 tablets by mouth Daily. (Patient taking differently: Take 0.5 tablets by mouth Daily. One tablet daily now)     No current facility-administered medications on file prior to visit.          Allergies:  Allergies   Allergen Reactions    Penicillin G Hives     Reaction as a kid    Penicillins Irritability       Objective   Objective     Vitals:   Temp:  [98.5 °F (36.9 °C)] 98.5 °F (36.9 °C)  Heart Rate:  [72] 72  Resp:  [18] 18  BP: (126)/(72) 126/72    Physical Exam    Constitutional: Awake, alert, NAD   Neck: Supple   Respiratory: Clear to auscultation bilaterally, nonlabored respirations    Cardiovascular: RRR, no murmurs   Abdomen: S/NT/ND, + BS   Extremities: symmetric with bilateral pedal pulses palpable    Result Review    Most notable findings include: Interpretation Summary         Right Conclusion: The right CARMEN is normal. Normal digital pressures.    Left Conclusion: The left CARMEN is normal. Normal digital pressures.    Normal single level arterial evaluation of both lower extremities    Lower Arterial Doppler Pressures     Right Pressure (mmHg) Left Pressure (mmHg)   Brachial 136       135                146                135         Great Toe 97       108            Lower Arterial Doppler Ratios     Right Ratio Left Ratio   CARMEN 1.06       1.07                Assessment & Plan   Assessment / Plan       There are no diagnoses linked to this encounter.     Assessment & Plan:   Colton Chiang is a 64 y.o. 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  2.  The patient may benefit from a venous reflux study in 2 to 3 months after conservative treatment and follow-up with his cardiologist while watching his  diet to decrease his salt intake and continuing with compression and elevation therapy.  3. Will give Rx for 20-30 mm Hg knee high compression stockings to be worn 4-5 days a week while active and elevate in evenings on ottoman or in recliner.  4.  Discussed possible lymphedema and need for lymphedema clinic referral if not improved and no significant reflux but he is currently undergoing PT for his R hip fracture and prefers to wait on this.  5.  Follow up in 3 months with RLE venous reflux study to discuss possible need for referral to [facility] is saphenous vein ablation is indicated versus lymphedema clinic if no significant reflux is noted and not improved with compression and elevation therapy as expected.  6.  Discussed the need for complete smoking cessation with patient as well to avoid future morbidity and mortality and patient voiced understanding although unsure if he will change his lifestyle habits.     Electronically signed by Gustabo Cintron MD, 02/27/24, 1:08 PM EST.

## 2024-02-28 ENCOUNTER — TREATMENT (OUTPATIENT)
Dept: PHYSICAL THERAPY | Facility: CLINIC | Age: 64
End: 2024-02-28
Payer: MEDICARE

## 2024-02-28 DIAGNOSIS — M62.81 MUSCLE WEAKNESS OF PROXIMAL EXTREMITY: ICD-10-CM

## 2024-02-28 DIAGNOSIS — Z87.81 S/P ORIF (OPEN REDUCTION INTERNAL FIXATION) FRACTURE: ICD-10-CM

## 2024-02-28 DIAGNOSIS — R26.9 GAIT DISTURBANCE: ICD-10-CM

## 2024-02-28 DIAGNOSIS — M25.551 ACUTE RIGHT HIP PAIN: Primary | ICD-10-CM

## 2024-02-28 DIAGNOSIS — Z98.890 S/P ORIF (OPEN REDUCTION INTERNAL FIXATION) FRACTURE: ICD-10-CM

## 2024-02-28 DIAGNOSIS — R26.89 BALANCE DISORDER: ICD-10-CM

## 2024-02-28 NOTE — PROGRESS NOTES
Outpatient Physical Therapy  [address] [facility], [place] [address]                            Physical Therapy Daily Treatment Note    Patient: Colton Chiang   : 1960  Diagnosis/ICD-10 Code:  Acute right hip pain [M25.551]  Referring practitioner: PRIMO Schmitz  Date of Initial Visit: Type: THERAPY  Noted: 2024  Today's Date: 2024  Patient seen for 18 sessions           Subjective   Colton Chiang reports: that he still has a lot of pain in his right hip, especially after sitting for an extended period of time. States that his left hip is actually weaker than his right.     Objective   General fatigue.     See Exercise, Manual, and Modality Logs for complete treatment.     Assessment/Plan  Colton still experiencing increased R hip pain, especially after sitting an extended period of time. Pt tolerated exercises well, just general fatigue. Pt would benefit from skilled PT to address Range of Motion  and Strength deficits, pain management and any concerns with ADLs.       Progress per Plan of Care         Timed:  Manual Therapy:         mins  18361;  Therapeutic Exercise:    15     mins  16263;     Neuromuscular Leonel:    10    mins  86868;    Therapeutic Activity:     15     mins  82339;     Gait Training:           mins  39498;        Untimed:  Electrical Stimulation:         mins  78712 ( );  Mechanical Traction:         mins  74829;       Timed Treatment:   40   mins   Total Treatment:     40   mins      Electronically signed:     Natividad Pearce PTA  Physical Therapist Assistant  [facility] License #: [id number]   CONSULT    Patient Name: Juan Frey  Patient MRN: [id number]  Date of Service: 11/10/2018   Date / Time Note Created: 11/10/2018 11:48 AM   Referring Provider: Dr Pauly Salazar  Provider Creating Note: Kell Doe MD    PCP: Jose Priest  Attending Provider:  Derick Jorgensen DO    Reason for Consult: Urinary Retention    HPI   Patient 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  In preparation for management of that the patient is been monitored and his voiding status has deteriorated to the point where he is having difficulty eating  Postvoid residuals have been in the 8-900 cc range  He had been straight cathed for large volumes, and recently an indwelling coude Rosenberg catheter was placed  He has a history of prostatectomy in the past             Impressions  Urinary Retention (Multi-factorial) secondary to recent administration of narcotics, recumbency and constipation  Recommendations  1  Maintain rosenberg catheter  2  Do not remove  Not Nsg managed 3  Will add Flomax 4  In interim, increase ambulation, wean narcotics, hydrate and treat constipation  5  Void trial to be determined by our group when patient is clinically stronger  6  Patient may require rosenberg catheter at home or short term rehab if patient remains sub-optimal  Will follow  Past Medical History:   Diagnosis Date    Coronary artery disease     [facility] mellitus ([facility] [address] )     Hemoptysis     Hypertension     Prostate cancer ([facility] [address] )        Past Surgical History:   Procedure Laterality Date    BRONCHOSCOPY      CORONARY ANGIOPLASTY WITH STENT PLACEMENT      PROSTATECTOMY         Family History   Problem Relation Age of Onset    Heart disease Father        Social History     Social History    Marital status: Unknown     Spouse name: N/A    Number of children: N/A    Years of education: N/A     Occupational History    Not on file       Social History Main Topics    Smoking status: Former Smoker     Packs/day: 1 50     Years: 40 00     Quit date: 12/31/2001    Smokeless tobacco: Never Used    Alcohol use No    Drug use: No    Sexual activity: Not on file     Other Topics Concern    Not on file     Social History Narrative    No narrative on file       Allergies   Allergen Reactions    Plavix [Clopidogrel] Rash       Review of Systems  Review of Systems   Constitutional: Positive for appetite change, chills, fatigue and fever  HENT: Negative  Eyes: Negative  Respiratory: Negative  Cardiovascular: Positive for leg swelling  Gastrointestinal: Negative  Endocrine: Negative  Genitourinary: Positive for difficulty urinating  Musculoskeletal: Negative  Skin: Negative  Allergic/Immunologic: Negative  Neurological: Positive for weakness  Hematological: Negative  Psychiatric/Behavioral: Negative  Chart Review   Allergies, current medications, history, problem list    Vital Signs  Temp:  [97 7 °F (36 5 °C)-98 4 °F (36 9 °C)] 98 4 °F (36 9 °C)  HR:  [76-83] 83  Resp:  [18-22] 22    Physical Exam  Constitutional: He is oriented to person, place, and time  No distress  Frail appearing   Eyes: Conjunctivae are normal  Pupils are equal, round, and reactive to light  Neck: Normal range of motion  Neck supple  No JVD present  No tracheal deviation present  No thyromegaly present  Cardiovascular: Normal rate and regular rhythm  Pulmonary/Chest: Effort normal and breath sounds normal  No respiratory distress  He has no wheezes  Abdominal: Soft  Bowel sounds are normal  He exhibits no distension and no mass  There is no tenderness  There is no rebound and no guarding  Musculoskeletal: Normal range of motion  He exhibits edema  He exhibits no tenderness or deformity  Genitourinary: Rojas catheter in place  Neurological: He is alert and oriented to person, place, and time  Skin: Skin is warm and dry  No rash noted  He is not diaphoretic  No erythema  Psychiatric: He has a normal mood and affect  His behavior is normal     Laboratory Studies  Lab Results   Component Value Date    HGBA1C 5 8 11/02/2018    K 3 6 11/10/2018     11/10/2018    CO2 28 11/10/2018    CREATININE 1 00 11/10/2018    BUN 19 11/10/2018             Imaging and Other Studies  )Cta Head And Neck W Wo Contrast    Result Date: 11/5/2018  Narrative: CTA NECK AND BRAIN WITH AND WITHOUT CONTRAST INDICATION: dizziness COMPARISON:   Outside institution head CT 11/1/2018  TECHNIQUE:  Routine CT imaging of the Brain without contrast   Post contrast imaging was performed after administration of iodinated contrast through the neck and brain  Post contrast axial 0 625 mm images timed to opacify the arterial system  3D rendering was performed on an independent workstation  MIP reconstructions performed  Coronal reconstructions were performed of the noncontrast portion of the brain  Radiation dose length product (DLP) for this visit:  1550 mGy-cm   This examination, like all CT scans performed in the [facility], was performed utilizing techniques to minimize radiation dose exposure, including the use of iterative reconstruction and automated exposure control  IV Contrast:  85 mL of iohexol (OMNIPAQUE)  IMAGE QUALITY:   Diagnostic FINDINGS: NONCONTRAST BRAIN PARENCHYMA: Age appropriate cerebral atrophy  Redemonstration of prominent bifrontal extra-axial space  No intracranial mass, mass effect or midline shift  No acute intracranial hemorrhage  No CT signs of acute infarction  VENTRICLES AND EXTRA-AXIAL SPACES:  Normal for patient's age  VISUALIZED ORBITS AND PARANASAL SINUSES:  The orbits are unremarkable  Inflammatory mucosal thickening of left maxillary sinus and left ethmoidal cells  CERVICAL VASCULATURE AORTIC ARCH AND GREAT VESSELS:  Normal aortic arch and great vessel origins  Normal visualized subclavian vessels   RIGHT VERTEBRAL ARTERY CERVICAL SEGMENT: Presumably congenital small right vertebral artery is patent throughout the neck  LEFT VERTEBRAL ARTERY CERVICAL SEGMENT:  Normal origin  The vessel is normal in caliber throughout the neck  RIGHT EXTRACRANIAL CAROTID SEGMENT: Mild atherosclerotic disease of the distal common carotid artery and proximal cervical internal carotid artery without significant stenosis compared to the more distal ICA  LEFT EXTRACRANIAL CAROTID SEGMENT: There is encasement of the distal cervical internal carotid artery by the large mass without significant narrowing  Mild atherosclerotic disease of the distal common carotid artery and proximal cervical internal carotid artery without significant stenosis compared to the more distal ICA  NASCET criteria was used to determine the degree of internal carotid artery diameter stenosis  INTRACRANIAL VASCULATURE INTERNAL CAROTID ARTERIES:  Normal enhancement of the intracranial portions of the internal carotid arteries with mild atherosclerotic disease involving bilateral carotid siphons  Normal ophthalmic artery origins  Normal ICA terminus  ANTERIOR CIRCULATION:  Symmetric A1 segments and anterior cerebral arteries with normal enhancement  Normal anterior communicating artery  MIDDLE CEREBRAL ARTERY CIRCULATION:  M1 segment and middle cerebral artery branches demonstrate normal enhancement bilaterally  There is moderate presumably atherosclerotic narrowing of the mid M1 segment of left MCA  DISTAL VERTEBRAL ARTERIES: Left dominant vertebral arteries  Normal distal vertebral arteries  Posterior inferior cerebellar artery origins are normal  Normal vertebral basilar junction  BASILAR ARTERY:  Basilar artery is normal in caliber  Normal superior cerebellar arteries  POSTERIOR CEREBRAL ARTERIES: Persistent fetal origin of bilateral posterior cerebral arteries  Both arteries demonstrate normal enhancement     DURAL VENOUS SINUSES:  Normal  NON VASCULAR ANATOMY BONY STRUCTURES:  Advanced degenerative changes of the cervical spine  No acute osseous abnormality  SOFT TISSUES OF THE NECK:  Redemonstration of large soft tissue mass centered at left nasopharyngeal wall with inferior extension to the oropharynx and left  space  Also posterior lateral extension to the left carotid space encasing left internal carotid artery without significant narrowing  Inferiorly the lesion obscures the left parapharyngeal space and extends inferiorly to the level of C3  The mass extends to the left pterygoid plates  There is no appreciable osseous erosion  There is nonopacification of left cervical internal jugular vein in its entire course  There is a 1 cm left level 2A lymph node (series 5 image 150)  Redemonstration of 1 1 cm left thyroid nodule  THORACIC INLET:  Mildly prominent mediastinal lymph nodes in partially visualized upper chest  Pulmonary emphysema  Right upper lobe nodular opacities in partially visualized upper chest, new from CT chest abdomen pelvis 8/30/2018  Impression: 1  Redemonstration of large soft tissue mass centered at the left nasopharyngeal wall with multiregional extension to the oropharynx, left , carotid , and left parapharyngeal spaces as described  The mass encases distal left cervical internal  carotid artery without significant narrowing  None of left internal jugular vein throughout its course in the neck  2   The remainder of the major cervical and intracranial arteries are patent  Moderate presumably atherosclerotic narrowing of mid M1 segment of left MCA  No aneurysm  3   A 1 cm left level 2A lymph node  4   Partially seen right upper lobe nodular opacities, new from 8/30/2018, concerning for infection  Also mildly prominent mediastinal lymph nodes in visualized upper chest  5   Left thyroid lobe 1 1 cm nodule   According to guidelines published in the February 2015 white paper on incidental thyroid nodules, recommend dedicated ultrasound for further evaluation  I personally discussed this study with Laina Foster on 11/5/2018 at 3:48 PM  Workstation performed: [id number]         Medications    Reviewed    Total time spent with patient 30 minutes, >50% spent counseling and/or coordination of care           Jaron Montes MD

## 2024-03-01 ENCOUNTER — TREATMENT (OUTPATIENT)
Dept: PHYSICAL THERAPY | Facility: CLINIC | Age: 64
End: 2024-03-01
Payer: MEDICARE

## 2024-03-01 DIAGNOSIS — M25.551 ACUTE RIGHT HIP PAIN: Primary | ICD-10-CM

## 2024-03-01 DIAGNOSIS — Z87.81 S/P ORIF (OPEN REDUCTION INTERNAL FIXATION) FRACTURE: ICD-10-CM

## 2024-03-01 DIAGNOSIS — M62.81 MUSCLE WEAKNESS OF PROXIMAL EXTREMITY: ICD-10-CM

## 2024-03-01 DIAGNOSIS — R26.89 BALANCE DISORDER: ICD-10-CM

## 2024-03-01 DIAGNOSIS — Z98.890 S/P ORIF (OPEN REDUCTION INTERNAL FIXATION) FRACTURE: ICD-10-CM

## 2024-03-01 DIAGNOSIS — R26.9 GAIT DISTURBANCE: ICD-10-CM

## 2024-03-01 NOTE — PROGRESS NOTES
Outpatient Physical Therapy  [address] [facility], [place] [address]                            Physical Therapy Daily Treatment Note    Patient: Colton Chiang   : 1960  Diagnosis/ICD-10 Code:  Acute right hip pain [M25.551]  Referring practitioner: PRIMO Schmitz  Date of Initial Visit: Type: THERAPY  Noted: 2024  Today's Date: 3/1/2024  Patient seen for 19 sessions           Subjective   Colton Chiang reports: that he had a really good day yesterday, states that he didn't take any pain medication. But he paid for it last night, he was up walking laps around the house and then iced the hip and it started hurting. Took a hydrocodone before PT session today.     Objective   Unable to perform step ups with LLE.     See Exercise, Manual, and Modality Logs for complete treatment.     Assessment/Plan  Colton still experiencing increased R hip pain, although had a pain free day yesterday. Pt tolerated exercises well, unable to perform step ups with the LLE. Pt would benefit from skilled PT to address Range of Motion  and Strength deficits, pain management and any concerns with ADLs.       Progress per Plan of Care         Timed:  Manual Therapy:         mins  61148;  Therapeutic Exercise:    20     mins  79276;     Neuromuscular Leonel:        mins  87900;    Therapeutic Activity:     10     mins  12237;     Gait Training:           mins  89937;        Untimed:  Electrical Stimulation:         mins  21829 ( );  Mechanical Traction:         mins  40101;       Timed Treatment:   30   mins   Total Treatment:     30   mins      Electronically signed:     Natividad Pearce PTA  Physical Therapist Assistant  [facility] License #: [id number]

## 2024-03-05 ENCOUNTER — TREATMENT (OUTPATIENT)
Dept: PHYSICAL THERAPY | Facility: CLINIC | Age: 64
End: 2024-03-05
Payer: MEDICARE

## 2024-03-05 DIAGNOSIS — R26.89 BALANCE DISORDER: ICD-10-CM

## 2024-03-05 DIAGNOSIS — M25.551 ACUTE RIGHT HIP PAIN: Primary | ICD-10-CM

## 2024-03-05 DIAGNOSIS — R26.9 GAIT DISTURBANCE: ICD-10-CM

## 2024-03-05 DIAGNOSIS — Z87.81 S/P ORIF (OPEN REDUCTION INTERNAL FIXATION) FRACTURE: ICD-10-CM

## 2024-03-05 DIAGNOSIS — M62.81 MUSCLE WEAKNESS OF PROXIMAL EXTREMITY: ICD-10-CM

## 2024-03-05 DIAGNOSIS — Z98.890 S/P ORIF (OPEN REDUCTION INTERNAL FIXATION) FRACTURE: ICD-10-CM

## 2024-03-05 NOTE — PROGRESS NOTES
Progress Note / Re-Certification  [place] [facility]: [address] [place] [address]      Patient: Colton Chiang   : 1960  Diagnosis/ICD-10 Code:  Acute right hip pain [M25.551]  Referring practitioner: PRIMO Schmitz  Date of Initial Visit: Type: THERAPY  Noted: 2024  Today's Date: 3/5/2024  Patient seen for 20 sessions      Subjective:   Subjective Questionnaire: LEFS:   Clinical Progress: improved  Home Program Compliance: Yes  Treatment has included: balance/weight-bearing training, ADL retraining, soft tissue mobilization, strengthening, stretching, therapeutic activities, manual therapy, joint mobilization, home exercise program/patient education, gait training, functional ROM exercises, flexibility, body mechanics training, postural training, and neuromuscular re-education    Subjective   Pt reports they feel as though they have made some improvement in the past month. Pt reports their R hip pain is a 4-5/10. Pt states they have walked some, w/n their home, w/o the use of their cane. Pt reports 'its not pretty'. Pt feels as though they will be on a cane moving forward.      Objective   Active Range of Motion     Right Hip   Flexion: 95 degrees with pain  External rotation (90/90): 13 degrees with pain  Internal rotation (90/90): 10 degrees with pain        Strength/Myotome Testing      Left Hip   Planes of Motion   Flexion: 4  Abduction: 4+  Adduction: 4+     Right Hip   Planes of Motion   Flexion: 4-  Abduction: 4  Adduction: 4     Left Knee   Flexion: 4  Extension: 5     Right Knee   Flexion: 4  Extension: 5    See Exercise, Manual, and Modality Logs for complete treatment.     Assessment/Plan  Impairments: abnormal coordination, abnormal gait, abnormal muscle firing, abnormal or restricted ROM, activity intolerance, impaired balance, impaired physical strength, lacks appropriate home exercise program and pain with function      Pt reported to physical therapy s/p R hip  intertrochanteric nailing w/ intermedullary screw on 10/30/2023. pt demonstrates improvement in their strength as well as their perceived deficits described by LEFS. Discussed w/ pt about aquatic therapy, to further improve upon their gait pattern, as they do continue to have a significant limp, limiting their functional mobility. Continued to encourage pt to maintain safety in gait when ambulating at home. Pt's goals will be addressed at this time to reflect their current progress in therapy. Patient will continue to benefit from skilled physical therapy to further address their deficits in strength and balance in order to improve upon their functional mobility and to return to all ADLs and daily tasks w/ decreased effort and improved safety.         Goals  Plan Goals: HIP PROBLEMS     1. The patient complains of R hip pain.              LTG 1: 12 weeks:  The patient will report a pain rating of 1/10 or better in order to improve  tolerance to activities of daily living and improve sleep quality.                          STATUS:  progressing              STG 1a: 6 weeks:  The patient will report a pain rating of 4/10 or better.                          STATUS:  progressing              TREATMENT:  Therapeutic exercises, manual therapy, aquatic therapy, home exercise   instruction, and modalities as needed for pain to include:  electrical stimulation, moist heat, ice,   ultrasound, and diathermy.     2. The patient demonstrates weakness of the R hip.              LTG 2: 12 weeks:  The patient will demonstrate 4+/5 strength for R hip flexion, abduction,  and extension in order to improve hip stability.                          STATUS:  progressing              STG 2a: 6 weeks:  The patient will demonstrate 4/5 strength for R hip flexion, abduction,  and extension.                          STATUS: partially met              TREATMENT: Therapeutic exercises, manual therapy, aquatic therapy, home exercise  instruction,  and modalities as needed for pain to include:  electrical stimulation, moist heat, ice, and ultrasound     3. The patient has gait dysfunction.  LTG 3: 12 weeks:  The patient will ambulate with straight cane, independently, for community distances with minimal limp to the R lower extremity in order to improve mobility and allow patient to perform activities such as shopping and work tasks with greater ease.  STATUS:  revised  STG 3a: The patient will be independent in HEP.  STATUS:  yes    TREATMENT: Gait training, aquatic therapy, therapeutic exercise, and home exercise instruction.     4. Mobility: Walking/Moving Around Functional Limitation                   LTG 4: 12 weeks:  The patient will demonstrate 25% limitation by achieving a score of 60/80 on the Lower Extremity Functional Scale.  STATUS:  progressing  STG 4a: 6 weeks:  The patient will demonstrate 50% limitation by achieving a score of 40/80 on the Lower Extremity Functional Scale.    STATUS:  progressing  TREATMENT:  Manual therapy, therapeutic exercise, home exercise instruction, and modalities as needed to include: moist heat, electrical stimulation, and ultrasound.     Progress toward previous goals: Partially Met        Planned therapy interventions:balance/weight-bearing training, ADL retraining, soft tissue mobilization, strengthening, stretching, therapeutic activities, manual therapy, joint mobilization, home exercise program/patient education, gait training, functional ROM exercises, flexibility, body mechanics training, postural training, and neuromuscular re-education, aquatic therapy      Recommendations: Continue as planned  Timeframe: 3 a week, for 3 months   Prognosis to achieve goals: fair    PT Signature: Olaf Almazan PT, DPT    Electronically signed 3/5/2024    [place] License: PT - [id number]     Based upon review of the patient's progress and continued therapy plan, it is my medical opinion that Colton Chiang should continue  physical therapy treatment [facility]  [address]  [place] [id number] [phone].    Signature: __________________________________  Priscilla Sinha APRN    90 Day Recertification  Certification Period: 3/5/2024 thru 2024  I certify that the therapy services are furnished while this patient is under my care.  The services outlined above are required by this patient, and will be reviewed every 90 days.     PHYSICIAN: Priscilla Sinha APRN  NPI: [id number]      DATE: 24     Please sign and return via fax to [phone]. Thank you, [facility].    Timed:  Manual Therapy:    0     mins  25089;  Therapeutic Exercise:    25     mins  29372;     Neuromuscular Leonel:    0    mins  39971;    Therapeutic Activity:     15     mins  25333;     Gait Trainin     mins  89016;     Aquatics                         0      mins  11972    Un-timed:  Mechanical Traction      0     mins  44505  Dry Needling     0     mins self-pay  Electrical Stimulation:    0     mins  86839 ( );    Timed Treatment:   40   mins   Total Treatment:     40   mins

## 2024-03-06 ENCOUNTER — TREATMENT (OUTPATIENT)
Dept: PHYSICAL THERAPY | Facility: CLINIC | Age: 64
End: 2024-03-06
Payer: MEDICARE

## 2024-03-06 DIAGNOSIS — R26.89 BALANCE DISORDER: ICD-10-CM

## 2024-03-06 DIAGNOSIS — M62.81 MUSCLE WEAKNESS OF PROXIMAL EXTREMITY: ICD-10-CM

## 2024-03-06 DIAGNOSIS — M25.551 ACUTE RIGHT HIP PAIN: Primary | ICD-10-CM

## 2024-03-06 DIAGNOSIS — Z87.81 S/P ORIF (OPEN REDUCTION INTERNAL FIXATION) FRACTURE: ICD-10-CM

## 2024-03-06 DIAGNOSIS — Z98.890 S/P ORIF (OPEN REDUCTION INTERNAL FIXATION) FRACTURE: ICD-10-CM

## 2024-03-06 DIAGNOSIS — R26.9 GAIT DISTURBANCE: ICD-10-CM

## 2024-03-06 NOTE — PROGRESS NOTES
Outpatient Physical Therapy  [address] [facility], [place] [address]                            Physical Therapy Daily Treatment Note    Patient: Colton Chiang   : 1960  Diagnosis/ICD-10 Code:  Acute right hip pain [M25.551]  Referring practitioner: PRIMO Schmitz  Date of Initial Visit: Type: THERAPY  Noted: 2024  Today's Date: 3/6/2024  Patient seen for 21 sessions           Subjective   Colton Chiang reports: that he was knocked down earlier, states that he was unloading a tractor tire and it bounced and hit him. Knocked him on to his right hip.     Pain: 5-6/10 pain, currently    Objective   Tends to sling LLE when ambulating without cane.     See Exercise, Manual, and Modality Logs for complete treatment.     Assessment/Plan  Colton still experiencing increased R hip pain, especially after being knocked down right before therapy. Pt had some increased discomfort throughout PT session. Pt would benefit from skilled PT to address Range of Motion  and Strength deficits, pain management and any concerns with ADLs.       Progress per Plan of Care         Timed:  Manual Therapy:         mins  47528;  Therapeutic Exercise:    15     mins  67855;     Neuromuscular Leonel:        mins  54284;    Therapeutic Activity:     15     mins  16507;     Gait Training:           mins  59049;        Untimed:  Electrical Stimulation:         mins  24855 ( );  Mechanical Traction:         mins  23061;       Timed Treatment:   30   mins   Total Treatment:     30   mins      Electronically signed:     Natividad Pearce PTA  Physical Therapist Assistant  [facility] License #: [id number]

## 2024-03-08 ENCOUNTER — TREATMENT (OUTPATIENT)
Dept: PHYSICAL THERAPY | Facility: CLINIC | Age: 64
End: 2024-03-08
Payer: MEDICARE

## 2024-03-08 DIAGNOSIS — Z87.81 S/P ORIF (OPEN REDUCTION INTERNAL FIXATION) FRACTURE: ICD-10-CM

## 2024-03-08 DIAGNOSIS — R26.9 GAIT DISTURBANCE: ICD-10-CM

## 2024-03-08 DIAGNOSIS — Z98.890 S/P ORIF (OPEN REDUCTION INTERNAL FIXATION) FRACTURE: ICD-10-CM

## 2024-03-08 DIAGNOSIS — R26.89 BALANCE DISORDER: ICD-10-CM

## 2024-03-08 DIAGNOSIS — M25.551 ACUTE RIGHT HIP PAIN: Primary | ICD-10-CM

## 2024-03-08 DIAGNOSIS — M62.81 MUSCLE WEAKNESS OF PROXIMAL EXTREMITY: ICD-10-CM

## 2024-03-08 PROCEDURE — 97110 THERAPEUTIC EXERCISES: CPT | Performed by: PHYSICAL THERAPIST

## 2024-03-08 PROCEDURE — 97530 THERAPEUTIC ACTIVITIES: CPT | Performed by: PHYSICAL THERAPIST

## 2024-03-08 PROCEDURE — 97112 NEUROMUSCULAR REEDUCATION: CPT | Performed by: PHYSICAL THERAPIST

## 2024-03-08 NOTE — PROGRESS NOTES
Outpatient Physical Therapy  [address] [facility] [place] [address]                            Physical Therapy Daily Treatment Note    Patient: Colton Chiang   : 1960  Diagnosis/ICD-10 Code:  Acute right hip pain [M25.551]  Referring practitioner: PRIMO Schmitz  Date of Initial Visit: Type: THERAPY  Noted: 2024  Today's Date: 3/8/2024  Patient seen for 22 sessions           Subjective   Colton Chiang reports: that he is stiff and sore today, states that he did a lot of work yesterday so legs are tired.     Objective   No complaints of increased pain or discomfort.    See Exercise, Manual, and Modality Logs for complete treatment.     Assessment/Plan  Colton progressing as evident by decreased overall R hip pain, although sore after busy day yesterday. Pt tolerated exercises well, just general fatigue. Pt would benefit from skilled PT to address Range of Motion  and Strength deficits, pain management and any concerns with ADLs.         Progress per Plan of Care         Timed:  Manual Therapy:         mins  65755;  Therapeutic Exercise:    15     mins  94639;     Neuromuscular Leonel:    10    mins  37282;    Therapeutic Activity:     15     mins  97726;     Gait Training:           mins  51466;        Untimed:  Electrical Stimulation:         mins  15385 ( );  Mechanical Traction:         mins  27475;       Timed Treatment:   40   mins   Total Treatment:     40   mins      Electronically signed:     Natividad Pearce PTA  Physical Therapist Assistant  [facility] License #: [id number]

## 2024-03-11 ENCOUNTER — TREATMENT (OUTPATIENT)
Dept: PHYSICAL THERAPY | Facility: CLINIC | Age: 64
End: 2024-03-11
Payer: MEDICARE

## 2024-03-11 DIAGNOSIS — M62.81 MUSCLE WEAKNESS OF PROXIMAL EXTREMITY: ICD-10-CM

## 2024-03-11 DIAGNOSIS — Z98.890 S/P ORIF (OPEN REDUCTION INTERNAL FIXATION) FRACTURE: ICD-10-CM

## 2024-03-11 DIAGNOSIS — R26.9 GAIT DISTURBANCE: ICD-10-CM

## 2024-03-11 DIAGNOSIS — R26.89 BALANCE DISORDER: ICD-10-CM

## 2024-03-11 DIAGNOSIS — Z87.81 S/P ORIF (OPEN REDUCTION INTERNAL FIXATION) FRACTURE: ICD-10-CM

## 2024-03-11 DIAGNOSIS — M25.551 ACUTE RIGHT HIP PAIN: Primary | ICD-10-CM

## 2024-03-11 NOTE — PROGRESS NOTES
Physical Therapy Daily Treatment Note  [place] [facility] [address] [facility] [place] [address]      Patient: Colton Chiang   : 1960  Diagnosis/ICD-10 Code:  Acute right hip pain [M25.551]  Referring practitioner: PRIMO Schmitz  Date of Initial Visit: Type: THERAPY  Noted: 2024  Today's Date: 3/11/2024  Patient seen for 23 sessions           Subjective   The patient reported they have had no new changes at this time.     Objective   See Exercise, Manual, and Modality Logs for complete treatment.     Assessment/Plan  Pt tolerated pool therapy well. Pt has decreased pain w/ gait today. Emphasized normalizing gait mechanics w/ fwd and retro gait. Therapy session stopped due to pool mechanical issue.        Timed:  Manual Therapy:    0     mins  13809;  Therapeutic Exercise:    0     mins  26895;     Neuromuscular Leonel:   0    mins  27940;    Therapeutic Activity:     0     mins  98132;     Gait Trainin     mins  82806;     Aquatics                         20      mins  75620    Un-timed:  Mechanical Traction      0     mins  26506  Electrical Stimulation:    0     mins  46786 ( );      Timed Treatment:   20   mins   Total Treatment:     20   mins    Olaf Almazan PT, DPT    Electronically signed 3/11/2024    [place] License: PT - [id number]

## 2024-03-13 ENCOUNTER — TREATMENT (OUTPATIENT)
Dept: PHYSICAL THERAPY | Facility: CLINIC | Age: 64
End: 2024-03-13
Payer: MEDICARE

## 2024-03-13 DIAGNOSIS — M62.81 MUSCLE WEAKNESS OF PROXIMAL EXTREMITY: ICD-10-CM

## 2024-03-13 DIAGNOSIS — M25.551 ACUTE RIGHT HIP PAIN: Primary | ICD-10-CM

## 2024-03-13 DIAGNOSIS — Z98.890 S/P ORIF (OPEN REDUCTION INTERNAL FIXATION) FRACTURE: ICD-10-CM

## 2024-03-13 DIAGNOSIS — R26.89 BALANCE DISORDER: ICD-10-CM

## 2024-03-13 DIAGNOSIS — R26.9 GAIT DISTURBANCE: ICD-10-CM

## 2024-03-13 DIAGNOSIS — Z87.81 S/P ORIF (OPEN REDUCTION INTERNAL FIXATION) FRACTURE: ICD-10-CM

## 2024-03-13 NOTE — PROGRESS NOTES
Outpatient Physical Therapy  [address] [facility], [place] [address]                            Physical Therapy Daily Treatment Note    Patient: Colton Chiang   : 1960  Diagnosis/ICD-10 Code:  Acute right hip pain [M25.551]  Referring practitioner: PRIMO Schmitz  Date of Initial Visit: Type: THERAPY  Noted: 2024  Today's Date: 3/13/2024  Patient seen for 24 sessions           Subjective   Colton Chiang reports: that his left leg is the weaker side still. States that the pain isn't to bad, sometimes he feels like he is sitting on a rock.     Objective   Unable to perform step ups with LLE.    See Exercise, Manual, and Modality Logs for complete treatment.     Assessment/Plan  Colton progressing as evident by decreased overall R hip pain, although still feels like he is sitting on a rock. Pt tolerated exercises well, just general fatigue. Pt would benefit from skilled PT to address Range of Motion  and Strength deficits, pain management and any concerns with ADLs.       Progress per Plan of Care         Timed:  Manual Therapy:         mins  61648;  Therapeutic Exercise:    20     mins  54574;     Neuromuscular Leonel:        mins  71175;    Therapeutic Activity:     10     mins  37295;     Gait Training:           mins  90173;        Untimed:  Electrical Stimulation:         mins  75581 ( );  Mechanical Traction:         mins  07736;       Timed Treatment:   30   mins   Total Treatment:     30   mins      Electronically signed:     Natividad Pearce PTA  Physical Therapist Assistant  [facility] License #: [id number]

## 2024-03-15 ENCOUNTER — TREATMENT (OUTPATIENT)
Dept: PHYSICAL THERAPY | Facility: CLINIC | Age: 64
End: 2024-03-15
Payer: MEDICARE

## 2024-03-15 DIAGNOSIS — Z98.890 S/P ORIF (OPEN REDUCTION INTERNAL FIXATION) FRACTURE: ICD-10-CM

## 2024-03-15 DIAGNOSIS — M62.81 MUSCLE WEAKNESS OF PROXIMAL EXTREMITY: ICD-10-CM

## 2024-03-15 DIAGNOSIS — R26.89 BALANCE DISORDER: ICD-10-CM

## 2024-03-15 DIAGNOSIS — Z87.81 S/P ORIF (OPEN REDUCTION INTERNAL FIXATION) FRACTURE: ICD-10-CM

## 2024-03-15 DIAGNOSIS — R26.9 GAIT DISTURBANCE: ICD-10-CM

## 2024-03-15 DIAGNOSIS — M25.551 ACUTE RIGHT HIP PAIN: Primary | ICD-10-CM

## 2024-03-15 PROCEDURE — 97110 THERAPEUTIC EXERCISES: CPT | Performed by: PHYSICAL THERAPIST

## 2024-03-15 PROCEDURE — 97530 THERAPEUTIC ACTIVITIES: CPT | Performed by: PHYSICAL THERAPIST

## 2024-03-15 PROCEDURE — 97112 NEUROMUSCULAR REEDUCATION: CPT | Performed by: PHYSICAL THERAPIST

## 2024-03-15 RX ORDER — MELOXICAM 15 MG/1
15 TABLET ORAL DAILY
Qty: 30 TABLET | Refills: 2 | Status: SHIPPED | OUTPATIENT
Start: 2024-03-15

## 2024-03-15 NOTE — PROGRESS NOTES
Outpatient Physical Therapy  [address] 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 Going up the ladder didn't bother him.     Objective   Only able to resisted walk going to the right, not strong enough through left.     See Exercise, Manual, and Modality Logs for complete treatment.     Assessment/Plan  Colton progressing as evident by decreased overall R hip pain. Pt tolerated exercises well, just general fatigue. Pt would benefit from skilled PT to address Range of Motion  and Strength deficits, pain management and any concerns with ADLs.       Progress per Plan of Care         Timed:  Manual Therapy:         mins  89585;  Therapeutic Exercise:    15     mins  65030;     Neuromuscular Leonel:    10    mins  37330;    Therapeutic Activity:     15     mins  68471;     Gait Training:           mins  66689;        Untimed:  Electrical Stimulation:         mins  75039 ( );  Mechanical Traction:         mins  52045;       Timed Treatment:   40   mins   Total Treatment:     40   mins      Electronically signed:     Natividad Pearce PTA  Physical Therapist Assistant  [facility] License #: [id number]

## 2024-03-18 ENCOUNTER — TREATMENT (OUTPATIENT)
Dept: PHYSICAL THERAPY | Facility: CLINIC | Age: 64
End: 2024-03-18
Payer: MEDICARE

## 2024-03-18 DIAGNOSIS — Z87.81 S/P ORIF (OPEN REDUCTION INTERNAL FIXATION) FRACTURE: ICD-10-CM

## 2024-03-18 DIAGNOSIS — M62.81 MUSCLE WEAKNESS OF PROXIMAL EXTREMITY: ICD-10-CM

## 2024-03-18 DIAGNOSIS — R26.89 BALANCE DISORDER: ICD-10-CM

## 2024-03-18 DIAGNOSIS — Z98.890 S/P ORIF (OPEN REDUCTION INTERNAL FIXATION) FRACTURE: ICD-10-CM

## 2024-03-18 DIAGNOSIS — M25.551 ACUTE RIGHT HIP PAIN: Primary | ICD-10-CM

## 2024-03-18 DIAGNOSIS — R26.9 GAIT DISTURBANCE: ICD-10-CM

## 2024-03-18 PROCEDURE — 97530 THERAPEUTIC ACTIVITIES: CPT | Performed by: PHYSICAL THERAPIST

## 2024-03-18 PROCEDURE — 97110 THERAPEUTIC EXERCISES: CPT | Performed by: PHYSICAL THERAPIST

## 2024-03-18 NOTE — PROGRESS NOTES
Outpatient Physical Therapy  [address] [facility], [place] [address]                            Physical Therapy Daily Treatment Note    Patient: Colton Chiang   : 1960  Diagnosis/ICD-10 Code:  Acute right hip pain [M25.551]  Referring practitioner: PRIMO Schmitz  Date of Initial Visit: Type: THERAPY  Noted: 2024  Today's Date: 3/18/2024  Patient seen for 26 sessions           Subjective   Colton Chiang reports: He is feeling good, R hip discomfort is about the same.    Pain: 4/10 pain, currently    Objective     See Exercise, Manual, and Modality Logs for complete treatment.     Assessment/Plan  Colton still experiencing increased R hip pain. Pt tolerated exercises well, no complains of increased discomfort. Pt would benefit from skilled PT to address Range of Motion  and Strength deficits, pain management and any concerns with ADLs.     Progress per Plan of Care         Timed:  Manual Therapy:          mins  57335;  Therapeutic Exercise:    15     mins  45204;     Neuromuscular Leonel:         mins  89395;    Therapeutic Activity:     15     mins  42767;     Gait Training:            mins  52680;        Untimed:  Electrical Stimulation:         mins  29675 ( );  Mechanical Traction:         mins  70889;       Timed Treatment:   30   mins   Total Treatment:     30   mins      Electronically signed:   Caitlyn Lam PTA Student    Supervised By:   Natividad Pearce PTA  Physical Therapist Assistant  \[facility]\"" License #: [id number]

## 2024-03-20 ENCOUNTER — TREATMENT (OUTPATIENT)
Dept: PHYSICAL THERAPY | Facility: CLINIC | Age: 64
End: 2024-03-20
Payer: MEDICARE

## 2024-03-20 DIAGNOSIS — M25.551 ACUTE RIGHT HIP PAIN: Primary | ICD-10-CM

## 2024-03-20 DIAGNOSIS — M62.81 MUSCLE WEAKNESS OF PROXIMAL EXTREMITY: ICD-10-CM

## 2024-03-20 DIAGNOSIS — R26.9 GAIT DISTURBANCE: ICD-10-CM

## 2024-03-20 DIAGNOSIS — Z98.890 S/P ORIF (OPEN REDUCTION INTERNAL FIXATION) FRACTURE: ICD-10-CM

## 2024-03-20 DIAGNOSIS — R26.89 BALANCE DISORDER: ICD-10-CM

## 2024-03-20 DIAGNOSIS — Z87.81 S/P ORIF (OPEN REDUCTION INTERNAL FIXATION) FRACTURE: ICD-10-CM

## 2024-03-20 NOTE — PROGRESS NOTES
Physical Therapy Daily Treatment Note  [place] [facility] [address] [facility] [place] [address]      Patient: Colton Chiang   : 1960  Diagnosis/ICD-10 Code:  Acute right hip pain [M25.551]  Referring practitioner: PRIMO Schmitz  Date of Initial Visit: Type: THERAPY  Noted: 2024  Today's Date: 3/20/2024  Patient seen for 27 sessions           Subjective   The patient reported they are a little sore and tired today from work.     Objective   See Exercise, Manual, and Modality Logs for complete treatment.     Assessment/Plan  Pt has improved sensation of muscle firing w/ facilitory cues at hips. Will perform aquatic therapy for further gait training at next session. Patient will continue to benefit from skilled physical therapy to further address their deficits in strength and ROM in order to improve upon their functional mobility and to return to ADLs w/ greater ease.          Timed:  Manual Therapy:    0     mins  59960;  Therapeutic Exercise:    20     mins  34537;     Neuromuscular Leonel:   10    mins  40014;    Therapeutic Activity:     0     mins  40036;     Gait Trainin     mins  77234;     Aquatics                         0      mins  74358    Un-timed:  Mechanical Traction      0     mins  47197  Electrical Stimulation:    0     mins  02766 ( );      Timed Treatment:   30   mins   Total Treatment:     30   mins    Olaf Almazan PT, DPT    Electronically signed 3/20/2024    [place] License: PT - [id number]

## 2024-03-22 ENCOUNTER — TREATMENT (OUTPATIENT)
Dept: PHYSICAL THERAPY | Facility: CLINIC | Age: 64
End: 2024-03-22
Payer: MEDICARE

## 2024-03-22 DIAGNOSIS — R26.9 GAIT DISTURBANCE: ICD-10-CM

## 2024-03-22 DIAGNOSIS — M62.81 MUSCLE WEAKNESS OF PROXIMAL EXTREMITY: ICD-10-CM

## 2024-03-22 DIAGNOSIS — M25.551 ACUTE RIGHT HIP PAIN: Primary | ICD-10-CM

## 2024-03-22 DIAGNOSIS — Z98.890 S/P ORIF (OPEN REDUCTION INTERNAL FIXATION) FRACTURE: ICD-10-CM

## 2024-03-22 DIAGNOSIS — R26.89 BALANCE DISORDER: ICD-10-CM

## 2024-03-22 DIAGNOSIS — Z87.81 S/P ORIF (OPEN REDUCTION INTERNAL FIXATION) FRACTURE: ICD-10-CM

## 2024-03-22 NOTE — PROGRESS NOTES
Physical Therapy Daily Treatment Note  [place] [facility]: [address] [facility] [place] [address]      Patient: Colton Chiang   : 1960  Diagnosis/ICD-10 Code:  Acute right hip pain [M25.551]  Referring practitioner: PRIMO Schmitz  Date of Initial Visit: Type: THERAPY  Noted: 2024  Today's Date: 3/22/2024  Patient seen for 28 sessions           Subjective   The patient reported they are experiencing some increased soreness from the last therapy session in their R hip. Pt reports they felt more muscle fatigue later on the day.     Objective   See Exercise, Manual, and Modality Logs for complete treatment.     Assessment/Plan  Pt has improved gait mechanics w/n the pool. Will continue to use aquatic therapy periodically to further re-assess gait and allow more precise gait training in decreased weight environment. Patient will continue to benefit from skilled physical therapy to further address their deficits in strength and hip stability in order to improve upon their functional mobility and to return to ADLs w/ greater ease.          Timed:  Manual Therapy:    0     mins  51122;  Therapeutic Exercise:    0     mins  93702;     Neuromuscular Leonel:   0    mins  70006;    Therapeutic Activity:     0     mins  55086;     Gait Trainin     mins  61881;     Aquatics                         30      mins  31800    Un-timed:  Mechanical Traction      0     mins  08267  Electrical Stimulation:    0     mins  02891 ( );      Timed Treatment:   30   mins   Total Treatment:     30   mins    Olaf Almazan PT, DPT    Electronically signed 3/22/2024    [place] License: PT - [id number]

## 2024-03-25 ENCOUNTER — TREATMENT (OUTPATIENT)
Dept: PHYSICAL THERAPY | Facility: CLINIC | Age: 64
End: 2024-03-25
Payer: MEDICARE

## 2024-03-25 DIAGNOSIS — Z98.890 S/P ORIF (OPEN REDUCTION INTERNAL FIXATION) FRACTURE: ICD-10-CM

## 2024-03-25 DIAGNOSIS — Z87.81 S/P ORIF (OPEN REDUCTION INTERNAL FIXATION) FRACTURE: ICD-10-CM

## 2024-03-25 DIAGNOSIS — R26.89 BALANCE DISORDER: ICD-10-CM

## 2024-03-25 DIAGNOSIS — M25.551 ACUTE RIGHT HIP PAIN: Primary | ICD-10-CM

## 2024-03-25 DIAGNOSIS — R26.9 GAIT DISTURBANCE: ICD-10-CM

## 2024-03-25 DIAGNOSIS — M62.81 MUSCLE WEAKNESS OF PROXIMAL EXTREMITY: ICD-10-CM

## 2024-03-25 NOTE — PROGRESS NOTES
Outpatient Physical Therapy  [address] [facility], [place] [address]                            Physical Therapy Daily Treatment Note    Patient: Colton Chiang   : 1960  Diagnosis/ICD-10 Code:  Acute right hip pain [M25.551]  Referring practitioner: PRIMO Schmitz  Date of Initial Visit: Type: THERAPY  Noted: 2024  Today's Date: 3/25/2024  Patient seen for 29 sessions           Subjective   Colton Chiang reports: that his right hip was sore for 2 days after last PT session, isn't sure if that's being in the pool or just walking in general. States that he used to take 16 Tylenol a day and he is down to 3.     Objective     See Exercise, Manual, and Modality Logs for complete treatment.     Assessment/Plan  Colton still experiencing increased R hip pain, especially sore after last PT session. Pt tolerated exercises well, with general fatigue. Pt would benefit from skilled PT to address Range of Motion  and Strength deficits, pain management and any concerns with ADLs.       Progress per Plan of Care         Timed:  Manual Therapy:         mins  48609;  Therapeutic Exercise:    20     mins  62103;     Neuromuscular Leonel:        mins  74131;    Therapeutic Activity:     10     mins  96137;     Gait Training:           mins  46805;        Untimed:  Electrical Stimulation:         mins  90368 ( );  Mechanical Traction:         mins  17645;       Timed Treatment:   30   mins   Total Treatment:     30   mins      Electronically signed:     Natividad Pearce PTA  Physical Therapist Assistant  [facility] License #: [id number]

## 2024-03-27 ENCOUNTER — TREATMENT (OUTPATIENT)
Dept: PHYSICAL THERAPY | Facility: CLINIC | Age: 64
End: 2024-03-27
Payer: MEDICARE

## 2024-03-27 DIAGNOSIS — M25.551 ACUTE RIGHT HIP PAIN: Primary | ICD-10-CM

## 2024-03-27 DIAGNOSIS — Z87.81 S/P ORIF (OPEN REDUCTION INTERNAL FIXATION) FRACTURE: ICD-10-CM

## 2024-03-27 DIAGNOSIS — R26.9 GAIT DISTURBANCE: ICD-10-CM

## 2024-03-27 DIAGNOSIS — Z98.890 S/P ORIF (OPEN REDUCTION INTERNAL FIXATION) FRACTURE: ICD-10-CM

## 2024-03-27 DIAGNOSIS — R26.89 BALANCE DISORDER: ICD-10-CM

## 2024-03-27 DIAGNOSIS — M62.81 MUSCLE WEAKNESS OF PROXIMAL EXTREMITY: ICD-10-CM

## 2024-03-27 NOTE — PROGRESS NOTES
Outpatient Physical Therapy  [address] , [place] [address]                            Physical Therapy Daily Treatment Note    Patient: Colton Chiang   : 1960  Diagnosis/ICD-10 Code:  Acute right hip pain [M25.551]  Referring practitioner: PRIMO Schmitz  Date of Initial Visit: Type: THERAPY  Noted: 2024  Today's Date: 3/27/2024  Patient seen for 30 sessions         Subjective Questionnaire: LEFS:      Subjective   Colton Chiang reports: after last PT session he was walking in his house and caught his jacket under his cane and his feet went out from under him.     Pain: 4/10 pain, currently  Best: 1/10 pain, some times all day long  Worse: 4/10 pain, usually after sitting in the truck all day.     Objective   Active Range of Motion      Right Hip   Flexion: 105 degrees no pain   External rotation (90/90): 22 degrees, no pain  Internal rotation (90/90): 19 degrees, no pain        Strength/Myotome Testing      Left Hip   Planes of Motion   Flexion: 4  Abduction: 4+  Adduction: 4+     Right Hip   Planes of Motion   Flexion: 4-  Abduction: 4  Adduction: 4     Left Knee   Flexion: 4+  Extension: 5     Right Knee   Flexion: 4+  Extension: 5    See Exercise, Manual, and Modality Logs for complete treatment.     Assessment/Plan  Colton progressing as evident by decreased overall R hip pain. Pt tolerated exercises well, just general fatigue. Pt would benefit from skilled PT to address Range of Motion  and Strength deficits, pain management and any concerns with ADLs.       Progress per Plan of Care         Timed:  Manual Therapy:         mins  90489;  Therapeutic Exercise:    15     mins  49949;     Neuromuscular Leonel:        mins  40148;    Therapeutic Activity:     15     mins  77375;     Gait Training:           mins  20333;        Untimed:  Electrical Stimulation:         mins  33521 ( );  Mechanical Traction:         mins  99876;       Timed Treatment:   30    mins   Total Treatment:     30   mins      Electronically signed:     Natividad Pearce PTA  Physical Therapist Assistant  [facility] License #: [id number]

## 2024-03-27 NOTE — PROGRESS NOTES
Progress Note   [facility]: [address] [place] [address]      Patient: Colton Chiang   : 1960  Diagnosis/ICD-10 Code:  Acute right hip pain [M25.551]  Referring practitioner: PRIMO Schmitz  Date of Initial Visit: Type: THERAPY  Noted: 2024  Today's Date: 3/27/2024  Patient seen for 30 sessions      Subjective:   Subjective Questionnaire: LEFS:   Clinical Progress: unchanged  Home Program Compliance: Yes  Treatment has included: balance/weight-bearing training, ADL retraining, soft tissue mobilization, strengthening, stretching, therapeutic activities, manual therapy, joint mobilization, home exercise program/patient education, gait training, functional ROM exercises, flexibility, body mechanics training, postural training, and neuromuscular re-education    Subjective   Pt reports their current level of pain is a 4/10 in their R hip. Pt reports they did fall the day of their last therapy appointment. Pt reports their cane was stuck on their sweatshirt that was on the floor.       Objective   Active Range of Motion      Right Hip   Flexion: 105 degrees no pain   External rotation (90/90): 22 degrees, no pain  Internal rotation (90/90): 19 degrees, no pain        Strength/Myotome Testing      Left Hip   Planes of Motion   Flexion: 4  Abduction: 4+  Adduction: 4+     Right Hip   Planes of Motion   Flexion: 4-  Abduction: 4  Adduction: 4     Left Knee   Flexion: 4+  Extension: 5     Right Knee   Flexion: 4+  Extension: 5    See Exercise, Manual, and Modality Logs for complete treatment.     Assessment/Plan  Impairments: abnormal coordination, abnormal gait, abnormal muscle firing, abnormal or restricted ROM, activity intolerance, impaired balance, impaired physical strength, lacks appropriate home exercise program and pain with function      Pt reported to physical therapy s/p R hip intertrochanteric nailing w/ intermedullary screw on 10/30/2023. Pt has minimal changes in their  strength, though improvement in their ROM at this time. Pt has no change in their LEFS. Discussed w/ pt about continuing therapy on land and in the pool to further work on gait mechanics. Cautioned pt about safety due to recent fall. Pt's goals will be addressed at this time to reflect their current progress in therapy. Patient will continue to benefit from skilled physical therapy to further address their deficits in strength and hip stability in order to improve upon their functional mobility and to perform ADLs and work tasks w/ greater ease.         Goals  Plan Goals: HIP PROBLEMS     1. The patient complains of R hip pain.              LTG 1: 12 weeks:  The patient will report a pain rating of 1/10 or better in order to improve  tolerance to activities of daily living and improve sleep quality.                          STATUS:  progressing              STG 1a: 6 weeks:  The patient will report a pain rating of 4/10 or better.                          STATUS:  progressing              TREATMENT:  Therapeutic exercises, manual therapy, aquatic therapy, home exercise   instruction, and modalities as needed for pain to include:  electrical stimulation, moist heat, ice,   ultrasound, and diathermy.     2. The patient demonstrates weakness of the R hip.              LTG 2: 12 weeks:  The patient will demonstrate 4+/5 strength for R hip flexion, abduction,  and extension in order to improve hip stability.                          STATUS:  progressing              STG 2a: 6 weeks:  The patient will demonstrate 4/5 strength for R hip flexion, abduction,  and extension.                          STATUS: partially met              TREATMENT: Therapeutic exercises, manual therapy, aquatic therapy, home exercise instruction,  and modalities as needed for pain to include:  electrical stimulation, moist heat, ice, and ultrasound     3. The patient has gait dysfunction.  LTG 3: 12 weeks:  The patient will ambulate with straight  cane, independently, for community distances with minimal limp to the R lower extremity in order to improve mobility and allow patient to perform activities such as shopping and work tasks with greater ease.  STATUS:  revised  STG 3a: The patient will be independent in HEP.  STATUS:  met  TREATMENT: Gait training, aquatic therapy, therapeutic exercise, and home exercise instruction.     4. Mobility: Walking/Moving Around Functional Limitation                   LTG 4: 12 weeks:  The patient will demonstrate 25% limitation by achieving a score of 60/80 on the Lower Extremity Functional Scale.  STATUS:  progressing  STG 4a: 6 weeks:  The patient will demonstrate 50% limitation by achieving a score of 40/80 on the Lower Extremity Functional Scale.    STATUS:  progressing  TREATMENT:  Manual therapy, therapeutic exercise, home exercise instruction, and modalities as needed to include: moist heat, electrical stimulation, and ultrasound.       Progress toward previous goals: Partially Met      Recommendations: Continue as planned  Timeframe: 3 a week, for 2 months   Prognosis to achieve goals: fair    PT Signature: Olaf Almazan PT, DPT    Electronically signed 3/27/2024    [place] License: PT - [id number]     Based upon review of the patient's progress and continued therapy plan, it is my medical opinion that Colton Chiang should continue physical therapy treatment at [facility] PHYSICAL THERAPY  [address]  [place] [address] [phone].    Signature: __________________________________  Priscilla Sinha APRN    90 Day Recertification  Certification Period: 3/27/2024 thru 6/24/2024  I certify that the therapy services are furnished while this patient is under my care.  The services outlined above are required by this patient, and will be reviewed every 90 days.     PHYSICIAN: Priscilla Sinha APRN  NPI: [id number]      DATE: 03/27/24     Please sign and return via fax to [phone]. Thank you,  [facility].    Timed:  Manual Therapy:    0     mins  58944;  Therapeutic Exercise:    8     mins  11624;     Neuromuscular Leonel:    0    mins  14748;    Therapeutic Activity:     0     mins  44049;     Gait Trainin     mins  77371;     Aquatics                         0      mins  25071    Un-timed:  Mechanical Traction      0     mins  57946  Dry Needling     0     mins self-pay  Electrical Stimulation:    0     mins  64585 ( );    Timed Treatment:   8   mins   Total Treatment:     8   mins

## 2024-03-29 ENCOUNTER — TREATMENT (OUTPATIENT)
Dept: PHYSICAL THERAPY | Facility: CLINIC | Age: 64
End: 2024-03-29
Payer: MEDICARE

## 2024-03-29 DIAGNOSIS — R26.89 BALANCE DISORDER: ICD-10-CM

## 2024-03-29 DIAGNOSIS — M25.551 ACUTE RIGHT HIP PAIN: Primary | ICD-10-CM

## 2024-03-29 DIAGNOSIS — Z98.890 S/P ORIF (OPEN REDUCTION INTERNAL FIXATION) FRACTURE: ICD-10-CM

## 2024-03-29 DIAGNOSIS — R26.9 GAIT DISTURBANCE: ICD-10-CM

## 2024-03-29 DIAGNOSIS — M62.81 MUSCLE WEAKNESS OF PROXIMAL EXTREMITY: ICD-10-CM

## 2024-03-29 DIAGNOSIS — Z87.81 S/P ORIF (OPEN REDUCTION INTERNAL FIXATION) FRACTURE: ICD-10-CM

## 2024-03-29 NOTE — PROGRESS NOTES
Physical Therapy Daily Treatment Note  [place] [facility]: [address] [facility] [place] [address]      Patient: Colton Chiang   : 1960  Diagnosis/ICD-10 Code:  Acute right hip pain [M25.551]  Referring practitioner: PRIMO Schmitz  Date of Initial Visit: Type: THERAPY  Noted: 2024  Today's Date: 3/29/2024  Patient seen for 31 sessions           Subjective   The patient reported they have had no changes at this time. Pt reports when they are ambulating, they don't feel like their R LE is on the ground.     Objective   See Exercise, Manual, and Modality Logs for complete treatment.     Assessment/Plan  Pt continues to have improved gait w/n the pool. Discussed w/ pt about proprioception and practicing standing on their R LE. Patient will continue to benefit from skilled physical therapy to further address their deficits in strength and hip stability in order to improve upon their functional mobility and to return to ADLs w/ improved function.          Timed:  Manual Therapy:    0     mins  61638;  Therapeutic Exercise:    0     mins  53634;     Neuromuscular Leonel:   0    mins  45755;    Therapeutic Activity:     0     mins  08594;     Gait Trainin     mins  91864;     Aquatics                         30      mins  91276    Un-timed:  Mechanical Traction      0     mins  92401  Electrical Stimulation:    0     mins  04096 ( );      Timed Treatment:   30   mins   Total Treatment:     30   mins    Olaf Almazan PT, DPT    Electronically signed 3/29/2024    [place] License: PT - [id number]

## 2024-04-01 ENCOUNTER — TREATMENT (OUTPATIENT)
Dept: PHYSICAL THERAPY | Facility: CLINIC | Age: 64
End: 2024-04-01
Payer: MEDICARE

## 2024-04-01 DIAGNOSIS — R26.9 GAIT DISTURBANCE: ICD-10-CM

## 2024-04-01 DIAGNOSIS — M62.81 MUSCLE WEAKNESS OF PROXIMAL EXTREMITY: ICD-10-CM

## 2024-04-01 DIAGNOSIS — M25.551 ACUTE RIGHT HIP PAIN: Primary | ICD-10-CM

## 2024-04-01 DIAGNOSIS — R26.89 BALANCE DISORDER: ICD-10-CM

## 2024-04-01 DIAGNOSIS — Z98.890 S/P ORIF (OPEN REDUCTION INTERNAL FIXATION) FRACTURE: ICD-10-CM

## 2024-04-01 DIAGNOSIS — Z87.81 S/P ORIF (OPEN REDUCTION INTERNAL FIXATION) FRACTURE: ICD-10-CM

## 2024-04-01 NOTE — PROGRESS NOTES
Outpatient Physical Therapy  [address] [facility], [place] [address]                            Physical Therapy Daily Treatment Note    Patient: Colton Chiang   : 1960  Diagnosis/ICD-10 Code:  Acute right hip pain [M25.551]  Referring practitioner: PRIMO Schmitz  Date of Initial Visit: Type: THERAPY  Noted: 2024  Today's Date: 2024  Patient seen for 32 sessions           Subjective   Colton Chiang reports: that he was very sore after aquatic PT session on Friday. States that he rode around in the truck all day Saturday and again this morning.     Objective   Increased difficulty with standing marching activity    See Exercise, Manual, and Modality Logs for complete treatment.     Assessment/Plan  Colton still experiencing increased R hip pain, especially sore after aquatic PT session. Pt tolerated exercises well, increased difficulty with all single leg stance activities. Pt would benefit from skilled PT to address Range of Motion  and Strength deficits, pain management and any concerns with ADLs.       Progress per Plan of Care         Timed:  Manual Therapy:         mins  52626;  Therapeutic Exercise:    15     mins  20061;     Neuromuscular Leonel:        mins  14628;    Therapeutic Activity:     15     mins  00259;     Gait Training:           mins  65250;        Untimed:  Electrical Stimulation:         mins  07390 ( );  Mechanical Traction:         mins  12133;       Timed Treatment:   30   mins   Total Treatment:     30   mins      Electronically signed:     Natividad Pearce PTA  Physical Therapist Assistant  [place] [facility] License #: [id number]

## 2024-04-03 ENCOUNTER — TREATMENT (OUTPATIENT)
Dept: PHYSICAL THERAPY | Facility: CLINIC | Age: 64
End: 2024-04-03
Payer: MEDICARE

## 2024-04-03 DIAGNOSIS — M62.81 MUSCLE WEAKNESS OF PROXIMAL EXTREMITY: ICD-10-CM

## 2024-04-03 DIAGNOSIS — Z98.890 S/P ORIF (OPEN REDUCTION INTERNAL FIXATION) FRACTURE: ICD-10-CM

## 2024-04-03 DIAGNOSIS — R26.89 BALANCE DISORDER: ICD-10-CM

## 2024-04-03 DIAGNOSIS — R26.9 GAIT DISTURBANCE: ICD-10-CM

## 2024-04-03 DIAGNOSIS — Z87.81 S/P ORIF (OPEN REDUCTION INTERNAL FIXATION) FRACTURE: ICD-10-CM

## 2024-04-03 DIAGNOSIS — M25.551 ACUTE RIGHT HIP PAIN: Primary | ICD-10-CM

## 2024-04-03 NOTE — PROGRESS NOTES
Physical Therapy Daily Treatment Note  [place] [facility]: [address] [facility] [place] [address]      Patient: Colton Chiang   : 1960  Diagnosis/ICD-10 Code:  Acute right hip pain [M25.551]  Referring practitioner: PRIMO Schmitz  Date of Initial Visit: Type: THERAPY  Noted: 2024  Today's Date: 4/3/2024  Patient seen for 33 sessions           Subjective   The patient reported they did have to perform some stairs today. Pt reports there was no rail, so they elected to descend on their bottom. Pt felt as though this was the safest way.     Objective   See Exercise, Manual, and Modality Logs for complete treatment.     Assessment/Plan  Pt ambulates well w/o using hands in aquatic stetting. Will continue to utilize this setting to improve upon their gait for improved safety in gait and ADLs.        Timed:  Manual Therapy:    0     mins  31729;  Therapeutic Exercise:    0     mins  30662;     Neuromuscular Leonel:   0    mins  42415;    Therapeutic Activity:     0     mins  90933;     Gait Trainin     mins  38229;     Aquatics                         30      mins  12987    Un-timed:  Mechanical Traction      0     mins  89600  Electrical Stimulation:    0     mins  46445 ( );      Timed Treatment:   30   mins   Total Treatment:     30   mins    Olaf Almazan PT, DPT    Electronically signed 4/3/2024    [place] License: PT - [id number]

## 2024-04-04 RX ORDER — LIOTHYRONINE SODIUM 5 UG/1
TABLET ORAL
Qty: 180 TABLET | Refills: 0 | Status: SHIPPED | OUTPATIENT
Start: 2024-04-04

## 2024-04-05 ENCOUNTER — TREATMENT (OUTPATIENT)
Dept: PHYSICAL THERAPY | Facility: CLINIC | Age: 64
End: 2024-04-05
Payer: MEDICARE

## 2024-04-05 DIAGNOSIS — Z87.81 S/P ORIF (OPEN REDUCTION INTERNAL FIXATION) FRACTURE: ICD-10-CM

## 2024-04-05 DIAGNOSIS — Z98.890 S/P ORIF (OPEN REDUCTION INTERNAL FIXATION) FRACTURE: ICD-10-CM

## 2024-04-05 DIAGNOSIS — R26.89 BALANCE DISORDER: ICD-10-CM

## 2024-04-05 DIAGNOSIS — R26.9 GAIT DISTURBANCE: ICD-10-CM

## 2024-04-05 DIAGNOSIS — M62.81 MUSCLE WEAKNESS OF PROXIMAL EXTREMITY: ICD-10-CM

## 2024-04-05 DIAGNOSIS — M25.551 ACUTE RIGHT HIP PAIN: Primary | ICD-10-CM

## 2024-04-05 PROCEDURE — 97110 THERAPEUTIC EXERCISES: CPT | Performed by: PHYSICAL THERAPIST

## 2024-04-05 PROCEDURE — 97530 THERAPEUTIC ACTIVITIES: CPT | Performed by: PHYSICAL THERAPIST

## 2024-04-05 NOTE — PROGRESS NOTES
Outpatient Physical Therapy  [address] [facility], [place] [address]                            Physical Therapy Daily Treatment Note    Patient: Colton Chiang   : 1960  Diagnosis/ICD-10 Code:  Acute right hip pain [M25.551]  Referring practitioner: PRIMO Schmitz  Date of Initial Visit: Type: THERAPY  Noted: 2024  Today's Date: 2024  Patient seen for 34 sessions           Subjective   Colton Chiang reports: that he is quite sore after last PT session, states that it's all on the lateral side of the hip. Usually sore after     Objective   Decreased stance time on the RLE with marching.    See Exercise, Manual, and Modality Logs for complete treatment.     Assessment/Plan  Colton still experiencing increased R hip pain, especially after sitting in the truck all day. Pt demonstrated decreased stance time on RLE. Pt would benefit from skilled PT to address Range of Motion  and Strength deficits, pain management and any concerns with ADLs.       Progress per Plan of Care         Timed:  Manual Therapy:         mins  64195;  Therapeutic Exercise:    15     mins  05499;     Neuromuscular Leonel:        mins  68970;    Therapeutic Activity:     15     mins  39769;     Gait Training:           mins  47826;        Untimed:  Electrical Stimulation:         mins  80659 ( );  Mechanical Traction:         mins  65960;       Timed Treatment:   30   mins   Total Treatment:     30   mins      Electronically signed:     Natividad Pearce PTA  Physical Therapist Assistant  [facility] License #: [id number]

## 2024-04-08 RX ORDER — LEVOTHYROXINE SODIUM 137 UG/1
TABLET ORAL
Qty: 180 TABLET | Refills: 1 | Status: SHIPPED | OUTPATIENT
Start: 2024-04-08

## 2024-04-10 ENCOUNTER — TREATMENT (OUTPATIENT)
Dept: PHYSICAL THERAPY | Facility: CLINIC | Age: 64
End: 2024-04-10
Payer: MEDICARE

## 2024-04-10 DIAGNOSIS — R26.9 GAIT DISTURBANCE: ICD-10-CM

## 2024-04-10 DIAGNOSIS — R26.89 BALANCE DISORDER: ICD-10-CM

## 2024-04-10 DIAGNOSIS — Z98.890 S/P ORIF (OPEN REDUCTION INTERNAL FIXATION) FRACTURE: ICD-10-CM

## 2024-04-10 DIAGNOSIS — Z87.81 S/P ORIF (OPEN REDUCTION INTERNAL FIXATION) FRACTURE: ICD-10-CM

## 2024-04-10 DIAGNOSIS — M25.551 ACUTE RIGHT HIP PAIN: Primary | ICD-10-CM

## 2024-04-10 DIAGNOSIS — M62.81 MUSCLE WEAKNESS OF PROXIMAL EXTREMITY: ICD-10-CM

## 2024-04-10 NOTE — PROGRESS NOTES
Outpatient Physical Therapy  [address] [facility], [place] [address]                            Physical Therapy Aquatic Treatment Note      Patient: Colton Chiang   : 1960  Diagnosis/ICD-10 Code:  Acute right hip pain [M25.551]  Referring practitioner: PRIMO Schmitz  Date of Initial Visit: Type: THERAPY  Noted: 2024  Today's Date: 4/10/2024  Patient seen for 35 sessions         Subjective   Colton Chiang reports: he feels like he is sitting on a rock on his right side when he is seated and the pain is down the side of his hip. Pt reports that he has quit taking his pain medicine including tylenol        Objective   No complaints of increased pain or discomfort  No need to modify aquatic exercises.    See Exercise, Manual, and Modality Logs for complete treatment.     Assessment/Plan  Colton presents to therapy with some discomfort and mild pain in his right hip. Pt tolerated aquatic exercises well, no complaints of increased pain or discomfort. Pt would benefit from skilled PT to address Range of Motion  and Strength deficits, pain management and any concerns with ADLs.       Progress per Plan of Care         Timed:  Manual Therapy:         mins  90995;  Therapeutic Exercise:         mins  45928;     Neuromuscular Leonel:        mins  23090;    Therapeutic Activity:          mins  38453;     Gait Training:           mins  64248;    Aquatic Therapy:    30     mins  32433;       Untimed:  Electrical Stimulation:        mins  01695 ( );  Mechanical Traction:        mins  88361;       Timed Treatment:   30 mins   Total Treatment:     30   mins      Electronically signed:   Judy Mcfadden PTA Student     Supervised by:  Natividad Pearce PTA  Physical Therapist Assistant  [facility] License #: [id number]

## 2024-04-12 ENCOUNTER — TREATMENT (OUTPATIENT)
Dept: PHYSICAL THERAPY | Facility: CLINIC | Age: 64
End: 2024-04-12
Payer: MEDICARE

## 2024-04-12 DIAGNOSIS — M25.551 ACUTE RIGHT HIP PAIN: Primary | ICD-10-CM

## 2024-04-12 DIAGNOSIS — Z87.81 S/P ORIF (OPEN REDUCTION INTERNAL FIXATION) FRACTURE: ICD-10-CM

## 2024-04-12 DIAGNOSIS — Z98.890 S/P ORIF (OPEN REDUCTION INTERNAL FIXATION) FRACTURE: ICD-10-CM

## 2024-04-12 DIAGNOSIS — M62.81 MUSCLE WEAKNESS OF PROXIMAL EXTREMITY: ICD-10-CM

## 2024-04-12 DIAGNOSIS — R26.9 GAIT DISTURBANCE: ICD-10-CM

## 2024-04-12 DIAGNOSIS — R26.89 BALANCE DISORDER: ICD-10-CM

## 2024-04-12 NOTE — PROGRESS NOTES
Outpatient Physical Therapy  [address] [facility], [place] [address]                            Physical Therapy Daily Treatment Note    Patient: Colton Chiang   : 1960  Diagnosis/ICD-10 Code:  Acute right hip pain [M25.551]  Referring practitioner: PRIMO Schmitz  Date of Initial Visit: Type: THERAPY  Noted: 2024  Today's Date: 2024  Patient seen for 36 sessions           Subjective   Colton Chiang reports: he is feeling good today, coming into therapy no pain in right hip.        Objective       See Exercise, Manual, and Modality Logs for complete treatment.     Assessment/Plan  Colton progressing as evident by decreased overall right hip pain this treatment session. Pt tolerated exercises well, no complaints of increased pain or discomfort. Pt would benefit from skilled PT to address Range of Motion  and Strength deficits, pain management and any concerns with ADLs.       Progress per Plan of Care         Timed:  Manual Therapy:         mins  82231;  Therapeutic Exercise:    15     mins  81200;     Neuromuscular Leonel:        mins  73815;    Therapeutic Activity:     15     mins  33590;     Gait Training:           mins  96009;        Untimed:  Electrical Stimulation:         mins  23726 ( );  Mechanical Traction:         mins  59503;       Timed Treatment:   30   mins   Total Treatment:     30   mins      Electronically signed:   Judy Mcfadden PTA Student     Natividad Pearce PTA  Physical Therapist Assistant  [facility] License #: [id number]

## 2024-04-15 ENCOUNTER — TREATMENT (OUTPATIENT)
Dept: PHYSICAL THERAPY | Facility: CLINIC | Age: 64
End: 2024-04-15
Payer: MEDICARE

## 2024-04-15 DIAGNOSIS — R26.89 BALANCE DISORDER: ICD-10-CM

## 2024-04-15 DIAGNOSIS — M62.81 MUSCLE WEAKNESS OF PROXIMAL EXTREMITY: ICD-10-CM

## 2024-04-15 DIAGNOSIS — Z98.890 S/P ORIF (OPEN REDUCTION INTERNAL FIXATION) FRACTURE: ICD-10-CM

## 2024-04-15 DIAGNOSIS — M25.551 ACUTE RIGHT HIP PAIN: Primary | ICD-10-CM

## 2024-04-15 DIAGNOSIS — R26.9 GAIT DISTURBANCE: ICD-10-CM

## 2024-04-15 DIAGNOSIS — Z87.81 S/P ORIF (OPEN REDUCTION INTERNAL FIXATION) FRACTURE: ICD-10-CM

## 2024-04-15 PROCEDURE — 97530 THERAPEUTIC ACTIVITIES: CPT

## 2024-04-15 PROCEDURE — 97110 THERAPEUTIC EXERCISES: CPT

## 2024-04-15 NOTE — PROGRESS NOTES
Outpatient Physical Therapy  [address] [facility], [place] [address]                            Physical Therapy Daily Treatment Note    Patient: Colton Chiang   : 1960  Diagnosis/ICD-10 Code:  Acute right hip pain [M25.551]  Referring practitioner: PRIMO Schmitz  Date of Initial Visit: Type: THERAPY  Noted: 2024  Today's Date: 4/15/2024  Patient seen for 37 sessions           Subjective   Colton Chiang reports: he is feeling good today. Pt reports he walked a short distance without his cane at home.        Objective       See Exercise, Manual, and Modality Logs for complete treatment.     Assessment/Plan  Colton progressing as evident by decreased overall right hip pain. Pt tolerated exercises well, no complaints of increased pain or discomfort. Pt would benefit from skilled PT to address Range of Motion  and Strength deficits, pain management and any concerns with ADLs.       Progress per Plan of Care         Timed:  Manual Therapy:         mins  23136;  Therapeutic Exercise:    15     mins  11195;     Neuromuscular Leonel:        mins  28786;    Therapeutic Activity:     15     mins  00168;     Gait Training:           mins  55424;        Untimed:  Electrical Stimulation:         mins  27049 ( );  Mechanical Traction:         mins  20789;       Timed Treatment:   30   mins   Total Treatment:     30   mins      Electronically signed:   Judy Mcfadden PTA Student     Natividad Pearce PTA  Physical Therapist Assistant  [facility] License #: [id number]

## 2024-04-17 ENCOUNTER — TREATMENT (OUTPATIENT)
Dept: PHYSICAL THERAPY | Facility: CLINIC | Age: 64
End: 2024-04-17
Payer: MEDICARE

## 2024-04-17 DIAGNOSIS — M25.551 ACUTE RIGHT HIP PAIN: Primary | ICD-10-CM

## 2024-04-17 DIAGNOSIS — Z87.81 S/P ORIF (OPEN REDUCTION INTERNAL FIXATION) FRACTURE: ICD-10-CM

## 2024-04-17 DIAGNOSIS — R26.9 GAIT DISTURBANCE: ICD-10-CM

## 2024-04-17 DIAGNOSIS — Z98.890 S/P ORIF (OPEN REDUCTION INTERNAL FIXATION) FRACTURE: ICD-10-CM

## 2024-04-17 DIAGNOSIS — R26.89 BALANCE DISORDER: ICD-10-CM

## 2024-04-17 DIAGNOSIS — M62.81 MUSCLE WEAKNESS OF PROXIMAL EXTREMITY: ICD-10-CM

## 2024-04-17 NOTE — PROGRESS NOTES
"                           Outpatient Physical Therapy  [address], [place] [address]                            Physical Therapy Daily Treatment Note    Patient: Colton Chiang   : 1960  Diagnosis/ICD-10 Code:  Acute right hip pain [M25.551]  Referring practitioner: PRIMO Schmitz  Date of Initial Visit: Type: THERAPY  Noted: 2024  Today's Date: 2024  Patient seen for 38 sessions           Subjective   Colton Chiang reports: Pt reports that his right leg/hip has been bothering him a little bit more today and \" there is a little more swelling in my right ankle.\" Pt states that he is supposed to wear compression stockings but doesn't want to.        Objective       See Exercise, Manual, and Modality Logs for complete treatment.     Assessment/Plan  Colton progressing as evident by decreased overall right hip pain. Pt tolerated aquatic exercises well, no complaints of increased pain or discomfort. Pt would benefit from skilled PT to address Range of Motion  and Strength deficits, pain management and any concerns with ADLs.       Progress per Plan of Care         Timed:  Manual Therapy:         mins  42523;  Therapeutic Exercise:         mins  43920;     Neuromuscular Leonel:        mins  06568;    Therapeutic Activity:          mins  83345;     Gait Training:           mins  09624;    Aquatic _30__ min 94772    Untimed:  Electrical Stimulation:         mins  47718 ( );  Mechanical Traction:         mins  07594;       Timed Treatment:   30   mins   Total Treatment:     30   mins      Electronically signed:   Judy Mcfadden PTA Student     Natividad Pearce PTA  Physical Therapist Assistant  [facility] License #: [id number]  "

## 2024-04-18 ENCOUNTER — TREATMENT (OUTPATIENT)
Dept: PHYSICAL THERAPY | Facility: CLINIC | Age: 64
End: 2024-04-18
Payer: MEDICARE

## 2024-04-18 DIAGNOSIS — M62.81 MUSCLE WEAKNESS OF PROXIMAL EXTREMITY: ICD-10-CM

## 2024-04-18 DIAGNOSIS — Z98.890 S/P ORIF (OPEN REDUCTION INTERNAL FIXATION) FRACTURE: ICD-10-CM

## 2024-04-18 DIAGNOSIS — Z87.81 S/P ORIF (OPEN REDUCTION INTERNAL FIXATION) FRACTURE: ICD-10-CM

## 2024-04-18 DIAGNOSIS — R26.9 GAIT DISTURBANCE: ICD-10-CM

## 2024-04-18 DIAGNOSIS — R26.89 BALANCE DISORDER: ICD-10-CM

## 2024-04-18 DIAGNOSIS — M25.551 ACUTE RIGHT HIP PAIN: Primary | ICD-10-CM

## 2024-04-18 NOTE — PROGRESS NOTES
"                           Outpatient Physical Therapy  [address], [place] [address]                            Physical Therapy Daily Treatment Note    Patient: Colton Chiang   : 1960  Diagnosis/ICD-10 Code:  Acute right hip pain [M25.551]  Referring practitioner: RPIMO Schmitz  Date of Initial Visit: Type: THERAPY  Noted: 2024  Today's Date: 2024  Patient seen for 39 sessions           Subjective   Colton Chiang reports: \"typical soreness in right hip.\" Pt states he does not wear his compression sock since it makes him hot.        Objective       See Exercise, Manual, and Modality Logs for complete treatment.     Assessment/Plan  Colton progressing as evident by decreasing pain in R hip. Pt able to increase activity and endurance without any complaints of increase pain or discomfort. Pt demo and educated on proper use of AD with ascending/descending stairs. Pt would benefit from skilled PT to address balance, gait mechanics, and Strength deficits, pain management and any concerns with ADLs.       Progress per Plan of Care         Timed:  Manual Therapy:         mins  44997;  Therapeutic Exercise:    20     mins  44522;     Neuromuscular Leonel:        mins  78595;    Therapeutic Activity:     25     mins  90236;     Gait Training:           mins  52199;        Untimed:  Electrical Stimulation:         mins  24438 ( );  Mechanical Traction:         mins  09487;       Timed Treatment:   45   mins   Total Treatment:     45   mins      Electronically signed:   Judy Mcfadden, PTA Student     Olaf Almazan, PT  Physical Therapist Assistant  [facility] License #: [id number]  "

## 2024-04-22 ENCOUNTER — TREATMENT (OUTPATIENT)
Dept: PHYSICAL THERAPY | Facility: CLINIC | Age: 64
End: 2024-04-22
Payer: MEDICARE

## 2024-04-22 DIAGNOSIS — R26.9 GAIT DISTURBANCE: ICD-10-CM

## 2024-04-22 DIAGNOSIS — Z87.81 S/P ORIF (OPEN REDUCTION INTERNAL FIXATION) FRACTURE: ICD-10-CM

## 2024-04-22 DIAGNOSIS — M25.551 ACUTE RIGHT HIP PAIN: Primary | ICD-10-CM

## 2024-04-22 DIAGNOSIS — R26.89 BALANCE DISORDER: ICD-10-CM

## 2024-04-22 DIAGNOSIS — Z98.890 S/P ORIF (OPEN REDUCTION INTERNAL FIXATION) FRACTURE: ICD-10-CM

## 2024-04-22 DIAGNOSIS — M62.81 MUSCLE WEAKNESS OF PROXIMAL EXTREMITY: ICD-10-CM

## 2024-04-22 PROCEDURE — 97110 THERAPEUTIC EXERCISES: CPT

## 2024-04-22 NOTE — PROGRESS NOTES
Progress Note / Re-Certification  [facility]: [address] [place] [address]      Patient: Colton Chiang   : 1960  Diagnosis/ICD-10 Code:  Acute right hip pain [M25.551]  Referring practitioner: PRIMO Schmitz  Date of Initial Visit: Type: THERAPY  Noted: 2024  Today's Date: 2024  Patient seen for 40 sessions      Subjective:   Subjective Questionnaire: LEFS: 34/80  Clinical Progress: improved  Home Program Compliance: Yes  Treatment has included: balance/weight-bearing training, ADL retraining, soft tissue mobilization, strengthening, stretching, therapeutic activities, manual therapy, joint mobilization, home exercise program/patient education, gait training, functional ROM exercises, flexibility, body mechanics training, postural training, and neuromuscular re-education, aquatic therapy    Subjective   Pt reports they feel as though they have made some progress w/n the last month of therapy. Pt reports they did take some tylenol this AM, as they are more sore from work. Pt reports a constant soreness to their R thigh. Pt rates their soreness as a 5/10 today. Pt reports they have started doing more chores at home.       Objective   Active Range of Motion      Right Hip   Flexion: 105 degrees no pain   External rotation (90/90): 22 degrees, no pain  Internal rotation (90/90): 15 degrees, no pain        Strength/Myotome Testing      Left Hip   Planes of Motion   Flexion: 4+  Abduction: 4+  Adduction: 4+     Right Hip   Planes of Motion   Flexion: 4-  Abduction: 4  Adduction: 4+     Left Knee   Flexion: 5  Extension: 5     Right Knee   Flexion: 5  Extension: 5      TU.61 w/ straight cane     30sec STS: 9 w/ no UE push and no AD        See Exercise, Manual, and Modality Logs for complete treatment.     Assessment/Plan  Impairments: abnormal coordination, abnormal gait, abnormal muscle firing, abnormal or restricted ROM, activity intolerance, impaired balance, impaired  physical strength, lacks appropriate home exercise program and pain with function      Pt reported to physical therapy s/p R hip intertrochanteric nailing w/ intermedullary screw on 10/30/2023. Pt demonstrates improvement in their overall strength today. Measurements of LE power as well as gait speed were recorded today. Pt is at increased risk of falls at this time. Pt continues to have increased pain as well as persistent LE weakness. Pt would likely benefit from further discussion w/ ortho due to this weakness and pain. Pt's goals will be addressed at this time to reflect their current progress in therapy. Patient will continue to benefit from skilled physical therapy to further address their deficits in strength and hip stability in order to improve upon their functional mobility and to return to ADLs w/ greater ease.         Goals  Plan Goals: HIP PROBLEMS     1. The patient complains of R hip pain.              LTG 1: 12 weeks:  The patient will report a pain rating of 1/10 or better in order to improve  tolerance to activities of daily living and improve sleep quality.                          STATUS:  progressing              STG 1a: 6 weeks:  The patient will report a pain rating of 4/10 or better.                          STATUS:  progressing              TREATMENT:  Therapeutic exercises, manual therapy, aquatic therapy, home exercise   instruction, and modalities as needed for pain to include:  electrical stimulation, moist heat, ice,   ultrasound, and diathermy.     2. The patient demonstrates weakness of the R hip.              LTG 2: 12 weeks:  The patient will demonstrate 4+/5 strength for R hip flexion, abduction,  and extension in order to improve hip stability.                          STATUS:  progressing              STG 2a: 6 weeks:  The patient will demonstrate 4/5 strength for R hip flexion, abduction,  and extension.                          STATUS: partially met              TREATMENT:  Therapeutic exercises, manual therapy, aquatic therapy, home exercise instruction,  and modalities as needed for pain to include:  electrical stimulation, moist heat, ice, and ultrasound     3. The patient has gait dysfunction.  LTG 3: 12 weeks:  The patient will ambulate with straight cane, independently, for community distances with minimal limp to the R lower extremity in order to improve mobility and allow patient to perform activities such as shopping and work tasks with greater ease.  STATUS:  revised  STG 3a: The patient will be independent in [facility].  STATUS:  met  TREATMENT: Gait training, aquatic therapy, therapeutic exercise, and home exercise instruction.     4. Mobility: Walking/Moving Around Functional Limitation                   LTG 4: 12 weeks:  The patient will demonstrate 25% limitation by achieving a score of 60/80 on the Lower Extremity Functional Scale.  STATUS:  progressing  STG 4a: 6 weeks:  The patient will demonstrate 50% limitation by achieving a score of 40/80 on the Lower Extremity Functional Scale.    STATUS:  progressing  TREATMENT:  Manual therapy, therapeutic exercise, home exercise instruction, and modalities as needed to include: moist heat, electrical stimulation, and ultrasound.       Progress toward previous goals: Partially Met      Recommendations: Continue as planned  Timeframe: 2 a week, for 12 months   Prognosis to achieve goals: poor    PT Signature: Olaf Almazan PT, DPT    Electronically signed 4/22/2024    [place] License: PT - [id number]     Based upon review of the patient's progress and continued therapy plan, it is my medical opinion that Colton Chiang should continue physical therapy treatment at [facility]  [address]  [place] [id number] [phone].    Signature: __________________________________  Priscilla Sinha APRN    90 Day Recertification  Certification Period: 4/22/2024 thru 7/20/2024  I certify that the therapy  services are furnished while this patient is under my care.  The services outlined above are required by this patient, and will be reviewed every 90 days.     PHYSICIAN: Priscilla Sinha APRN  NPI: [id number]      DATE: 24     Please sign and return via fax to [phone]. Thank you, [facility].    Timed:  Manual Therapy:    0     mins  00602;  Therapeutic Exercise:    30     mins  50342;     Neuromuscular Leonel:    0    mins  27511;    Therapeutic Activity:     0     mins  92995;     Gait Trainin     mins  47980;     Aquatics                         0      mins  43863    Un-timed:  Mechanical Traction      0     mins  94133  Dry Needling     0     mins self-pay  Electrical Stimulation:    0     mins  79562 ( );    Timed Treatment:   30   mins   Total Treatment:     30   mins

## 2024-04-24 ENCOUNTER — TREATMENT (OUTPATIENT)
Dept: PHYSICAL THERAPY | Facility: CLINIC | Age: 64
End: 2024-04-24
Payer: MEDICARE

## 2024-04-24 DIAGNOSIS — Z98.890 S/P ORIF (OPEN REDUCTION INTERNAL FIXATION) FRACTURE: ICD-10-CM

## 2024-04-24 DIAGNOSIS — M25.551 ACUTE RIGHT HIP PAIN: Primary | ICD-10-CM

## 2024-04-24 DIAGNOSIS — R26.89 BALANCE DISORDER: ICD-10-CM

## 2024-04-24 DIAGNOSIS — Z87.81 S/P ORIF (OPEN REDUCTION INTERNAL FIXATION) FRACTURE: ICD-10-CM

## 2024-04-24 DIAGNOSIS — R26.9 GAIT DISTURBANCE: ICD-10-CM

## 2024-04-24 DIAGNOSIS — M62.81 MUSCLE WEAKNESS OF PROXIMAL EXTREMITY: ICD-10-CM

## 2024-04-24 PROCEDURE — 97530 THERAPEUTIC ACTIVITIES: CPT

## 2024-04-24 PROCEDURE — 97110 THERAPEUTIC EXERCISES: CPT

## 2024-04-24 NOTE — PROGRESS NOTES
Outpatient Physical Therapy  [address] [facility], [place] [address]                            Physical Therapy Daily Treatment Note    Patient: Colton Chiang   : 1960  Diagnosis/ICD-10 Code:  Acute right hip pain [M25.551]  Referring practitioner: PRIMO Schmitz  Date of Initial Visit: Type: THERAPY  Noted: 2024  Today's Date: 2024  Patient seen for 41 sessions           Subjective   Colton Chiang reports: soreness in his right hip and he was tired today.      Objective     See Exercise, Manual, and Modality Logs for complete treatment.     Assessment/Plan  Colton still experiencing soreness, some pain in right hip, depending on activity patient is doing.  Pt tolerated exercises well, with no increased pain or discomfort, general fatigue towards end of treatment session.. Pt would benefit from skilled PT to address Range of Motion  and Strength deficits, pain management and any concerns with ADLs.       Progress per Plan of Care         Timed:  Manual Therapy:         mins  51771;  Therapeutic Exercise:    15     mins  52185;     Neuromuscular Leonel:        mins  83642;    Therapeutic Activity:     20     mins  24317;     Gait Training:           mins  81746;        Untimed:  Electrical Stimulation:         mins  08582 ( );  Mechanical Traction:         mins  24108;       Timed Treatment:   35   mins   Total Treatment:     35   mins      Electronically signed:   Judy Mcfadden PTA Student     Natividad Pearce PTA  Physical Therapist Assistant  [facility] License #: [id number]

## 2024-04-25 ENCOUNTER — TREATMENT (OUTPATIENT)
Dept: PHYSICAL THERAPY | Facility: CLINIC | Age: 64
End: 2024-04-25
Payer: MEDICARE

## 2024-04-25 DIAGNOSIS — M25.551 ACUTE RIGHT HIP PAIN: Primary | ICD-10-CM

## 2024-04-25 DIAGNOSIS — Z87.81 S/P ORIF (OPEN REDUCTION INTERNAL FIXATION) FRACTURE: ICD-10-CM

## 2024-04-25 DIAGNOSIS — Z98.890 S/P ORIF (OPEN REDUCTION INTERNAL FIXATION) FRACTURE: ICD-10-CM

## 2024-04-25 DIAGNOSIS — R26.9 GAIT DISTURBANCE: ICD-10-CM

## 2024-04-25 DIAGNOSIS — R26.89 BALANCE DISORDER: ICD-10-CM

## 2024-04-25 DIAGNOSIS — M62.81 MUSCLE WEAKNESS OF PROXIMAL EXTREMITY: ICD-10-CM

## 2024-04-25 NOTE — PROGRESS NOTES
Physical Therapy Daily Treatment Note/Discharge Summary   [facility] [place] [facility]: [address] [place] [address]      Patient: Colton Chiang   : 1960  Diagnosis/ICD-10 Code:  Acute right hip pain [M25.551]  Referring practitioner: PRIMO Schmitz  Date of Initial Visit: Type: THERAPY  Noted: 2024  Today's Date: 2024  Patient seen for 42 sessions           Subjective   The patient reported they did not sleep well last night due to restless legs. Pt reports their pain is about the same as it has been.     Objective   See Exercise, Manual, and Modality Logs for complete treatment.     Assessment/Plan  Pt continues to work hard in therapy. Pt has been provided HEP at their last therapy session via PTA. Pt has reached a plateau in therapy at this time. Encouraged further follow up w/ ortho due to limited growth in therapy. Pt may benefit from physical therapy in the future if they have a regression in their current progress. Pt's goals will be addressed. Pt will be discharged.        Goals  Plan Goals: HIP PROBLEMS     1. The patient complains of R hip pain.              LTG 1: 12 weeks:  The patient will report a pain rating of 1/10 or better in order to improve  tolerance to activities of daily living and improve sleep quality.                          STATUS:  not met              STG 1a: 6 weeks:  The patient will report a pain rating of 4/10 or better.                          STATUS:  not met              TREATMENT:  Therapeutic exercises, manual therapy, aquatic therapy, home exercise   instruction, and modalities as needed for pain to include:  electrical stimulation, moist heat, ice,   ultrasound, and diathermy.     2. The patient demonstrates weakness of the R hip.              LTG 2: 12 weeks:  The patient will demonstrate 4+/5 strength for R hip flexion, abduction,  and extension in order to improve hip stability.                          STATUS:  not met              STG 2a: 6  weeks:  The patient will demonstrate 4/5 strength for R hip flexion, abduction,  and extension.                          STATUS: partially met              TREATMENT: Therapeutic exercises, manual therapy, aquatic therapy, home exercise instruction,  and modalities as needed for pain to include:  electrical stimulation, moist heat, ice, and ultrasound     3. The patient has gait dysfunction.  LTG 3: 12 weeks:  The patient will ambulate with straight cane, independently, for community distances with minimal limp to the R lower extremity in order to improve mobility and allow patient to perform activities such as shopping and work tasks with greater ease.  STATUS:  not met  STG 3a: The patient will be independent in [facility].  STATUS:  met  TREATMENT: Gait training, aquatic therapy, therapeutic exercise, and home exercise instruction.     4. Mobility: Walking/Moving Around Functional Limitation                   LTG 4: 12 weeks:  The patient will demonstrate 25% limitation by achieving a score of 60/80 on the Lower Extremity Functional Scale.  STATUS:  not met  STG 4a: 6 weeks:  The patient will demonstrate 50% limitation by achieving a score of 40/80 on the Lower Extremity Functional Scale.    STATUS:  not met  TREATMENT:  Manual therapy, therapeutic exercise, home exercise instruction, and modalities as needed to include: moist heat, electrical stimulation, and ultrasound.       Timed:  Manual Therapy:    0     mins  49833;  Therapeutic Exercise:    23     mins  00945;     Neuromuscular Leonel:   0    mins  53345;    Therapeutic Activity:     15     mins  39962;     Gait Trainin     mins  12023;     Aquatics                         0      mins  59607    Un-timed:  Mechanical Traction      0     mins  31596  Electrical Stimulation:    0     mins  98838 ( );      Timed Treatment:   38   mins   Total Treatment:     38   mins    Olaf Almazan PT, DPT    Electronically signed 2024    [place] License: PT -  [id number]

## 2024-05-17 ENCOUNTER — LAB (OUTPATIENT)
Dept: LAB | Facility: HOSPITAL | Age: 64
End: 2024-05-17
Payer: MEDICARE

## 2024-05-17 DIAGNOSIS — C61 PROSTATE CANCER: ICD-10-CM

## 2024-05-17 LAB — PSA SERPL-MCNC: 3.28 NG/ML (ref 0–4)

## 2024-05-17 PROCEDURE — 84153 ASSAY OF PSA TOTAL: CPT

## 2024-05-17 PROCEDURE — 36415 COLL VENOUS BLD VENIPUNCTURE: CPT

## 2024-05-18 NOTE — PROGRESS NOTES
Chief Complaint    Urologic complaint    Subjective          Colton Chiang presents to [facility]  History of Present Illness      64-year-old  gentleman       Prostatic adenocarcinoma    status post XRT in 2008 for Roxanne 8   ED          Patient still dealing with right hip pain and disability from hip fracture and hip surgery.    Walking with a cane.      Voiding without issue.  No straining.  Nocturia 3-4  No prostate meds.    No GH       on Lasix     Patient did do Lupron before when he did his original XRT      PVR    8/23    023    No cardiopulmonary history. He smokes 1.5 packs daily. Baby aspirin daily         Previous      History of CHF been well controlled for several years    Patient has had some lower extremity edema on the right, he has been ruled out for DVT      Has ED, it is worrisome.    Patient does have some hip pain.  He is getting hip MRI in the month      Patient has had no gross hematuria, dysuria or recurrent urinary tract infections. No history of kidney or bladder stone.     never had any urologic surgery.        10/22 CT abdomen/pelvis with - fractures of lateral right ninth 10th and 11th ribs.  Cholelithiasis        Prostate cancer history    5/24     3.2      1/24 discussed referral to tertiary center for discussion of further treatment or salvage prostatectomy.  Patient has declined risk and benefits understood  1/24     2.8       12/27/2023 PSMA - focal area of uptake right prostatic base concerning for malignancy recurrence.  Cervical, supraclavicular and thoracic adenopathy mild radiotracer uptake.  Unchanged since 10/22.  Close follow-up recommended.  Mild increased uptake left humeral head.  Likely avascular necrosis based on noncontrast CT findings.  Mild uptake T10 vertebral body.  Indeterminate.  Attention to follow-up.  Extensive groundglass and pulmonary opacification throughout the bilateral lungs.  Similar to 10/22.  Pulmonary referral  recommended.  12/23 discussed tertiary referral for possibility of salvage prostectomy.  Patient not interested, was not interested in second opinion.    10/23    3.17     6/23      2.2  5/22      1.8  12/21    1.9  9/20     1.2  2/17    0.51  8/16    0.38  2/16    0.46  7/15    0.35  1/15   0.41  7/14    0.24  1/12    0.30  7/10    0.5  7/09     0.7  12/08   0.2  2008 XRT prostate  10/07 prostate biopsyprostatic adenocarcinoma, 5+3    9/07 17.2          Past History:  Medical History: has a past medical history of Allergies, Arthritis, Broken bones, CHF (congestive heart failure) (1990), Chronic back pain, Deafness, bilateral, Depression, HBP (high blood pressure), Heart disease, High cholesterol, History of radiation therapy, Hypothyroid (02/10/2017), Prostate cancer (02/05/2016), 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   Surgical History: has a past surgical history that includes Colonoscopy; Prostate biopsy; and Hip Trochanteric Nailing (Right, 10/30/2023).   Family History:          Objective     Vital Signs:   There were no vitals taken for this visit.             Assessment and Plan    Diagnoses and all orders for this visit:    1. Prostate cancer (Primary)        PSA a little higher but still fairly stable.  We discussed this today    We will plan on starting ADT if his absolute number gets a little higher or he starts doubling in less than 1 year.  Patient voiced understanding    Still dealing with his overall health and at this time we will continue to hold on starting androgen deprivation.      After discussing again.  Patient wants to start androgen deprivation therapy only think this is appropriate.  We again discussed risk and benefits including hot flashes, decreased libido, ED, osteoporosis, fatigue.  Patient voiced understanding    Patient does understands that ADT will not keep this in remission forever.  Somewhere between 3 to 10 years.  Patient voiced understanding      Follow-up  in 4 months with PSA

## 2024-05-21 ENCOUNTER — OFFICE VISIT (OUTPATIENT)
Dept: ORTHOPEDIC SURGERY | Facility: CLINIC | Age: 64
End: 2024-05-21
Payer: MEDICARE

## 2024-05-21 ENCOUNTER — OFFICE VISIT (OUTPATIENT)
Dept: UROLOGY | Facility: CLINIC | Age: 64
End: 2024-05-21
Payer: MEDICARE

## 2024-05-21 VITALS
BODY MASS INDEX: 31.07 KG/M2 | WEIGHT: 217 LBS | OXYGEN SATURATION: 90 % | SYSTOLIC BLOOD PRESSURE: 130 MMHG | HEIGHT: 70 IN | HEART RATE: 73 BPM | DIASTOLIC BLOOD PRESSURE: 56 MMHG

## 2024-05-21 VITALS — WEIGHT: 227 LBS | HEIGHT: 70 IN | BODY MASS INDEX: 32.5 KG/M2

## 2024-05-21 DIAGNOSIS — C61 PROSTATE CANCER: Primary | ICD-10-CM

## 2024-05-21 DIAGNOSIS — M25.551 RIGHT HIP PAIN: Primary | ICD-10-CM

## 2024-05-21 DIAGNOSIS — S72.141A CLOSED INTERTROCHANTERIC FRACTURE OF HIP, RIGHT, INITIAL ENCOUNTER: ICD-10-CM

## 2024-05-21 RX ORDER — NABUMETONE 750 MG/1
750 TABLET, FILM COATED ORAL 2 TIMES DAILY
Qty: 60 TABLET | Refills: 1 | Status: SHIPPED | OUTPATIENT
Start: 2024-05-21

## 2024-05-21 NOTE — PROGRESS NOTES
"Chief Complaint  Follow-up of the Right Hip     Subjective      Colton Chiang presents to [facility] for follow up of the right hip. He had a right hip trochanteric nailing with intramedullary hip screw on 10/30/2023. He is ambulating with a cane for support and stability.  He has pain in his thigh and up through the lateral aspect of the hip.  He takes Meloxicam for pain relief.  The pain is worse on the lateral aspect of the hip.      Allergies   Allergen Reactions    Penicillin G Hives     Reaction as a kid    Penicillins Irritability        Social History     Socioeconomic History    Marital status:    Tobacco Use    Smoking status: Every Day     Current packs/day: 2.00     Average packs/day: 2.0 packs/day for 52.4 years (104.8 ttl pk-yrs)     Types: Cigarettes     Start date: 1/1/1972    Smokeless tobacco: Never   Vaping Use    Vaping status: Never Used   Substance and Sexual Activity    Alcohol use: Never    Drug use: Never    Sexual activity: Defer        I reviewed the patient's chief complaint, history of present illness, review of systems, past medical history, surgical history, family history, social history, medications, and allergy list.     Review of Systems     Constitutional: Denies fevers, chills, weight loss  Cardiovascular: Denies chest pain, shortness of breath  Skin: Denies rashes, acute skin changes  Neurologic: Denies headache, loss of consciousness        Vital Signs:   /56   Pulse 73   Ht 177.8 cm (70\")   Wt 98.4 kg (217 lb)   SpO2 90%   BMI 31.14 kg/m²          Physical Exam  General: Alert. No acute distress    Ortho Exam        RIGHT HIP  Sensation intact to all 5 nerves of the foot. Pain with straight leg raise. Leg lengths appear equal. Ambulates with Antalgic gait Negative Angel. Negative Hyacinth. Good strength to hamstrings, quadriceps, dorsiflexors, and plantar flexors. Knee Extensor Mechanism  intact        Procedures      Imaging Results " (Most Recent)       Procedure Component Value Units Date/Time    XR Hip With or Without Pelvis 2 - 3 View Right [[id number]] Resulted: 05/21/24 0858     Updated: 05/21/24 0909             Result Review :     X-Ray Report:  Right hip X-Ray  Indication: Evaluation of the right hip  AP/Lateral view(s)  Findings: Intact intramedullary hip nailing.  No signs of loosening, subsidence or periprosthetic fracture. Mild arthritis of the hip.   Prior studies available for comparison: yes             Assessment and Plan     Diagnoses and all orders for this visit:    1. Right hip pain (Primary)  -     XR Hip With or Without Pelvis 2 - 3 View Right    2. History of intertrochanteric fracture of hip, right, follow up        Discussed the treatment plan with the patient. I reviewed the X-rays that were obtained today with the patient.     Discussed anti inflammatory and different therapy approach.      Prescribed anti inflammatory and physical therapy.  Stop the Meloxicam.        Educated on risk of smoking/nicotine. Discussed options for smoking cessation. and Call or return if worsening symptoms.    Follow Up     4-6 weeks to assess ROM and pain.        Patient was given instructions and counseling regarding his condition or for health maintenance advice. Please see specific information pulled into the AVS if appropriate.     Scribed for Sandra Wolfe MD by Ngozi Vela MA.  05/21/24   09:05 EDT    I have personally performed the services described in this document as scribed by the above individual and it is both accurate and complete. Sandra Wolfe MD 05/21/24

## 2024-06-07 ENCOUNTER — LAB (OUTPATIENT)
Dept: LAB | Facility: HOSPITAL | Age: 64
End: 2024-06-07
Payer: MEDICARE

## 2024-06-07 DIAGNOSIS — Z00.00 ANNUAL PHYSICAL EXAM: Primary | ICD-10-CM

## 2024-06-07 DIAGNOSIS — D50.9 IRON DEFICIENCY ANEMIA, UNSPECIFIED IRON DEFICIENCY ANEMIA TYPE: ICD-10-CM

## 2024-06-07 DIAGNOSIS — E03.9 ACQUIRED HYPOTHYROIDISM: Chronic | ICD-10-CM

## 2024-06-07 DIAGNOSIS — E78.2 MIXED HYPERLIPIDEMIA: ICD-10-CM

## 2024-06-07 DIAGNOSIS — Z00.00 ANNUAL PHYSICAL EXAM: ICD-10-CM

## 2024-06-07 LAB
BACTERIA UR QL AUTO: NORMAL /HPF
BASOPHILS # BLD AUTO: 0.08 10*3/MM3 (ref 0–0.2)
BASOPHILS NFR BLD AUTO: 1.4 % (ref 0–1.5)
BILIRUB UR QL STRIP: NEGATIVE
CHOLEST SERPL-MCNC: 175 MG/DL (ref 0–200)
CLARITY UR: CLEAR
COLOR UR: YELLOW
DEPRECATED RDW RBC AUTO: 39 FL (ref 37–54)
EOSINOPHIL # BLD AUTO: 0.25 10*3/MM3 (ref 0–0.4)
EOSINOPHIL NFR BLD AUTO: 4.4 % (ref 0.3–6.2)
ERYTHROCYTE [DISTWIDTH] IN BLOOD BY AUTOMATED COUNT: 12.8 % (ref 12.3–15.4)
GLUCOSE UR STRIP-MCNC: NEGATIVE MG/DL
HCT VFR BLD AUTO: 41 % (ref 37.5–51)
HDLC SERPL-MCNC: 39 MG/DL (ref 40–60)
HGB BLD-MCNC: 13.3 G/DL (ref 13–17.7)
HGB UR QL STRIP.AUTO: NEGATIVE
HYALINE CASTS UR QL AUTO: NORMAL /LPF
IMM GRANULOCYTES # BLD AUTO: 0.02 10*3/MM3 (ref 0–0.05)
IMM GRANULOCYTES NFR BLD AUTO: 0.4 % (ref 0–0.5)
IRON 24H UR-MRATE: 44 MCG/DL (ref 59–158)
IRON SATN MFR SERPL: 14 % (ref 20–50)
KETONES UR QL STRIP: ABNORMAL
LDLC SERPL CALC-MCNC: 124 MG/DL (ref 0–100)
LDLC/HDLC SERPL: 3.17 {RATIO}
LEUKOCYTE ESTERASE UR QL STRIP.AUTO: ABNORMAL
LYMPHOCYTES # BLD AUTO: 1.31 10*3/MM3 (ref 0.7–3.1)
LYMPHOCYTES NFR BLD AUTO: 23 % (ref 19.6–45.3)
MCH RBC QN AUTO: 27.5 PG (ref 26.6–33)
MCHC RBC AUTO-ENTMCNC: 32.4 G/DL (ref 31.5–35.7)
MCV RBC AUTO: 84.9 FL (ref 79–97)
MONOCYTES # BLD AUTO: 0.46 10*3/MM3 (ref 0.1–0.9)
MONOCYTES NFR BLD AUTO: 8.1 % (ref 5–12)
NEUTROPHILS NFR BLD AUTO: 3.57 10*3/MM3 (ref 1.7–7)
NEUTROPHILS NFR BLD AUTO: 62.7 % (ref 42.7–76)
NITRITE UR QL STRIP: NEGATIVE
NRBC BLD AUTO-RTO: 0 /100 WBC (ref 0–0.2)
PH UR STRIP.AUTO: 7.5 [PH] (ref 5–8)
PLATELET # BLD AUTO: 248 10*3/MM3 (ref 140–450)
PMV BLD AUTO: 9.8 FL (ref 6–12)
PROT UR QL STRIP: ABNORMAL
RBC # BLD AUTO: 4.83 10*6/MM3 (ref 4.14–5.8)
RBC # UR STRIP: NORMAL /HPF
REF LAB TEST METHOD: NORMAL
SP GR UR STRIP: 1.03 (ref 1–1.03)
SQUAMOUS #/AREA URNS HPF: NORMAL /HPF
T4 FREE SERPL-MCNC: 1.6 NG/DL (ref 0.92–1.68)
TIBC SERPL-MCNC: 313 MCG/DL (ref 298–536)
TRANSFERRIN SERPL-MCNC: 210 MG/DL (ref 200–360)
TRIGL SERPL-MCNC: 62 MG/DL (ref 0–150)
TSH SERPL DL<=0.05 MIU/L-ACNC: 6.54 UIU/ML (ref 0.27–4.2)
UROBILINOGEN UR QL STRIP: ABNORMAL
VIT B12 BLD-MCNC: 470 PG/ML (ref 211–946)
VLDLC SERPL-MCNC: 12 MG/DL (ref 5–40)
WBC # UR STRIP: NORMAL /HPF
WBC NRBC COR # BLD AUTO: 5.69 10*3/MM3 (ref 3.4–10.8)

## 2024-06-07 PROCEDURE — 84443 ASSAY THYROID STIM HORMONE: CPT

## 2024-06-07 PROCEDURE — 81001 URINALYSIS AUTO W/SCOPE: CPT

## 2024-06-07 PROCEDURE — 82607 VITAMIN B-12: CPT

## 2024-06-07 PROCEDURE — 84439 ASSAY OF FREE THYROXINE: CPT

## 2024-06-07 PROCEDURE — 36415 COLL VENOUS BLD VENIPUNCTURE: CPT

## 2024-06-07 PROCEDURE — 85025 COMPLETE CBC W/AUTO DIFF WBC: CPT

## 2024-06-07 PROCEDURE — 80061 LIPID PANEL: CPT

## 2024-06-07 PROCEDURE — 83540 ASSAY OF IRON: CPT

## 2024-06-07 PROCEDURE — 84466 ASSAY OF TRANSFERRIN: CPT

## 2024-06-11 ENCOUNTER — OFFICE VISIT (OUTPATIENT)
Dept: FAMILY MEDICINE CLINIC | Facility: CLINIC | Age: 64
End: 2024-06-11
Payer: MEDICARE

## 2024-06-11 ENCOUNTER — PATIENT ROUNDING (BHMG ONLY) (OUTPATIENT)
Dept: FAMILY MEDICINE CLINIC | Facility: CLINIC | Age: 64
End: 2024-06-11
Payer: MEDICARE

## 2024-06-11 VITALS
HEIGHT: 70 IN | OXYGEN SATURATION: 97 % | SYSTOLIC BLOOD PRESSURE: 125 MMHG | WEIGHT: 230.8 LBS | TEMPERATURE: 98 F | RESPIRATION RATE: 8 BRPM | HEART RATE: 70 BPM | BODY MASS INDEX: 33.04 KG/M2 | DIASTOLIC BLOOD PRESSURE: 64 MMHG

## 2024-06-11 DIAGNOSIS — Z12.11 COLON CANCER SCREENING: ICD-10-CM

## 2024-06-11 DIAGNOSIS — F17.219 CIGARETTE NICOTINE DEPENDENCE WITH NICOTINE-INDUCED DISORDER: Chronic | ICD-10-CM

## 2024-06-11 DIAGNOSIS — Z00.00 ANNUAL PHYSICAL EXAM: ICD-10-CM

## 2024-06-11 DIAGNOSIS — Z00.00 MEDICARE ANNUAL WELLNESS VISIT, SUBSEQUENT: Primary | ICD-10-CM

## 2024-06-11 DIAGNOSIS — E03.9 ACQUIRED HYPOTHYROIDISM: Chronic | ICD-10-CM

## 2024-06-11 DIAGNOSIS — I10 ESSENTIAL HYPERTENSION: ICD-10-CM

## 2024-06-11 DIAGNOSIS — I50.32 CHRONIC DIASTOLIC (CONGESTIVE) HEART FAILURE: Chronic | ICD-10-CM

## 2024-06-11 DIAGNOSIS — E66.09 CLASS 1 OBESITY DUE TO EXCESS CALORIES WITH SERIOUS COMORBIDITY AND BODY MASS INDEX (BMI) OF 33.0 TO 33.9 IN ADULT: Chronic | ICD-10-CM

## 2024-06-11 DIAGNOSIS — D50.9 IRON DEFICIENCY ANEMIA, UNSPECIFIED IRON DEFICIENCY ANEMIA TYPE: ICD-10-CM

## 2024-06-11 DIAGNOSIS — E78.5 HYPERLIPIDEMIA LDL GOAL <100: Chronic | ICD-10-CM

## 2024-06-11 PROCEDURE — 3078F DIAST BP <80 MM HG: CPT | Performed by: FAMILY MEDICINE

## 2024-06-11 PROCEDURE — 1160F RVW MEDS BY RX/DR IN RCRD: CPT | Performed by: FAMILY MEDICINE

## 2024-06-11 PROCEDURE — 99214 OFFICE O/P EST MOD 30 MIN: CPT | Performed by: FAMILY MEDICINE

## 2024-06-11 PROCEDURE — 3074F SYST BP LT 130 MM HG: CPT | Performed by: FAMILY MEDICINE

## 2024-06-11 PROCEDURE — 1170F FXNL STATUS ASSESSED: CPT | Performed by: FAMILY MEDICINE

## 2024-06-11 PROCEDURE — 1159F MED LIST DOCD IN RCRD: CPT | Performed by: FAMILY MEDICINE

## 2024-06-11 PROCEDURE — G0439 PPPS, SUBSEQ VISIT: HCPCS | Performed by: FAMILY MEDICINE

## 2024-06-11 RX ORDER — METOPROLOL SUCCINATE 25 MG/1
TABLET, EXTENDED RELEASE ORAL
Qty: 45 TABLET | Refills: 3 | Status: SHIPPED | OUTPATIENT
Start: 2024-06-11

## 2024-06-11 RX ORDER — FUROSEMIDE 40 MG/1
40 TABLET ORAL DAILY
Qty: 90 TABLET | Refills: 3 | Status: SHIPPED | OUTPATIENT
Start: 2024-06-11

## 2024-06-11 NOTE — ASSESSMENT & PLAN NOTE
Patient's (Body mass index is 33.12 kg/m².) indicates that they are obese (BMI >30) with health conditions that include hypertension and dyslipidemias . Weight is worsening. BMI  is above average; BMI management plan is completed. We discussed low calorie, low carb based diet program, portion control, and increasing exercise.

## 2024-06-11 NOTE — PROGRESS NOTES
The ABCs of the Annual Wellness Visit  Subsequent Medicare Wellness Visit    Subjective    Colton Chiang is a 64 y.o. male who presents for a Subsequent Medicare Wellness Visit.    The following portions of the patient's history were reviewed and   updated as appropriate: allergies, current medications, past family history, past medical history, past social history, past surgical history, and problem list.    Compared to one year ago, the patient feels his physical   health is the same.    Compared to one year ago, the patient feels his mental   health is the same.    Recent Hospitalizations:  This patient has had a [facility] admission record on file within the last 365 days.    Current Medical Providers:  Patient Care Team:  Eduarda Butt DO as PCP - General (Family Medicine)  Travis Joel MD as Consulting Physician (Urology)    Outpatient Medications Prior to Visit   Medication Sig Dispense Refill    Ascorbic Acid (Vitamin C) 500 MG capsule Take 1 capsule by mouth Daily.      aspirin 81 MG EC tablet Take 1 tablet by mouth Daily.      Coenzyme Q10 (Co Q-10) 300 MG capsule Take 1 capsule by mouth Every Other Day. (Patient taking differently: Take 1 capsule by mouth Daily.) 30 each 5    levothyroxine (Synthroid) 137 MCG tablet TAKE 2 TABLETS IN THE      MORNING 30 MINUTES BEFORE  BREAKFAST (INCREASED DOSE) 180 tablet 1    liothyronine (CYTOMEL) 5 MCG tablet TAKE 2 TABLETS DAILY 180 tablet 0    Magnesium 250 MG tablet Take 1 tablet by mouth Daily.      multivitamin with minerals tablet tablet Take 1 tablet by mouth Daily.      nabumetone (RELAFEN) 750 MG tablet Take 1 tablet by mouth 2 (Two) Times a Day. 60 tablet 1    Zinc 50 MG capsule Take 50 mg by mouth Daily.      furosemide (LASIX) 40 MG tablet Take 1 tablet by mouth Daily. 90 tablet 3    metoprolol succinate XL (TOPROL-XL) 25 MG 24 hr tablet Take 1/2 tab po qd (Patient taking differently: Take 0.5 tablets by mouth Daily. Take 1/2 tab po qd) 45  tablet 3    HYDROcodone-acetaminophen (NORCO) 5-325 MG per tablet Take 1 tablet by mouth Every 6 (Six) Hours As Needed (as needed for pain). (Patient not taking: Reported on 5/21/2024) 28 tablet 0     No facility-administered medications prior to visit.       No opioid medication identified on active medication list. I have reviewed chart for other potential  high risk medication/s and harmful drug interactions in the elderly.        Aspirin is on active medication list. Aspirin use is indicated based on review of current medical condition/s. Pros and cons of this therapy have been discussed today. Benefits of this medication outweigh potential harm.  Patient has been encouraged to continue taking this medication.  .      Patient Active Problem List   Diagnosis    Essential hypertension    ED (erectile dysfunction)    History of CVA (cerebrovascular accident)    Class 1 obesity due to excess calories with serious comorbidity and body mass index (BMI) of 33.0 to 33.9 in adult    Primary osteoarthritis involving multiple joints    Hyperlipidemia LDL goal <100    Seasonal allergies    Acquired hypothyroidism    History of prostate cancer    Cigarette nicotine dependence with nicotine-induced disorder    Chronic diastolic (congestive) heart failure    Iron deficiency anemia    Medicare annual wellness visit, subsequent    RLS (restless legs syndrome)    Erysipelas    Prostate cancer    Fall    Closed intertrochanteric fracture of hip, right, initial encounter    Lower extremity edema    Right leg swelling     Advance Care Planning   Advance Care Planning     Advance Directive is not on file.  ACP discussion was held with the patient during this visit. Patient has an advance directive (not in EMR), copy requested.     Objective    Vitals:    06/11/24 0842   BP: 125/64   BP Location: Left arm   Patient Position: Sitting   Cuff Size: Adult   Pulse: 70   Resp: 8   Temp: 98 °F (36.7 °C)   TempSrc: Tympanic   SpO2: 97%  "  Weight: 105 kg (230 lb 12.8 oz)   Height: 177.8 cm (70\")   PainSc:   8   PainLoc: Knee     Estimated body mass index is 33.12 kg/m² as calculated from the following:    Height as of this encounter: 177.8 cm (70\").    Weight as of this encounter: 105 kg (230 lb 12.8 oz).           Does the patient have evidence of cognitive impairment? No    Lab Results   Component Value Date    TRIG 62 2024    HDL 39 (L) 2024     (H) 2024    VLDL 12 2024        HEALTH RISK ASSESSMENT    Smoking Status:  Social History     Tobacco Use   Smoking Status Every Day    Current packs/day: 2.00    Average packs/day: 2.0 packs/day for 52.4 years (104.9 ttl pk-yrs)    Types: Cigarettes    Start date: 1972   Smokeless Tobacco Never     Alcohol Consumption:  Social History     Substance and Sexual Activity   Alcohol Use Never     Fall Risk Screen:    CHELSIE Fall Risk Assessment was completed, and patient is at LOW risk for falls.Assessment completed on:2024    Depression Screenin/11/2024     8:00 AM   PHQ-2/PHQ-9 Depression Screening   Little Interest or Pleasure in Doing Things 2-->more than half the days   Feeling Down, Depressed or Hopeless 2-->more than half the days   Trouble Falling or Staying Asleep, or Sleeping Too Much 1-->several days   Feeling Tired or Having Little Energy 3-->nearly every day   Poor Appetite or Overeating 0-->not at all   Feeling Bad about Yourself - or that You are a Failure or Have Let Yourself or Your Family Down 0-->not at all   Trouble Concentrating on Things, Such as Reading the Newspaper or Watching Television 0-->not at all   Moving or Speaking So Slowly that Other People Could Have Noticed? Or the Opposite - Being So Fidgety 0-->not at all   Thoughts that You Would be Better Off Dead or of Hurting Yourself in Some Way 0-->not at all   PHQ-9: Brief Depression Severity Measure Score 8   If You Checked Off Any Problems, How Difficult Have These Problems Made " It For You to Do Your Work, Take Care of Things at Home, or Get Along with Other People? somewhat difficult       Health Habits and Functional and Cognitive Screenin/11/2024     8:00 AM   Functional & Cognitive Status   Do you have difficulty preparing food and eating? No   Do you have difficulty bathing yourself, getting dressed or grooming yourself? No   Do you have difficulty using the toilet? No   Do you have difficulty moving around from place to place? No   Do you have trouble with steps or getting out of a bed or a chair? No   Current Diet Well Balanced Diet   Dental Exam Not up to date   Eye Exam Not up to date   Exercise (times per week) 3 times per week   Current Exercises Include Yard Work;Walking        Exercise Comment physical therpy   Do you need help using the phone?  No   Are you deaf or do you have serious difficulty hearing?  No   Do you need help to go to places out of walking distance? Yes   Do you need help shopping? No   Do you need help preparing meals?  No   Do you need help with housework?  No   Do you need help with laundry? No   Do you need help taking your medications? No   Do you need help managing money? No   Do you ever drive or ride in a car without wearing a seat belt? Yes   Have you felt unusual stress, anger or loneliness in the last month? No   Who do you live with? Spouse   If you need help, do you have trouble finding someone available to you? No   Have you been bothered in the last four weeks by sexual problems? No   Do you have difficulty concentrating, remembering or making decisions? No       Age-appropriate Screening Schedule:  Refer to the list below for future screening recommendations based on patient's age, sex and/or medical conditions. Orders for these recommended tests are listed in the plan section. The patient has been provided with a written plan.    Health Maintenance   Topic Date Due    Pneumococcal Vaccine 65+ (1 of 2 - PCV) Never done    ZOSTER  VACCINE (1 of 2) Never done    RSV Vaccine - Adults (1 - 1-dose 60+ series) Never done    COVID-19 Vaccine (1 - 2023-24 season) Never done    COLORECTAL CANCER SCREENING  01/11/2024    LUNG CANCER SCREENING  01/25/2025 (Originally 10/24/2023)    INFLUENZA VACCINE  08/01/2024    LIPID PANEL  06/07/2025    ANNUAL WELLNESS VISIT  06/11/2025    BMI FOLLOWUP  06/11/2025    TDAP/TD VACCINES (2 - Td or Tdap) 10/24/2032    HEPATITIS C SCREENING  Completed    AAA SCREEN (ONE-TIME)  Completed                  CMS Preventative Services Quick Reference  Risk Factors Identified During Encounter  Fall Risk-High or Moderate: Discussed Fall Prevention in the home  The above risks/problems have been discussed with the patient.  Pertinent information has been shared with the patient in the After Visit Summary.  An After Visit Summary and PPPS were made available to the patient.    Follow Up:   Next Medicare Wellness visit to be scheduled in 1 year.       Additional E&M Note during same encounter follows:  Patient has multiple medical problems which are significant and separately identifiable that require additional work above and beyond the Medicare Wellness Visit.      Chief Complaint  Medicare Wellness-subsequent and Hypertension    Subjective        HPI  Colton Chiang is also being seen today for HTN, obesity, HLD, nicotine dependence, iron deficiency anemia, prostate cancer.     Review of Systems   HENT:  Negative for trouble swallowing.    Eyes:  Negative for visual disturbance.   Respiratory:  Negative for apnea.    Cardiovascular:  Negative for chest pain.   Gastrointestinal:  Negative for blood in stool.   Endocrine: Negative for polyphagia.   Genitourinary:  Negative for dysuria.   Musculoskeletal:  Positive for arthralgias, gait problem and joint swelling.   Skin:  Negative for color change.   Allergic/Immunologic: Negative for immunocompromised state.   Neurological:  Negative for seizures.   Hematological:  Negative for  "adenopathy.   Psychiatric/Behavioral:  Negative for behavioral problems.        Objective   Vital Signs:  /64 (BP Location: Left arm, Patient Position: Sitting, Cuff Size: Adult)   Pulse 70   Temp 98 °F (36.7 °C) (Tympanic)   Resp 8   Ht 177.8 cm (70\")   Wt 105 kg (230 lb 12.8 oz)   SpO2 97%   BMI 33.12 kg/m²     Physical Exam  Vitals reviewed.   Constitutional:       Appearance: He is well-developed. He is obese.   HENT:      Head: Normocephalic and atraumatic.      Right Ear: External ear normal.      Left Ear: External ear normal.      Mouth/Throat:      Pharynx: No oropharyngeal exudate.   Eyes:      Conjunctiva/sclera: Conjunctivae normal.      Pupils: Pupils are equal, round, and reactive to light.   Neck:      Vascular: No carotid bruit.   Cardiovascular:      Rate and Rhythm: Normal rate and regular rhythm.      Heart sounds: No murmur heard.     No friction rub. No gallop.   Pulmonary:      Effort: Pulmonary effort is normal.      Breath sounds: Normal breath sounds. No wheezing or rhonchi.   Abdominal:      General: There is no distension.   Skin:     General: Skin is warm and dry.   Neurological:      Mental Status: He is alert and oriented to person, place, and time.      Cranial Nerves: No cranial nerve deficit.      Motor: No weakness.   Psychiatric:         Mood and Affect: Mood and affect normal.         Behavior: Behavior normal.         Thought Content: Thought content normal.         Judgment: Judgment normal.            CMP          10/31/2023    04:01 11/1/2023    04:05 2/15/2024    08:24   CMP   Glucose 165  99  91    BUN 9  11  16    Creatinine 0.72  0.75  0.79    EGFR 101.4  100.1  98.0    Sodium 133  136  138    Potassium 4.5  4.2  4.7    Chloride 98  98  100    Calcium 8.7  8.5  9.1    Total Protein   7.0    Albumin   4.0    Total Bilirubin   0.2    Alkaline Phosphatase   93    AST (SGOT)   33    ALT (SGPT)   34    BUN/Creatinine Ratio 12.5  14.7  20.3    Anion Gap 6.3  7.2  " 8.7      CBC          10/31/2023    04:01 11/1/2023    04:05 6/7/2024    09:41   CBC   WBC  8.58  5.69    RBC  4.20  4.83    Hemoglobin 11.0  11.5  13.3    Hematocrit 35.5  36.2  41.0    MCV  86.2  84.9    MCH  27.4  27.5    MCHC  31.8  32.4    RDW  13.5  12.8    Platelets  220  248      Lipid Panel          6/23/2023    08:06 2/15/2024    08:24 6/7/2024    09:45   Lipid Panel   Total Cholesterol 142  163  175    Triglycerides 39  57  62    HDL Cholesterol 42  38  39    VLDL Cholesterol 9  11  12    LDL Cholesterol  91  114  124    LDL/HDL Ratio 2.20  2.99  3.17      TSH          6/23/2023    08:06 10/29/2023    21:39 6/7/2024    09:41   TSH   TSH 4.000  2.530  6.540                 Assessment and Plan   Diagnoses and all orders for this visit:    1. Medicare annual wellness visit, subsequent (Primary)    2. Essential hypertension  Assessment & Plan:  Hypertension is stable and controlled  Continue current treatment regimen.  Dietary sodium restriction.  Weight loss.  Blood pressure will be reassessed in 6 months.    Orders:  -     metoprolol succinate XL (TOPROL-XL) 25 MG 24 hr tablet; Take 1/2 tab po qd  Dispense: 45 tablet; Refill: 3    3. Class 1 obesity due to excess calories with serious comorbidity and body mass index (BMI) of 33.0 to 33.9 in adult  Assessment & Plan:  Patient's (Body mass index is 33.12 kg/m².) indicates that they are obese (BMI >30) with health conditions that include hypertension and dyslipidemias . Weight is worsening. BMI  is above average; BMI management plan is completed. We discussed low calorie, low carb based diet program, portion control, and increasing exercise.       4. Iron deficiency anemia, unspecified iron deficiency anemia type  -     Iron and TIBC; Future  -     Ferritin; Future  -     Vitamin B12; Future  -     Folate; Future    5. Acquired hypothyroidism  -     TSH+Free T4; Future    6. Colon cancer screening  -     Cologuard - Stool, Per Rectum; Future    7. Chronic  diastolic (congestive) heart failure  -     furosemide (LASIX) 40 MG tablet; Take 1 tablet by mouth Daily.  Dispense: 90 tablet; Refill: 3    8. Annual physical exam  -     Urinalysis With Microscopic - Urine, Clean Catch; Future  -     CBC w AUTO Differential; Future  -     Comprehensive metabolic panel; Future    9. Hyperlipidemia LDL goal <100  Assessment & Plan:   Lipid abnormalities are stable    Plan:  Continue same medication/s without change.      Discussed medication dosage, use, side effects, and goals of treatment in detail.    Counseled patient on lifestyle modifications to help control hyperlipidemia.   Cholesterol lowering dietary information shared with patient.    Patient Treatment Goals:   LDL goal is under 100    Followup in 6 months.    Orders:  -     Lipid panel; Future    10. Cigarette nicotine dependence with nicotine-induced disorder  Assessment & Plan:  Tobacco use is unchanged.  Smoking cessation counseling was provided.  Tobacco use will be reassessed in 6 months.                                                     Follow Up   Return in about 6 months (around 12/11/2024).  Patient was given instructions and counseling regarding his condition or for health maintenance advice. Please see specific information pulled into the AVS if appropriate.

## 2024-06-11 NOTE — ASSESSMENT & PLAN NOTE
Lipid abnormalities are stable    Plan:  Continue same medication/s without change.      Discussed medication dosage, use, side effects, and goals of treatment in detail.    Counseled patient on lifestyle modifications to help control hyperlipidemia.   Cholesterol lowering dietary information shared with patient.    Patient Treatment Goals:   LDL goal is under 100    Followup in 6 months.

## 2024-06-13 DIAGNOSIS — D50.9 IRON DEFICIENCY ANEMIA, UNSPECIFIED IRON DEFICIENCY ANEMIA TYPE: Primary | ICD-10-CM

## 2024-06-13 DIAGNOSIS — E03.9 ACQUIRED HYPOTHYROIDISM: Chronic | ICD-10-CM

## 2024-06-13 RX ORDER — FERRIC MALTOL 30 MG/1
1 CAPSULE ORAL 2 TIMES DAILY
Qty: 60 CAPSULE | Refills: 5 | Status: SHIPPED | OUTPATIENT
Start: 2024-06-13 | End: 2024-06-14 | Stop reason: SDUPTHER

## 2024-06-14 RX ORDER — FERRIC MALTOL 30 MG/1
1 CAPSULE ORAL 2 TIMES DAILY
Qty: 60 CAPSULE | Refills: 5 | Status: SHIPPED | OUTPATIENT
Start: 2024-06-14

## 2024-06-18 RX ORDER — FERRIC MALTOL 30 MG/1
1 CAPSULE ORAL 2 TIMES DAILY
Qty: 60 CAPSULE | Refills: 5 | Status: SHIPPED | OUTPATIENT
Start: 2024-06-18

## 2024-06-18 NOTE — TELEPHONE ENCOUNTER
Caller: DIONI HAWKINS    Relationship: Emergency Contact    Best call back number: [phone]    Which medication are you concerned about:   Ferric Maltol (ACCRUFeR) 30 MG capsule     Who prescribed you this medication: CINDI    What are your concerns:   PATIENT IS NEEDING THIS MEDICATION SENT TO [email or web address] DUE TO THE PRICING.

## 2024-07-08 RX ORDER — LIOTHYRONINE SODIUM 5 UG/1
10 TABLET ORAL DAILY
Qty: 180 TABLET | Refills: 0 | Status: SHIPPED | OUTPATIENT
Start: 2024-07-08

## 2024-07-23 ENCOUNTER — OFFICE VISIT (OUTPATIENT)
Dept: ORTHOPEDIC SURGERY | Facility: CLINIC | Age: 64
End: 2024-07-23
Payer: MEDICARE

## 2024-07-23 VITALS
OXYGEN SATURATION: 93 % | HEIGHT: 70 IN | DIASTOLIC BLOOD PRESSURE: 73 MMHG | HEART RATE: 71 BPM | WEIGHT: 230 LBS | BODY MASS INDEX: 32.93 KG/M2 | SYSTOLIC BLOOD PRESSURE: 115 MMHG

## 2024-07-23 DIAGNOSIS — M54.50 RIGHT LOW BACK PAIN, UNSPECIFIED CHRONICITY, UNSPECIFIED WHETHER SCIATICA PRESENT: ICD-10-CM

## 2024-07-23 DIAGNOSIS — M25.551 RIGHT HIP PAIN: Primary | ICD-10-CM

## 2024-07-23 DIAGNOSIS — S72.141A CLOSED INTERTROCHANTERIC FRACTURE OF HIP, RIGHT, INITIAL ENCOUNTER: ICD-10-CM

## 2024-07-23 PROCEDURE — 99213 OFFICE O/P EST LOW 20 MIN: CPT | Performed by: ORTHOPAEDIC SURGERY

## 2024-07-23 PROCEDURE — 3078F DIAST BP <80 MM HG: CPT | Performed by: ORTHOPAEDIC SURGERY

## 2024-07-23 PROCEDURE — 3074F SYST BP LT 130 MM HG: CPT | Performed by: ORTHOPAEDIC SURGERY

## 2024-07-23 NOTE — PROGRESS NOTES
"Chief Complaint  Follow-up and Pain of the Right Hip     Subjective      Colton Chiang presents to [facility] for follow up of the right hip.  He is in physical therapy.  He is still having difficulty with weight bearing of the right hip and has antalgic gait with the cane.  He had a right hip trochanteric nailing with intramedullary hip screw on 10/30/2023. He is ambulating with a cane for support and stability.  He has pain in his thigh and up through the lateral aspect of the hip.  He takes Meloxicam for pain relief.  The pain is worse on the lateral aspect of the hip.  When he sits down it feels like he is sitting a a pin and it shifts.  He has pain on his upper lateral thigh.  He feels like the leg is \"dead\" at times. He has some groin pain after therapy.      Allergies   Allergen Reactions    Penicillin G Hives     Reaction as a kid    Penicillins Irritability        Social History     Socioeconomic History    Marital status:    Tobacco Use    Smoking status: Every Day     Current packs/day: 2.00     Average packs/day: 2.0 packs/day for 52.6 years (105.1 ttl pk-yrs)     Types: Cigarettes     Start date: 1/1/1972    Smokeless tobacco: Never   Vaping Use    Vaping status: Never Used   Substance and Sexual Activity    Alcohol use: Never    Drug use: Never    Sexual activity: Defer        I reviewed the patient's chief complaint, history of present illness, review of systems, past medical history, surgical history, family history, social history, medications, and allergy list.     Review of Systems     Constitutional: Denies fevers, chills, weight loss  Cardiovascular: Denies chest pain, shortness of breath  Skin: Denies rashes, acute skin changes  Neurologic: Denies headache, loss of consciousness      Vital Signs:   /73   Pulse 71   Ht 177.8 cm (70\")   Wt 104 kg (230 lb)   SpO2 93%   BMI 33.00 kg/m²          Physical Exam  General: Alert. No acute distress    Ortho " Exam        RIGHT HIP Decrease ROM of the right hip.  No skin discoloration, atrophy or swelling. Positive EHL, FHL, GS, and TA. Sensation intact to all 5 nerves of the foot. Pain with straight leg raise. Leg lengths appear equal. Ambulates with Antalgic gait Negative Angel. Negative Hyacinth. Good strength to hamstrings, quadriceps, dorsiflexors, and plantar flexors. Knee Extensor Mechanism  intact        Procedures      Imaging Results (Most Recent)       Procedure Component Value Units Date/Time    XR Hip With or Without Pelvis 2 - 3 View Right [[id number]] Resulted: 07/23/24 0828     Updated: 07/23/24 0831             Result Review :     X-Ray Report:  Right hip X-Ray  Indication: Evaluation of the right hip  AP/Lateral view(s)  Findings: Intact intertrochanteric hip nailing.  No signs of loosening, subsidence or periprosthetic fracture.   Prior studies available for comparison: yes        Assessment and Plan     Diagnoses and all orders for this visit:    1. Right hip pain (Primary)  -     XR Hip With or Without Pelvis 2 - 3 View Right    2. History of intertrochanteric fracture of hip, right, follow up    3. Right low back pain, unspecified chronicity, unspecified whether sciatica present        Discussed the treatment plan with the patient. I reviewed the X-rays that were obtained today with the patient.     Continue physical therapy.     MRI of the low back and CT of the right hip.      Educated on risk of smoking/nicotine. Discussed options for smoking cessation. and Call or return if worsening symptoms.    Follow Up     After MRI and the CT of the right hip.       Patient was given instructions and counseling regarding his condition or for health maintenance advice. Please see specific information pulled into the AVS if appropriate.     Scribed for Sandra Wolfe MD by Ngozi Vela MA.  07/23/24   08:37 EDT    I have personally performed the services described in this document as scribed by the above  individual and it is both accurate and complete. Sandra Wolfe MD 07/23/24

## 2024-08-30 ENCOUNTER — HOSPITAL ENCOUNTER (OUTPATIENT)
Dept: CT IMAGING | Facility: HOSPITAL | Age: 64
Discharge: HOME | End: 2024-08-30
Payer: MEDICARE

## 2024-08-30 ENCOUNTER — HOSPITAL ENCOUNTER (OUTPATIENT)
Dept: MRI IMAGING | Facility: HOSPITAL | Age: 64
Discharge: HOME | End: 2024-08-30
Payer: MEDICARE

## 2024-08-30 DIAGNOSIS — S72.141A CLOSED INTERTROCHANTERIC FRACTURE OF HIP, RIGHT, INITIAL ENCOUNTER: ICD-10-CM

## 2024-08-30 DIAGNOSIS — M25.551 RIGHT HIP PAIN: ICD-10-CM

## 2024-08-30 DIAGNOSIS — M54.50 RIGHT LOW BACK PAIN, UNSPECIFIED CHRONICITY, UNSPECIFIED WHETHER SCIATICA PRESENT: ICD-10-CM

## 2024-08-30 PROCEDURE — 72148 MRI LUMBAR SPINE W/O DYE: CPT

## 2024-08-30 PROCEDURE — 73700 CT LOWER EXTREMITY W/O DYE: CPT

## 2024-09-05 ENCOUNTER — OFFICE VISIT (OUTPATIENT)
Dept: ORTHOPEDIC SURGERY | Facility: CLINIC | Age: 64
End: 2024-09-05
Payer: MEDICARE

## 2024-09-05 VITALS
OXYGEN SATURATION: 90 % | SYSTOLIC BLOOD PRESSURE: 114 MMHG | DIASTOLIC BLOOD PRESSURE: 68 MMHG | HEART RATE: 88 BPM | BODY MASS INDEX: 32.93 KG/M2 | HEIGHT: 70 IN | WEIGHT: 230 LBS

## 2024-09-05 DIAGNOSIS — S72.141A CLOSED INTERTROCHANTERIC FRACTURE OF HIP, RIGHT, INITIAL ENCOUNTER: ICD-10-CM

## 2024-09-05 DIAGNOSIS — M43.16 SPONDYLOLISTHESIS OF LUMBAR REGION: Primary | ICD-10-CM

## 2024-09-05 DIAGNOSIS — S72.141K CLOSED DISPLACED INTERTROCHANTERIC FRACTURE OF RIGHT FEMUR WITH NONUNION: Primary | ICD-10-CM

## 2024-09-05 NOTE — PROGRESS NOTES
Chief Complaint  Follow-up of the Right Hip     Subjective      Colton Chiang presents to [facility]  for follow up of the right hip. . Patient was at [facility] 10/29/2023 when the fall happened.  He slipped over something and fell on his right side.  He is in physical therapy.  He is still having difficulty with weight bearing of the right hip and has antalgic gait with the cane.  He had a right hip trochanteric nailing with intramedullary hip screw on 10/30/2023. He is ambulating with a cane for support and stability.  He has pain in his thigh and up through the lateral aspect of the hip.  He takes Meloxicam for pain relief. He had a MRI of his lumbar spine and is here to review.  He is in physical therapy at [facility] in [place].  He has a lot of pain in the mid thigh and occasionally in the groin and a lot of pain in the low back also. He had a CT scan of the right hip and MRI of the lumbar spine. The CT showed persistent non union.      Allergies   Allergen Reactions    Penicillin G Hives     Reaction as a kid    Penicillins Irritability        Social History     Socioeconomic History    Marital status:    Tobacco Use    Smoking status: Every Day     Current packs/day: 2.00     Average packs/day: 2.0 packs/day for 52.7 years (105.4 ttl pk-yrs)     Types: Cigarettes     Start date: 1/1/1972    Smokeless tobacco: Never   Vaping Use    Vaping status: Never Used   Substance and Sexual Activity    Alcohol use: Never    Drug use: Never    Sexual activity: Defer        I reviewed the patient's chief complaint, history of present illness, review of systems, past medical history, surgical history, family history, social history, medications, and allergy list.     Review of Systems     Constitutional: Denies fevers, chills, weight loss  Cardiovascular: Denies chest pain, shortness of breath  Skin: Denies rashes, acute skin changes  Neurologic: Denies headache, loss of  "consciousness        Vital Signs:   /68   Pulse 88   Ht 177.8 cm (70\")   Wt 104 kg (230 lb)   SpO2 90%   BMI 33.00 kg/m²          Physical Exam  General: Alert. No acute distress    Ortho Exam        RIGHT HIP Decrease ROM of the right hip.  No skin discoloration, atrophy or swelling. Positive EHL, FHL, GS, and TA. Sensation intact to all 5 nerves of the foot. Pain with straight leg raise. Ambulates with Antalgic gait Negative Angel. Negative Hyacinth. The CT showed persistent non union since 10/29/23.        Procedures      Imaging Results (Most Recent)       None             Result Review :         CT hip right wo contrast    Result Date: 9/3/2024  Narrative: CT HIP RIGHT WO CONTRAST Date of Exam: 8/30/2024 7:47 AM EDT Indication: right hip pain. Comparison: 7/23/2024 radiographs Technique: Axial CT images were obtained of the right hip without contrast administration.  Reconstructed coronal and sagittal images were also obtained. Automated exposure control and iterative construction methods were used. Findings: Partially visualized surgical changes of intramedullary damon and screw fixation of the right femur. There is evidence for a chronic periprosthetic fracture of the intertrochanteric region extending to both the greater and lesser trochanters. There is no evidence of bony fusion at this site. The greater trochanter appears mildly displaced, with diastases measuring up to 1.1 cm on series 204 image 204. There is mild periprosthetic lucency about the proximal aspect of the intramedullary damon. Fracture line does not appear to extend to the intra-articular surface. There is no evidence of additional fracture. Osteopenia is present. Partially visualized lumbar spondylosis. Included intrapelvic structures show partial decompression of the urinary bladder with circumferential urinary bladder wall thickening. The prostate appears enlarged with multiple metallic hyperdensities in the prostate. There is colonic " diverticulosis. Multiple enlarged mesenteric lymph nodes are present. No evidence of pelvic fluid collection within the field-of-view. Atherosclerotic calcifications of the aorta and branch vessels.     Impression: Impression: Surgical changes of intramedullary damon and screw fixation of the right femur with evidence for a chronic unfused periprosthetic intertrochanteric fracture extending to both the greater and lesser trochanters. Mild periprosthetic lucency about the proximal aspect of the intramedullary damon may represent a component of hardware loosening as well. Incidentally noted enlarged prostate with adjacent circumferential urinary bladder wall thickening which could be secondary to bladder outlet obstruction. Multiple enlarged mesenteric lymph nodes are present which could be reactive. Electronically Signed: Chun Oquendo MD  9/3/2024 9:56 AM EDT  Workstation ID: [id number]    MRI Lumbar Spine Without Contrast    Result Date: 9/2/2024  Narrative: MRI LUMBAR SPINE WO CONTRAST Date of Exam: 8/30/2024 7:40 AM EDT Indication: lumbar pain.  Comparison: August 31, 2023 Technique:  Routine multiplanar/multisequence sequence images of the lumbar spine were obtained without contrast administration.  Findings: There is grade 1 anterolisthesis of L4 on L5. The vertebral body heights appear normal. There are degenerative endplate changes at L3-4 and L4-5. There is disc desiccation at L2-3 through L5-S1, with advanced degenerative loss of disc height at L2-3 and L3-4. The conus terminates at the mid L1 level. The posterior paravertebral soft tissues are unremarkable. L1-2: Mild disc bulge. Mild bilateral facet arthropathy. No spinal canal stenosis. Mild to moderate bilateral neural foramen stenosis. No change from prior. L2-3: Moderate disc bulge. Mild bilateral facet arthropathy with ligamentum flavum infolding. Moderate spinal canal stenosis. Moderate to severe right and severe left neural foraminal stenosis. No change  from prior. L3-4: Moderate disc bulge eccentric to the left. Moderate bilateral facet arthropathy with ligamentum flavum infolding. Moderate to severe spinal canal stenosis. Moderate to severe right and severe left neural foramen stenosis. No change from prior. L4-5: Moderate disc bulge. Severe right and moderate left facet arthropathy with ligamentum flavum infolding. Severe spinal canal stenosis. Severe right and moderate-severe left neuroforaminal stenosis. No change from prior. L5-S1: Mild disc bulge. Mild right and moderate left facet arthropathy. No spinal canal stenosis. Mild right and moderate left neural foraminal stenosis. No change from prior.     Impression: Impression: Moderate to advanced multilevel degenerative changes as described above, which appear fairly stable from prior MRI. Electronically Signed: Colton Bright MD  9/2/2024 7:24 PM EDT  Workstation ID: [id number]            Assessment and Plan     Diagnoses and all orders for this visit:    1. Spondylolisthesis of lumbar region (Primary)    2. History of intertrochanteric fracture of hip, right, follow up  -     Ambulatory Referral to Orthopedic Surgery        Discussed the treatment plan with the patient. I reviewed the MRI results with the patient. I reviewed the CT of the right hip.     Discussed the treatment options with the patient, operative vs non-operative. Discussed taking out hardware.      Refer to Dr Schwartz for the right hip.     Prescribed bone stimulator.  Put therapy on hold until after referral to specialist.     Educated on risk of smoking/nicotine. Discussed options for smoking cessation. and Call or return if worsening symptoms.    Follow Up     6-8 weeks.        Patient was given instructions and counseling regarding his condition or for health maintenance advice. Please see specific information pulled into the AVS if appropriate.     Scribed for Sandra Wolfe MD by Ngozi eVla MA.  09/05/24   08:09 EDT    I have  personally performed the services described in this document as scribed by the above individual and it is both accurate and complete. Sandra Wolfe MD 09/05/24

## 2024-09-13 ENCOUNTER — LAB (OUTPATIENT)
Dept: LAB | Facility: HOSPITAL | Age: 64
End: 2024-09-13
Payer: MEDICARE

## 2024-09-13 DIAGNOSIS — C61 PROSTATE CANCER: ICD-10-CM

## 2024-09-13 LAB — PSA SERPL-MCNC: 3.68 NG/ML (ref 0–4)

## 2024-09-13 PROCEDURE — 36415 COLL VENOUS BLD VENIPUNCTURE: CPT

## 2024-09-13 PROCEDURE — 84153 ASSAY OF PSA TOTAL: CPT

## 2024-09-20 ENCOUNTER — OFFICE VISIT (OUTPATIENT)
Dept: UROLOGY | Facility: CLINIC | Age: 64
End: 2024-09-20
Payer: MEDICARE

## 2024-09-20 VITALS — BODY MASS INDEX: 32.9 KG/M2 | WEIGHT: 229.28 LBS

## 2024-09-20 DIAGNOSIS — C61 PROSTATE CANCER: Primary | ICD-10-CM

## 2024-09-27 RX ORDER — LEVOTHYROXINE SODIUM 137 UG/1
TABLET ORAL
Qty: 180 TABLET | Refills: 1 | Status: SHIPPED | OUTPATIENT
Start: 2024-09-27

## 2024-09-27 RX ORDER — LIOTHYRONINE SODIUM 5 UG/1
10 TABLET ORAL DAILY
Qty: 180 TABLET | Refills: 0 | Status: SHIPPED | OUTPATIENT
Start: 2024-09-27

## 2024-10-10 ENCOUNTER — TRANSCRIBE ORDERS (OUTPATIENT)
Dept: ADMINISTRATIVE | Facility: HOSPITAL | Age: 64
End: 2024-10-10
Payer: MEDICARE

## 2024-10-10 DIAGNOSIS — S72.142A: Primary | ICD-10-CM

## 2024-10-10 DIAGNOSIS — S72.141A: Primary | ICD-10-CM

## 2024-10-10 RX ORDER — NICOTINE 21 MG/24HR
1 PATCH, TRANSDERMAL 24 HOURS TRANSDERMAL EVERY 24 HOURS
Qty: 28 EACH | Refills: 0 | Status: SHIPPED | OUTPATIENT
Start: 2024-10-10

## 2024-10-10 RX ORDER — NICOTINE 21-14-7MG
1 KIT TRANSDERMAL DAILY
Qty: 28 EACH | Refills: 0 | Status: SHIPPED | OUTPATIENT
Start: 2024-12-06

## 2024-10-10 RX ORDER — NICOTINE 21 MG/24HR
1 PATCH, TRANSDERMAL 24 HOURS TRANSDERMAL EVERY 24 HOURS
Qty: 28 EACH | Refills: 0 | Status: SHIPPED | OUTPATIENT
Start: 2024-11-08

## 2024-10-16 DIAGNOSIS — I10 ESSENTIAL HYPERTENSION: ICD-10-CM

## 2024-10-16 RX ORDER — METOPROLOL SUCCINATE 25 MG/1
TABLET, EXTENDED RELEASE ORAL
Qty: 45 TABLET | Refills: 2 | Status: SHIPPED | OUTPATIENT
Start: 2024-10-16

## 2024-10-17 ENCOUNTER — LAB (OUTPATIENT)
Dept: LAB | Facility: HOSPITAL | Age: 64
End: 2024-10-17
Payer: MEDICARE

## 2024-10-17 DIAGNOSIS — D50.9 IRON DEFICIENCY ANEMIA, UNSPECIFIED IRON DEFICIENCY ANEMIA TYPE: ICD-10-CM

## 2024-10-17 DIAGNOSIS — Z00.00 ANNUAL PHYSICAL EXAM: ICD-10-CM

## 2024-10-17 DIAGNOSIS — E78.5 HYPERLIPIDEMIA LDL GOAL <100: Chronic | ICD-10-CM

## 2024-10-17 DIAGNOSIS — I50.32 DIASTOLIC CHF, CHRONIC: ICD-10-CM

## 2024-10-17 DIAGNOSIS — E03.9 ACQUIRED HYPOTHYROIDISM: Chronic | ICD-10-CM

## 2024-10-17 LAB
ALBUMIN SERPL-MCNC: 3.8 G/DL (ref 3.5–5.2)
ALBUMIN/GLOB SERPL: 1.1 G/DL
ALP SERPL-CCNC: 108 U/L (ref 39–117)
ALT SERPL W P-5'-P-CCNC: 32 U/L (ref 1–41)
ANION GAP SERPL CALCULATED.3IONS-SCNC: 6 MMOL/L (ref 5–15)
AST SERPL-CCNC: 26 U/L (ref 1–40)
BASOPHILS # BLD AUTO: 0.05 10*3/MM3 (ref 0–0.2)
BASOPHILS NFR BLD AUTO: 0.7 % (ref 0–1.5)
BILIRUB SERPL-MCNC: 0.2 MG/DL (ref 0–1.2)
BUN SERPL-MCNC: 15 MG/DL (ref 8–23)
BUN/CREAT SERPL: 16.3 (ref 7–25)
CALCIUM SPEC-SCNC: 9.3 MG/DL (ref 8.6–10.5)
CHLORIDE SERPL-SCNC: 97 MMOL/L (ref 98–107)
CHOLEST SERPL-MCNC: 154 MG/DL (ref 0–200)
CO2 SERPL-SCNC: 31 MMOL/L (ref 22–29)
CREAT SERPL-MCNC: 0.92 MG/DL (ref 0.76–1.27)
DEPRECATED RDW RBC AUTO: 40.9 FL (ref 37–54)
EGFRCR SERPLBLD CKD-EPI 2021: 91.7 ML/MIN/1.73
EOSINOPHIL # BLD AUTO: 0.2 10*3/MM3 (ref 0–0.4)
EOSINOPHIL NFR BLD AUTO: 2.6 % (ref 0.3–6.2)
ERYTHROCYTE [DISTWIDTH] IN BLOOD BY AUTOMATED COUNT: 13.3 % (ref 12.3–15.4)
FERRITIN SERPL-MCNC: 184 NG/ML (ref 30–400)
FOLATE SERPL-MCNC: 8.76 NG/ML (ref 4.78–24.2)
GLOBULIN UR ELPH-MCNC: 3.4 GM/DL
GLUCOSE SERPL-MCNC: 88 MG/DL (ref 65–99)
HCT VFR BLD AUTO: 39.5 % (ref 37.5–51)
HDLC SERPL-MCNC: 36 MG/DL (ref 40–60)
HGB BLD-MCNC: 12.7 G/DL (ref 13–17.7)
IMM GRANULOCYTES # BLD AUTO: 0.04 10*3/MM3 (ref 0–0.05)
IMM GRANULOCYTES NFR BLD AUTO: 0.5 % (ref 0–0.5)
IRON 24H UR-MRATE: 36 MCG/DL (ref 59–158)
IRON SATN MFR SERPL: 12 % (ref 20–50)
LDLC SERPL CALC-MCNC: 106 MG/DL (ref 0–100)
LDLC/HDLC SERPL: 2.95 {RATIO}
LYMPHOCYTES # BLD AUTO: 1.42 10*3/MM3 (ref 0.7–3.1)
LYMPHOCYTES NFR BLD AUTO: 18.7 % (ref 19.6–45.3)
MCH RBC QN AUTO: 26.9 PG (ref 26.6–33)
MCHC RBC AUTO-ENTMCNC: 32.2 G/DL (ref 31.5–35.7)
MCV RBC AUTO: 83.7 FL (ref 79–97)
MONOCYTES # BLD AUTO: 0.57 10*3/MM3 (ref 0.1–0.9)
MONOCYTES NFR BLD AUTO: 7.5 % (ref 5–12)
NEUTROPHILS NFR BLD AUTO: 5.3 10*3/MM3 (ref 1.7–7)
NEUTROPHILS NFR BLD AUTO: 70 % (ref 42.7–76)
NRBC BLD AUTO-RTO: 0 /100 WBC (ref 0–0.2)
NT-PROBNP SERPL-MCNC: 91.7 PG/ML (ref 0–900)
PLATELET # BLD AUTO: 306 10*3/MM3 (ref 140–450)
PMV BLD AUTO: 9.1 FL (ref 6–12)
POTASSIUM SERPL-SCNC: 4.6 MMOL/L (ref 3.5–5.2)
PROT SERPL-MCNC: 7.2 G/DL (ref 6–8.5)
RBC # BLD AUTO: 4.72 10*6/MM3 (ref 4.14–5.8)
SODIUM SERPL-SCNC: 134 MMOL/L (ref 136–145)
T4 FREE SERPL-MCNC: 1.5 NG/DL (ref 0.92–1.68)
TIBC SERPL-MCNC: 304 MCG/DL (ref 298–536)
TRANSFERRIN SERPL-MCNC: 204 MG/DL (ref 200–360)
TRIGL SERPL-MCNC: 59 MG/DL (ref 0–150)
TSH SERPL DL<=0.05 MIU/L-ACNC: 2.8 UIU/ML (ref 0.27–4.2)
VIT B12 BLD-MCNC: 505 PG/ML (ref 211–946)
VLDLC SERPL-MCNC: 12 MG/DL (ref 5–40)
WBC NRBC COR # BLD AUTO: 7.58 10*3/MM3 (ref 3.4–10.8)

## 2024-10-17 PROCEDURE — 83540 ASSAY OF IRON: CPT

## 2024-10-17 PROCEDURE — 82746 ASSAY OF FOLIC ACID SERUM: CPT

## 2024-10-17 PROCEDURE — 84466 ASSAY OF TRANSFERRIN: CPT

## 2024-10-17 PROCEDURE — 36415 COLL VENOUS BLD VENIPUNCTURE: CPT

## 2024-10-17 PROCEDURE — 85025 COMPLETE CBC W/AUTO DIFF WBC: CPT

## 2024-10-17 PROCEDURE — 80053 COMPREHEN METABOLIC PANEL: CPT

## 2024-10-17 PROCEDURE — 83880 ASSAY OF NATRIURETIC PEPTIDE: CPT

## 2024-10-17 PROCEDURE — 84443 ASSAY THYROID STIM HORMONE: CPT

## 2024-10-17 PROCEDURE — 82607 VITAMIN B-12: CPT

## 2024-10-17 PROCEDURE — 84439 ASSAY OF FREE THYROXINE: CPT

## 2024-10-17 PROCEDURE — 80061 LIPID PANEL: CPT

## 2024-10-17 PROCEDURE — 82728 ASSAY OF FERRITIN: CPT

## 2024-10-20 RX ORDER — FERRIC MALTOL 30 MG/1
1 CAPSULE ORAL 2 TIMES DAILY
Qty: 60 CAPSULE | Refills: 5 | Status: SHIPPED | OUTPATIENT
Start: 2024-10-20

## 2024-10-21 ENCOUNTER — HOSPITAL ENCOUNTER (OUTPATIENT)
Dept: CT IMAGING | Facility: HOSPITAL | Age: 64
Discharge: HOME | End: 2024-10-21
Admitting: ORTHOPAEDIC SURGERY
Payer: MEDICARE

## 2024-10-21 DIAGNOSIS — S72.141A: ICD-10-CM

## 2024-10-21 DIAGNOSIS — S72.142A: ICD-10-CM

## 2024-10-21 PROCEDURE — 71250 CT THORAX DX C-: CPT

## 2024-10-21 PROCEDURE — 74176 CT ABD & PELVIS W/O CONTRAST: CPT

## 2024-10-22 ENCOUNTER — OFFICE VISIT (OUTPATIENT)
Dept: CARDIOLOGY | Facility: CLINIC | Age: 64
End: 2024-10-22
Payer: MEDICARE

## 2024-10-22 VITALS
BODY MASS INDEX: 33.04 KG/M2 | SYSTOLIC BLOOD PRESSURE: 114 MMHG | DIASTOLIC BLOOD PRESSURE: 69 MMHG | HEIGHT: 70 IN | HEART RATE: 68 BPM | WEIGHT: 230.8 LBS

## 2024-10-22 DIAGNOSIS — I10 ESSENTIAL HYPERTENSION: Chronic | ICD-10-CM

## 2024-10-22 DIAGNOSIS — I50.32 CHRONIC DIASTOLIC (CONGESTIVE) HEART FAILURE: Primary | Chronic | ICD-10-CM

## 2024-10-22 PROBLEM — R60.0 LOWER EXTREMITY EDEMA: Status: RESOLVED | Noted: 2023-11-20 | Resolved: 2024-10-22

## 2024-10-22 PROBLEM — M79.89 RIGHT LEG SWELLING: Status: RESOLVED | Noted: 2024-02-04 | Resolved: 2024-10-22

## 2024-10-22 RX ORDER — MELOXICAM 15 MG/1
15 TABLET ORAL DAILY
COMMUNITY

## 2024-10-22 NOTE — PROGRESS NOTES
Chief Complaint  Follow-up, Hypertension, Hyperlipidemia, and Congestive Heart Failure    Subjective            History of Present Illness  Colton Chiang is a 64-year-old male patient who presents to the office today for follow-up.  He has CHF and hypertension.  He is compliant with medication.  He denies any new or worsening cardiac symptoms today. He takes an extra lasix occasionally for increased swelling in lower extremities.     PMH  Past Medical History:   Diagnosis Date    Allergies     Arthritis     Broken bones     Cracked    CHF (congestive heart failure) 1990    Chronic back pain     Deafness, bilateral     Depression     HBP (high blood pressure)     Heart disease     High cholesterol     History of radiation therapy     Hypothyroid 02/10/2017    Prostate cancer 02/05/2016    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, bilateral     Stroke     Thyroid disorder          ALLERGY  Allergies   Allergen Reactions    Penicillin G Hives     Reaction as a kid    Penicillins Irritability          SURGICALHX  Past Surgical History:   Procedure Laterality Date    COLONOSCOPY      HIP TROCHANTERIC NAILING WITH INTRAMEDULLARY HIP SCREW Right 10/30/2023    Procedure: HIP TROCHANTERIC NAILING WITH INTRAMEDULLARY HIP SCREW;  Surgeon: Sandra Wolfe MD;  Location[facility];  Service: Orthopedics;  Laterality: Right;    PROSTATE BIOPSY            SOC  Social History     Socioeconomic History    Marital status:    Tobacco Use    Smoking status: Every Day     Current packs/day: 2.00     Average packs/day: 2.0 packs/day for 52.8 years (105.6 ttl pk-yrs)     Types: Cigarettes     Start date: 1/1/1972    Smokeless tobacco: Never   Vaping Use    Vaping status: Never Used   Substance and Sexual Activity    Alcohol use: Never    Drug use: Never    Sexual activity: Defer         FAMHX  Family History   Problem Relation Age of Onset    Breast cancer Sister     Heart disease Other     Diabetes Other         Unspecified     "      SERA  Current Outpatient Medications on File Prior to Visit   Medication Sig    Ascorbic Acid (Vitamin C) 500 MG capsule Take 1 capsule by mouth Daily.    aspirin 81 MG EC tablet Take 1 tablet by mouth Daily.    Coenzyme Q10 (Co Q-10) 300 MG capsule Take 1 capsule by mouth Every Other Day. (Patient taking differently: Take 1 capsule by mouth Daily.)    Ferric Maltol (ACCRUFeR) 30 MG capsule Take 1 capsule by mouth 2 (Two) Times a Day.    furosemide (LASIX) 40 MG tablet Take 1 tablet by mouth Daily.    levothyroxine (Synthroid) 137 MCG tablet TAKE TWO TABLETS BY MOUTH EVERY MORNING 30 MINUTES BEFORE BREAKFAST. INCREASED DOSE    liothyronine (CYTOMEL) 5 MCG tablet TAKE TWO TABLETS BY MOUTH ONCE DAILY    Magnesium 250 MG tablet Take 1 tablet by mouth Daily.    meloxicam (MOBIC) 15 MG tablet Take 1 tablet by mouth Daily.    metoprolol succinate XL (TOPROL-XL) 25 MG 24 hr tablet TAKE 1/2 TABLET DAILY    multivitamin with minerals tablet tablet Take 1 tablet by mouth Daily.    [START ON 11/8/2024] nicotine (NICODERM CQ) 14 MG/24HR patch Place 1 patch on the skin as directed by provider Daily.    nicotine (NICODERM CQ) 21 MG/24HR patch Place 1 patch on the skin as directed by provider Daily.    [START ON 12/6/2024] Nicotine 21-14-7 MG/24HR kit Place 1 each on the skin as directed by provider Daily.    Zinc 50 MG capsule Take 50 mg by mouth Daily.     No current facility-administered medications on file prior to visit.         Objective   /69   Pulse 68   Ht 177.8 cm (70\")   Wt 105 kg (230 lb 12.8 oz)   BMI 33.12 kg/m²       Physical Exam  HENT:      Head: Normocephalic.   Neck:      Vascular: No carotid bruit.   Cardiovascular:      Rate and Rhythm: Normal rate and regular rhythm.      Pulses: Normal pulses.      Heart sounds: Normal heart sounds. No murmur heard.  Pulmonary:      Effort: Pulmonary effort is normal.      Breath sounds: Normal breath sounds.   Musculoskeletal:      Cervical back: Neck " "supple.      Right lower le+ Pitting Edema present.      Left lower le+ Pitting Edema present.   Skin:     General: Skin is dry.   Neurological:      Mental Status: He is alert and oriented to person, place, and time.   Psychiatric:         Behavior: Behavior normal.       ECG 12 Lead    Date/Time: 10/22/2024 8:58 AM  Performed by: Priscilla Starkey APRN    Authorized by: Priscilla Starkey APRN  Previous ECG: no previous ECG available  Rhythm: sinus rhythm  Rate: normal  BPM: 63  Conduction: conduction normal  ST Segments: ST segments normal  QRS axis: normal  Other findings: T wave abnormality    Clinical impression: abnormal EKG            Result Review :   The following data was reviewed by: PRIMO Mckeon on 10/22/2024:  proBNP   Date Value Ref Range Status   10/17/2024 91.7 0.0 - 900.0 pg/mL Final         2/15/2024    08:24 10/17/2024    09:54   CMP   Glucose 91  88    BUN 16  15    Creatinine 0.79  0.92    EGFR 98.0  91.7    Sodium 138  134    Potassium 4.7  4.6    Chloride 100  97    Calcium 9.1  9.3    Total Protein 7.0  7.2    Albumin 4.0  3.8    Globulin  3.4    Total Bilirubin 0.2  0.2    Alkaline Phosphatase 93  108    AST (SGOT) 33  26    ALT (SGPT) 34  32    Albumin/Globulin Ratio  1.1    BUN/Creatinine Ratio 20.3  16.3    Anion Gap 8.7  6.0          2024    09:41 10/17/2024    09:54   CBC w/Diff   WBC 5.69  7.58    RBC 4.83  4.72    Hemoglobin 13.3  12.7    Hematocrit 41.0  39.5    MCV 84.9  83.7    MCH 27.5  26.9    MCHC 32.4  32.2    RDW 12.8  13.3    Platelets 248  306    Neutrophil Rel % 62.7  70.0    Immature Granulocyte Rel % 0.4  0.5    Lymphocyte Rel % 23.0  18.7    Monocyte Rel % 8.1  7.5    Eosinophil Rel % 4.4  2.6    Basophil Rel % 1.4  0.7       Lab Results   Component Value Date    TSH 2.800 10/17/2024      Lab Results   Component Value Date    FREET4 1.50 10/17/2024      No results found for: \"DDIMERQUANT\"  Magnesium   Date Value Ref Range Status   2023 " "1.8 1.6 - 2.4 mg/dL Final      No results found for: \"DIGOXIN\"   No results found for: \"TROPONINT\"        Results for orders placed in visit on 12/29/22    Adult Transthoracic Echo Complete W/ Cont if Necessary Per Protocol    Interpretation Summary  Normal left ventricular systolic function.  No significant valve abnormalities noted.           Assessment and Plan    Diagnoses and all orders for this visit:    1. Chronic diastolic (congestive) heart failure (Primary)  He has mild evidence of fluid overload on exam today. We discussed the importance of fluid and sodium restriction, compression socks, and daily weight. Continue lasix 40 mg daily.  -     ECG 12 Lead    2. Essential hypertension  Currently controlled and without adverse effects from medication, continue metoprolol 25 mg daily.          Follow Up   Return in about 6 months (around 4/22/2025) for Follow up with Dr Concepcion.    Patient was given instructions and counseling regarding his condition or for health maintenance advice. Please see specific information pulled into the AVS if appropriate.            Priscilla Starkey, APRN  10/22/24  08:15 EDT    Dictated Utilizing Dragon Dictation    "

## 2024-10-28 ENCOUNTER — HOSPITAL ENCOUNTER (OUTPATIENT)
Dept: NUCLEAR MEDICINE | Facility: HOSPITAL | Age: 64
Discharge: HOME | End: 2024-10-28
Payer: MEDICARE

## 2024-10-28 DIAGNOSIS — S72.142A: ICD-10-CM

## 2024-10-28 DIAGNOSIS — S72.141A: ICD-10-CM

## 2024-10-28 PROCEDURE — 0 TECHNETIUM MEDRONATE KIT: Performed by: ORTHOPAEDIC SURGERY

## 2024-10-28 PROCEDURE — 78306 BONE IMAGING WHOLE BODY: CPT

## 2024-10-28 PROCEDURE — A9503 TC99M MEDRONATE: HCPCS | Performed by: ORTHOPAEDIC SURGERY

## 2024-10-28 RX ORDER — TC 99M MEDRONATE 20 MG/10ML
20.5 INJECTION, POWDER, LYOPHILIZED, FOR SOLUTION INTRAVENOUS
Status: COMPLETED | OUTPATIENT
Start: 2024-10-28 | End: 2024-10-28

## 2024-10-28 RX ADMIN — TC 99M MEDRONATE 20.5 MILLICURIE: 20 INJECTION, POWDER, LYOPHILIZED, FOR SOLUTION INTRAVENOUS at 12:20

## 2024-11-15 ENCOUNTER — TELEPHONE (OUTPATIENT)
Dept: FAMILY MEDICINE CLINIC | Facility: CLINIC | Age: 64
End: 2024-11-15

## 2024-11-15 NOTE — TELEPHONE ENCOUNTER
Caller: DIONI HAWKINS    Relationship to patient: Emergency Contact    Best call back number: [phone]     Chief complaint: SURGERY CLEARANCE- THYROID REVIEW    Type of visit: OFFICE    Requested date: ASAP     Additional notes:CALLER STATES PATIENT NEEDS TO HAVE HIP SURGERY AND CANNOT DO IT UNTIL SEEN BY PROVIDER TO DISCUSS THYROID ISSUES. DR. PUENTE SHOULD HAVE RECEIVED A LETTER STATING ABOVE.    NO AVAILABLE APPOINTMENTS FOR HUB TO SCHEDULE, PLEASE CONTACT CALLER TO SCHEDULE WITH DR. PUENTE ONLY ASA.    BH VERBAL WILL NEED TO BE UPDATED AS THERE IS NO PATIENT NAME ON THE TOP LINE.

## 2024-11-18 ENCOUNTER — OFFICE VISIT (OUTPATIENT)
Dept: FAMILY MEDICINE CLINIC | Facility: CLINIC | Age: 64
End: 2024-11-18
Payer: MEDICARE

## 2024-11-18 VITALS
DIASTOLIC BLOOD PRESSURE: 63 MMHG | HEIGHT: 70 IN | RESPIRATION RATE: 20 BRPM | SYSTOLIC BLOOD PRESSURE: 118 MMHG | HEART RATE: 63 BPM | TEMPERATURE: 97.8 F | WEIGHT: 231.6 LBS | BODY MASS INDEX: 33.16 KG/M2 | OXYGEN SATURATION: 98 %

## 2024-11-18 DIAGNOSIS — I10 ESSENTIAL HYPERTENSION: Chronic | ICD-10-CM

## 2024-11-18 DIAGNOSIS — D50.9 IRON DEFICIENCY ANEMIA, UNSPECIFIED IRON DEFICIENCY ANEMIA TYPE: ICD-10-CM

## 2024-11-18 DIAGNOSIS — E03.9 ACQUIRED HYPOTHYROIDISM: Chronic | ICD-10-CM

## 2024-11-18 DIAGNOSIS — E66.811 CLASS 1 OBESITY DUE TO EXCESS CALORIES WITH SERIOUS COMORBIDITY AND BODY MASS INDEX (BMI) OF 33.0 TO 33.9 IN ADULT: Chronic | ICD-10-CM

## 2024-11-18 DIAGNOSIS — E66.09 CLASS 1 OBESITY DUE TO EXCESS CALORIES WITH SERIOUS COMORBIDITY AND BODY MASS INDEX (BMI) OF 33.0 TO 33.9 IN ADULT: Chronic | ICD-10-CM

## 2024-11-18 DIAGNOSIS — M62.838 MUSCLE SPASM: Primary | ICD-10-CM

## 2024-11-18 DIAGNOSIS — F17.219 CIGARETTE NICOTINE DEPENDENCE WITH NICOTINE-INDUCED DISORDER: Chronic | ICD-10-CM

## 2024-11-18 PROCEDURE — 3074F SYST BP LT 130 MM HG: CPT | Performed by: FAMILY MEDICINE

## 2024-11-18 PROCEDURE — 1160F RVW MEDS BY RX/DR IN RCRD: CPT | Performed by: FAMILY MEDICINE

## 2024-11-18 PROCEDURE — 99214 OFFICE O/P EST MOD 30 MIN: CPT | Performed by: FAMILY MEDICINE

## 2024-11-18 PROCEDURE — 1159F MED LIST DOCD IN RCRD: CPT | Performed by: FAMILY MEDICINE

## 2024-11-18 PROCEDURE — 1125F AMNT PAIN NOTED PAIN PRSNT: CPT | Performed by: FAMILY MEDICINE

## 2024-11-18 PROCEDURE — G2211 COMPLEX E/M VISIT ADD ON: HCPCS | Performed by: FAMILY MEDICINE

## 2024-11-18 PROCEDURE — 3078F DIAST BP <80 MM HG: CPT | Performed by: FAMILY MEDICINE

## 2024-11-18 RX ORDER — CYCLOBENZAPRINE HCL 5 MG
TABLET ORAL
Qty: 90 TABLET | Refills: 5 | Status: SHIPPED | OUTPATIENT
Start: 2024-11-18

## 2024-11-18 RX ORDER — CHOLECALCIFEROL (VITAMIN D3) 25 MCG
1000 TABLET ORAL 2 TIMES DAILY
COMMUNITY

## 2024-11-18 NOTE — PROGRESS NOTES
"Chief Complaint  Medication Problem (Pt states the he's seen a dermatologist d/t knots on his RLE. Pt reports that derm biopsied area and results were WNL. States that derm believes it may be related to his thyroid medication.)    Subjective        Colton Chiang presents to [facility]  History of Present Illness  The patient is here today for a follow-up for the management of his acute and chronic medical conditions.  He is  with one daughter.  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 He has a family history of breast cancer.     The patient is still continuing to smoke.     The patient is continue to take 2-5 mcg Cytomel and 2-125 mcg levothyroxine daily.  His most recent TSH on 8/9/22 was found to be 1.08.  He denies feeling any more fatigued than he usually does.  The patient works a lot of hours and stays tired a lot.     He does not check his blood pressure at home. He is followed by cardiology. He states his blood pressure is normally 90-100s/80s.     His right lower leg swelling, pain and redness is unchanged. He has been taking an extra 20 mg of lasix every other day to help with his swelling. He does not like compression stockings.     He is scheduled for right hip replacement.      The patient has no other complaints today and denies chest pain, shortness of breath, weakness, numbness, nausea, vomiting, diarrhea, dizziness or syncopal event.        Objective   Vital Signs:  /63 (BP Location: Left arm, Patient Position: Sitting, Cuff Size: Adult)   Pulse 63   Temp 97.8 °F (36.6 °C) (Temporal)   Resp 20   Ht 177.8 cm (70\")   Wt 105 kg (231 lb 9.6 oz)   SpO2 98%   BMI 33.23 kg/m²   Estimated body mass index is 33.23 kg/m² as calculated from the following:    " "Height as of this encounter: 177.8 cm (70\").    Weight as of this encounter: 105 kg (231 lb 9.6 oz).            Physical Exam  Vitals reviewed.   Constitutional:       Appearance: He is well-developed. He is obese.   HENT:      Head: Normocephalic and atraumatic.      Right Ear: External ear normal.      Left Ear: External ear normal.      Mouth/Throat:      Pharynx: No oropharyngeal exudate.   Eyes:      Conjunctiva/sclera: Conjunctivae normal.      Pupils: Pupils are equal, round, and reactive to light.   Neck:      Vascular: No carotid bruit.   Cardiovascular:      Rate and Rhythm: Normal rate and regular rhythm.      Heart sounds: No murmur heard.     No friction rub. No gallop.   Pulmonary:      Effort: Pulmonary effort is normal.      Breath sounds: Normal breath sounds. No wheezing or rhonchi.   Abdominal:      General: There is no distension.   Skin:     General: Skin is warm and dry.   Neurological:      Mental Status: He is alert and oriented to person, place, and time.      Cranial Nerves: No cranial nerve deficit.      Motor: No weakness.   Psychiatric:         Mood and Affect: Mood and affect normal.         Behavior: Behavior normal.         Thought Content: Thought content normal.         Judgment: Judgment normal.        Result Review :    CMP          2/15/2024    08:24 10/17/2024    09:54   CMP   Glucose 91  88    BUN 16  15    Creatinine 0.79  0.92    EGFR 98.0  91.7    Sodium 138  134    Potassium 4.7  4.6    Chloride 100  97    Calcium 9.1  9.3    Total Protein 7.0  7.2    Albumin 4.0  3.8    Globulin  3.4    Total Bilirubin 0.2  0.2    Alkaline Phosphatase 93  108    AST (SGOT) 33  26    ALT (SGPT) 34  32    Albumin/Globulin Ratio  1.1    BUN/Creatinine Ratio 20.3  16.3    Anion Gap 8.7  6.0      CBC          6/7/2024    09:41 10/17/2024    09:54   CBC   WBC 5.69  7.58    RBC 4.83  4.72    Hemoglobin 13.3  12.7    Hematocrit 41.0  39.5    MCV 84.9  83.7    MCH 27.5  26.9    MCHC 32.4  32.2  "   RDW 12.8  13.3    Platelets 248  306      Lipid Panel          2/15/2024    08:24 6/7/2024    09:45 10/17/2024    09:54   Lipid Panel   Total Cholesterol 163  175  154    Triglycerides 57  62  59    HDL Cholesterol 38  39  36    VLDL Cholesterol 11  12  12    LDL Cholesterol  114  124  106    LDL/HDL Ratio 2.99  3.17  2.95      TSH          6/7/2024    09:41 10/17/2024    09:54   TSH   TSH 6.540  2.800                Assessment and Plan   Diagnoses and all orders for this visit:    1. Muscle spasm (Primary)  -     cyclobenzaprine (FLEXERIL) 5 MG tablet; Take 1-2 tablets tid prn for muscle spasm  Dispense: 90 tablet; Refill: 5    2. Essential hypertension  Assessment & Plan:  Hypertension is stable and controlled  Continue current treatment regimen.  Dietary sodium restriction.  Weight loss.  Blood pressure will be reassessed in 6 months.      3. Class 1 obesity due to excess calories with serious comorbidity and body mass index (BMI) of 33.0 to 33.9 in adult  Assessment & Plan:  Patient's (Body mass index is 33.23 kg/m².) indicates that they are obese (BMI >30) with health conditions that include hypertension and dyslipidemias . Weight is worsening. BMI  is above average; BMI management plan is completed. We discussed low calorie, low carb based diet program, portion control, and increasing exercise.       4. Acquired hypothyroidism  Assessment & Plan:  TSH elevated and free T4 low normal. Increased levothyroxine to 137 x 2 daily. Labs in 6 months.       5. Iron deficiency anemia, unspecified iron deficiency anemia type  Assessment & Plan:  His iron levels are chronically low. He will be continued on iron replacement and his levels will be rechecked at before his next visit.       6. Cigarette nicotine dependence with nicotine-induced disorder  Assessment & Plan:  Tobacco use is unchanged.  Smoking cessation counseling was provided.  Tobacco use will be reassessed in 6  months.                                                     Follow Up   Return in about 6 months (around 5/18/2025).  Patient was given instructions and counseling regarding his condition or for health maintenance advice. Please see specific information pulled into the AVS if appropriate.

## 2024-11-19 NOTE — ASSESSMENT & PLAN NOTE
His iron levels are chronically low. He will be continued on iron replacement and his levels will be rechecked at before his next visit.

## 2024-11-19 NOTE — ASSESSMENT & PLAN NOTE
Patient's (Body mass index is 33.23 kg/m².) indicates that they are obese (BMI >30) with health conditions that include hypertension and dyslipidemias . Weight is worsening. BMI  is above average; BMI management plan is completed. We discussed low calorie, low carb based diet program, portion control, and increasing exercise.

## 2024-12-26 RX ORDER — LIOTHYRONINE SODIUM 5 UG/1
10 TABLET ORAL DAILY
Qty: 180 TABLET | Refills: 0 | Status: SHIPPED | OUTPATIENT
Start: 2024-12-26

## 2025-02-03 ENCOUNTER — TELEPHONE (OUTPATIENT)
Dept: FAMILY MEDICINE CLINIC | Facility: CLINIC | Age: 65
End: 2025-02-03
Payer: MEDICARE

## 2025-02-03 NOTE — TELEPHONE ENCOUNTER
Kathryn with [facility] called stating they are seeing him for his Right LEE.  She states patient reports he is DNR.  She is asking if this is ok to place in patients chart?

## 2025-02-05 NOTE — TELEPHONE ENCOUNTER
I would advise the patient against this. I would advise he has a living will with his wife as health care surrogate. DNR is for older or terminal patients. I will discuss at his next visit.

## 2025-02-14 RX ORDER — ROPINIROLE 0.25 MG/1
TABLET, FILM COATED ORAL
Qty: 60 TABLET | Refills: 2 | Status: SHIPPED | OUTPATIENT
Start: 2025-02-14 | End: 2025-02-14

## 2025-02-14 RX ORDER — ROPINIROLE 0.25 MG/1
TABLET, FILM COATED ORAL
Qty: 60 TABLET | Refills: 2 | Status: SHIPPED | OUTPATIENT
Start: 2025-02-14

## 2025-02-18 ENCOUNTER — TELEPHONE (OUTPATIENT)
Dept: FAMILY MEDICINE CLINIC | Facility: CLINIC | Age: 65
End: 2025-02-18

## 2025-02-18 ENCOUNTER — TELEPHONE (OUTPATIENT)
Dept: FAMILY MEDICINE CLINIC | Facility: CLINIC | Age: 65
End: 2025-02-18
Payer: MEDICARE

## 2025-02-18 NOTE — TELEPHONE ENCOUNTER
Caller: DIONI HAWKINS    Relationship to patient: Emergency Contact    Best call back number:     [phone] (Home)       Patient is needing: Patient's wife called in and is requesting a prescription for Gabapentin 100 mg one to three times daily. Patient's wife said patient was given this medication in the hospital and it has greatly helped patient.    [facility] - [place], [place] - [address] - [phone]  - [phone]  [phone]

## 2025-02-18 NOTE — TELEPHONE ENCOUNTER
Caller: AUGIE AT [facility]    Relationship: Other    Best call back number: [phone]    What orders are you requesting (i.e. lab or imaging): HOME HEALTH ORDERS FOR ONCE A WEEK FOR THREE WEEKS     In what timeframe would the patient need to come in: ASAP    Additional notes: AUGIE STATES THE PATIENTS WEIGHT HAS INCREASED  FROM 224, BUT THE PATIENT REPORTED THAT WEIGHT GAIN HAPPENED AFTER BEING HOSPITALIZED. AUGIE STATES SHE DOESN'T THINK THE WEIGHT GAIN IS CONCERNING BUT SHE HAS TO REPORT IT. AUGIE STATES THE PATIENT HAS REDNESS AND WARMTH ON HIS THIGH INCISIONS SO SHE IS GOING TO CONTACT HIS SURGEON.

## 2025-02-18 NOTE — TELEPHONE ENCOUNTER
Informed pt that he would need UDS/CSA to continue this medication. Pt states he will stay on Robaxin for now and see how that goes. He will make an appointment at a later time if he needs to.

## 2025-03-10 ENCOUNTER — LAB (OUTPATIENT)
Dept: LAB | Facility: HOSPITAL | Age: 65
End: 2025-03-10
Payer: MEDICARE

## 2025-03-10 DIAGNOSIS — I10 ESSENTIAL HYPERTENSION: Chronic | ICD-10-CM

## 2025-03-10 DIAGNOSIS — D50.9 IRON DEFICIENCY ANEMIA, UNSPECIFIED IRON DEFICIENCY ANEMIA TYPE: ICD-10-CM

## 2025-03-10 DIAGNOSIS — E03.9 ACQUIRED HYPOTHYROIDISM: Chronic | ICD-10-CM

## 2025-03-10 DIAGNOSIS — C61 PROSTATE CANCER: ICD-10-CM

## 2025-03-10 LAB
ALBUMIN SERPL-MCNC: 4 G/DL (ref 3.5–5.2)
ALBUMIN/GLOB SERPL: 1.1 G/DL
ALP SERPL-CCNC: 163 U/L (ref 39–117)
ALT SERPL W P-5'-P-CCNC: 28 U/L (ref 1–41)
ANION GAP SERPL CALCULATED.3IONS-SCNC: 11.1 MMOL/L (ref 5–15)
AST SERPL-CCNC: 33 U/L (ref 1–40)
BASOPHILS # BLD AUTO: 0.06 10*3/MM3 (ref 0–0.2)
BASOPHILS NFR BLD AUTO: 1.1 % (ref 0–1.5)
BILIRUB SERPL-MCNC: <0.2 MG/DL (ref 0–1.2)
BUN SERPL-MCNC: 13 MG/DL (ref 8–23)
BUN/CREAT SERPL: 13.5 (ref 7–25)
CALCIUM SPEC-SCNC: 9.6 MG/DL (ref 8.6–10.5)
CHLORIDE SERPL-SCNC: 95 MMOL/L (ref 98–107)
CO2 SERPL-SCNC: 28.9 MMOL/L (ref 22–29)
CREAT SERPL-MCNC: 0.96 MG/DL (ref 0.76–1.27)
DEPRECATED RDW RBC AUTO: 42.1 FL (ref 37–54)
EGFRCR SERPLBLD CKD-EPI 2021: 86.6 ML/MIN/1.73
EOSINOPHIL # BLD AUTO: 0.2 10*3/MM3 (ref 0–0.4)
EOSINOPHIL NFR BLD AUTO: 3.6 % (ref 0.3–6.2)
ERYTHROCYTE [DISTWIDTH] IN BLOOD BY AUTOMATED COUNT: 14 % (ref 12.3–15.4)
FERRITIN SERPL-MCNC: 104 NG/ML (ref 30–400)
GLOBULIN UR ELPH-MCNC: 3.7 GM/DL
GLUCOSE SERPL-MCNC: 84 MG/DL (ref 65–99)
HCT VFR BLD AUTO: 38.1 % (ref 37.5–51)
HGB BLD-MCNC: 12.1 G/DL (ref 13–17.7)
IMM GRANULOCYTES # BLD AUTO: 0.01 10*3/MM3 (ref 0–0.05)
IMM GRANULOCYTES NFR BLD AUTO: 0.2 % (ref 0–0.5)
IRON 24H UR-MRATE: 31 MCG/DL (ref 59–158)
IRON SATN MFR SERPL: 8 % (ref 20–50)
LYMPHOCYTES # BLD AUTO: 1.15 10*3/MM3 (ref 0.7–3.1)
LYMPHOCYTES NFR BLD AUTO: 20.8 % (ref 19.6–45.3)
MCH RBC QN AUTO: 26.9 PG (ref 26.6–33)
MCHC RBC AUTO-ENTMCNC: 31.8 G/DL (ref 31.5–35.7)
MCV RBC AUTO: 84.7 FL (ref 79–97)
MONOCYTES # BLD AUTO: 0.55 10*3/MM3 (ref 0.1–0.9)
MONOCYTES NFR BLD AUTO: 10 % (ref 5–12)
NEUTROPHILS NFR BLD AUTO: 3.55 10*3/MM3 (ref 1.7–7)
NEUTROPHILS NFR BLD AUTO: 64.3 % (ref 42.7–76)
NRBC BLD AUTO-RTO: 0 /100 WBC (ref 0–0.2)
PLATELET # BLD AUTO: 326 10*3/MM3 (ref 140–450)
PMV BLD AUTO: 9.2 FL (ref 6–12)
POTASSIUM SERPL-SCNC: 4.7 MMOL/L (ref 3.5–5.2)
PROT SERPL-MCNC: 7.7 G/DL (ref 6–8.5)
PSA SERPL-MCNC: 5.69 NG/ML (ref 0–4)
RBC # BLD AUTO: 4.5 10*6/MM3 (ref 4.14–5.8)
SODIUM SERPL-SCNC: 135 MMOL/L (ref 136–145)
T4 FREE SERPL-MCNC: 1.55 NG/DL (ref 0.92–1.68)
TIBC SERPL-MCNC: 367 MCG/DL (ref 298–536)
TRANSFERRIN SERPL-MCNC: 246 MG/DL (ref 200–360)
TSH SERPL DL<=0.05 MIU/L-ACNC: 5.34 UIU/ML (ref 0.27–4.2)
WBC NRBC COR # BLD AUTO: 5.52 10*3/MM3 (ref 3.4–10.8)

## 2025-03-10 PROCEDURE — 84153 ASSAY OF PSA TOTAL: CPT

## 2025-03-10 PROCEDURE — 36415 COLL VENOUS BLD VENIPUNCTURE: CPT

## 2025-03-10 PROCEDURE — 80053 COMPREHEN METABOLIC PANEL: CPT

## 2025-03-10 PROCEDURE — 83540 ASSAY OF IRON: CPT

## 2025-03-10 PROCEDURE — 84466 ASSAY OF TRANSFERRIN: CPT

## 2025-03-10 PROCEDURE — 84439 ASSAY OF FREE THYROXINE: CPT

## 2025-03-10 PROCEDURE — 82728 ASSAY OF FERRITIN: CPT

## 2025-03-10 PROCEDURE — 84443 ASSAY THYROID STIM HORMONE: CPT

## 2025-03-10 PROCEDURE — 85025 COMPLETE CBC W/AUTO DIFF WBC: CPT

## 2025-03-16 NOTE — PROGRESS NOTES
Chief Complaint    Urologic complaint    Subjective          Colton Chiang presents to [facility]  History of Present Illness      65-year-old  gentleman       Prostatic adenocarcinoma    status post XRT in 2008 for Roxanne 8   ED        Patient had hip replacement in January.  Slowly improving    Walker  PT    Pt still active.    Voiding ok.  Slow stream.    Not bothersome    No straining.  Nocturia 3-4  No prostate meds.         on Lasix       PVR    8/23    023    No cardiopulmonary history. He smokes 1.5 packs daily. Baby aspirin daily         Previous          Patient did do Lupron before when he did his original XRT    History of CHF been well controlled for several years    Patient has had some lower extremity edema on the right, he has been ruled out for DVT      Has ED, it is worrisome.    Patient does have some hip pain.  He is getting hip MRI in the month      Patient has had no gross hematuria, dysuria or recurrent urinary tract infections. No history of kidney or bladder stone.     never had any urologic surgery.        10/22 CT abdomen/pelvis with - fractures of lateral right ninth 10th and 11th ribs.  Cholelithiasis        Prostate cancer history    3/25     5.6    9/24     3.6    5/24     3.2      1/24 discussed referral to tertiary center for discussion of further treatment or salvage prostatectomy.  Patient has declined risk and benefits understood  1/24     2.8       12/27/2023 PSMA - focal area of uptake right prostatic base concerning for malignancy recurrence.  Cervical, supraclavicular and thoracic adenopathy mild radiotracer uptake.  Unchanged since 10/22.  Close follow-up recommended.  Mild increased uptake left humeral head.  Likely avascular necrosis based on noncontrast CT findings.  Mild uptake T10 vertebral body.  Indeterminate.  Attention to follow-up.  Extensive groundglass and pulmonary opacification throughout the bilateral lungs.  Similar to  10/22.  Pulmonary referral recommended.  12/23 discussed tertiary referral for possibility of salvage prostectomy.  Patient not interested, was not interested in second opinion.    10/23    3.17     6/23      2.2  5/22      1.8  12/21    1.9  9/20     1.2  2/17    0.51  8/16    0.38  2/16    0.46  7/15    0.35  1/15   0.41  7/14    0.24  1/12    0.30  7/10    0.5  7/09     0.7  12/08   0.2  2008 XRT prostate  10/07 prostate biopsyprostatic adenocarcinoma, 5+3    9/07 17.2          Past History:  Medical History: has a past medical history of Allergies, Arthritis, Broken bones, CHF (congestive heart failure) (1990), Chronic back pain, Deafness, bilateral, Depression, HBP (high blood pressure), Heart disease, High cholesterol, History of radiation therapy, Hypothyroid (02/10/2017), Prostate cancer (02/05/2016), 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   Surgical History: has a past surgical history that includes Colonoscopy; Prostate biopsy; Hip Trochanteric Nailing (Right, 10/30/2023); and Skin biopsy (Right).   Family History:          Objective     Vital Signs:   There were no vitals taken for this visit.             Assessment and Plan    Diagnoses and all orders for this visit:    1. Prostate cancer (Primary)        PSA  higher     We discussed this today.    We did again discuss possibility of tertiary referral.    We did discuss possibility of moving forward with PSMA scan again to see if he has metastatic disease versus localized disease.  He is not considering salvage localized treatment.  Not wanting tertiary referral      I did discuss this point I would think it would be a good idea to go ahead and start antidepression therapy.    Risk and benefits discussed      Follow-up in a few weeks with another PSA to make sure this number is true and climbing if it is we will go and get him started on Lupron          Follow-up in 4 wks with PSA

## 2025-03-17 RX ORDER — LEVOTHYROXINE SODIUM 137 UG/1
TABLET ORAL
Qty: 180 TABLET | Refills: 1 | Status: SHIPPED | OUTPATIENT
Start: 2025-03-17

## 2025-03-17 RX ORDER — LIOTHYRONINE SODIUM 5 UG/1
10 TABLET ORAL DAILY
Qty: 180 TABLET | Refills: 1 | Status: SHIPPED | OUTPATIENT
Start: 2025-03-17

## 2025-03-18 ENCOUNTER — OFFICE VISIT (OUTPATIENT)
Dept: UROLOGY | Age: 65
End: 2025-03-18
Payer: MEDICARE

## 2025-03-18 VITALS — WEIGHT: 231 LBS | BODY MASS INDEX: 33.07 KG/M2 | HEIGHT: 70 IN

## 2025-03-18 DIAGNOSIS — C61 PROSTATE CANCER: Primary | ICD-10-CM

## 2025-03-18 PROCEDURE — 1160F RVW MEDS BY RX/DR IN RCRD: CPT | Performed by: UROLOGY

## 2025-03-18 PROCEDURE — 1159F MED LIST DOCD IN RCRD: CPT | Performed by: UROLOGY

## 2025-03-18 PROCEDURE — 99213 OFFICE O/P EST LOW 20 MIN: CPT | Performed by: UROLOGY

## 2025-03-18 RX ORDER — SULFAMETHOXAZOLE AND TRIMETHOPRIM 800; 160 MG/1; MG/1
1 TABLET ORAL
COMMUNITY
Start: 2025-03-13 | End: 2025-03-27

## 2025-03-18 RX ORDER — GABAPENTIN 100 MG/1
CAPSULE ORAL
COMMUNITY
Start: 2025-02-03

## 2025-04-15 DIAGNOSIS — I50.32 CHRONIC DIASTOLIC (CONGESTIVE) HEART FAILURE: Chronic | ICD-10-CM

## 2025-04-15 RX ORDER — FUROSEMIDE 40 MG/1
40 TABLET ORAL DAILY
Qty: 90 TABLET | Refills: 1 | Status: SHIPPED | OUTPATIENT
Start: 2025-04-15

## 2025-04-21 ENCOUNTER — LAB (OUTPATIENT)
Dept: LAB | Facility: HOSPITAL | Age: 65
End: 2025-04-21
Payer: MEDICARE

## 2025-04-21 DIAGNOSIS — C61 PROSTATE CANCER: ICD-10-CM

## 2025-04-21 PROCEDURE — 36415 COLL VENOUS BLD VENIPUNCTURE: CPT

## 2025-04-21 PROCEDURE — 84153 ASSAY OF PSA TOTAL: CPT

## 2025-04-22 LAB — PSA SERPL-MCNC: 4.37 NG/ML (ref 0–4)

## 2025-04-29 ENCOUNTER — OFFICE VISIT (OUTPATIENT)
Dept: CARDIOLOGY | Facility: CLINIC | Age: 65
End: 2025-04-29
Payer: MEDICARE

## 2025-04-29 ENCOUNTER — OFFICE VISIT (OUTPATIENT)
Dept: UROLOGY | Age: 65
End: 2025-04-29
Payer: MEDICARE

## 2025-04-29 VITALS — BODY MASS INDEX: 32.78 KG/M2 | WEIGHT: 229 LBS | HEIGHT: 70 IN

## 2025-04-29 VITALS
WEIGHT: 229.4 LBS | HEART RATE: 64 BPM | DIASTOLIC BLOOD PRESSURE: 59 MMHG | SYSTOLIC BLOOD PRESSURE: 112 MMHG | HEIGHT: 70 IN | BODY MASS INDEX: 32.84 KG/M2

## 2025-04-29 DIAGNOSIS — I50.32 CHRONIC DIASTOLIC (CONGESTIVE) HEART FAILURE: Primary | Chronic | ICD-10-CM

## 2025-04-29 DIAGNOSIS — C61 PROSTATE CANCER: Primary | ICD-10-CM

## 2025-04-29 DIAGNOSIS — I10 ESSENTIAL HYPERTENSION: Chronic | ICD-10-CM

## 2025-04-29 PROCEDURE — 3078F DIAST BP <80 MM HG: CPT | Performed by: NURSE PRACTITIONER

## 2025-04-29 PROCEDURE — 99213 OFFICE O/P EST LOW 20 MIN: CPT | Performed by: NURSE PRACTITIONER

## 2025-04-29 PROCEDURE — 3074F SYST BP LT 130 MM HG: CPT | Performed by: NURSE PRACTITIONER

## 2025-04-29 NOTE — PROGRESS NOTES
Chief Complaint  Follow-up, Hypertension, Congestive Heart Failure, and Hyperlipidemia    Subjective            History of Present Illness  Colton Chiang is a 65-year-old male patient who presents to the office today for follow up.    History of Present Illness  The patient presents for follow-up of congestive heart failure and blood pressure management.    He has been on a long-term regimen of Lasix 40 mg daily, metoprolol 25 mg half tablet daily, and aspirin 81 mg daily, which he reports tolerating well.  He has been wearing compression socks throughout the day but removes them at night. He has experienced significant swelling in one leg, which was previously evaluated with an ultrasound and negative for DVT.      MEDICATIONS  Lasix, metoprolol, aspirin        PMH  Past Medical History:   Diagnosis Date    Allergies     Arthritis     Broken bones     Cracked    CHF (congestive heart failure) 1990    Chronic back pain     Deafness, bilateral     Depression     HBP (high blood pressure)     Heart disease     High cholesterol     History of radiation therapy     Hypothyroid 02/10/2017    Prostate cancer 02/05/2016    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, bilateral     Stroke     Thyroid disorder          ALLERGY  Allergies   Allergen Reactions    Penicillin G Hives     Reaction as a kid    Penicillins Irritability          SURGICALHX  Past Surgical History:   Procedure Laterality Date    COLONOSCOPY      HIP BIPOLAR REPLACEMENT Right 01/2025    HIP TROCHANTERIC NAILING WITH INTRAMEDULLARY HIP SCREW Right 10/30/2023    Procedure: HIP TROCHANTERIC NAILING WITH INTRAMEDULLARY HIP SCREW;  Surgeon: Sandra Wolfe MD;  Location: [facility];  Service: Orthopedics;  Laterality: Right;    PROSTATE BIOPSY      SKIN BIOPSY Right     RLE          SOC  Social History     Socioeconomic History    Marital status:    Tobacco Use    Smoking status: Every Day     Current packs/day: 1.50     Average packs/day: 2.0 packs/day for 53.3 years  (106.4 ttl pk-yrs)     Types: Cigarettes     Start date: 1/1/1972     Passive exposure: Never    Smokeless tobacco: Never   Vaping Use    Vaping status: Never Used   Substance and Sexual Activity    Alcohol use: Never    Drug use: Never    Sexual activity: Defer         FAMHX  Family History   Problem Relation Age of Onset    Breast cancer Sister     Heart disease Other     Diabetes Other         Unspecified          MEDSIGONLY  Current Outpatient Medications on File Prior to Visit   Medication Sig    Ascorbic Acid (Vitamin C) 500 MG capsule Take 1 capsule by mouth Daily.    aspirin 81 MG EC tablet Take 1 tablet by mouth Daily.    Cholecalciferol 25 MCG (1000 UT) tablet Take 1 tablet by mouth 2 (Two) Times a Day.    Coenzyme Q10 (Co Q-10) 300 MG capsule Take 1 capsule by mouth Every Other Day. (Patient taking differently: Take 1 capsule by mouth Daily.)    cyclobenzaprine (FLEXERIL) 5 MG tablet Take 1-2 tablets tid prn for muscle spasm    Ferric Maltol (ACCRUFeR) 30 MG capsule Take 1 capsule by mouth 2 (Two) Times a Day. (Patient taking differently: Take 1 capsule by mouth 2 (Two) Times a Day. Takes once daily.)    furosemide (LASIX) 40 MG tablet TAKE 1 TABLET BY MOUTH DAILY    levothyroxine (Synthroid) 137 MCG tablet TAKE 2 TABLETS BY MOUTH EVERY MORNING 30 MINUTES BEFORE BREAKFAST *INCREASED DOSE*    liothyronine (CYTOMEL) 5 MCG tablet TAKE TWO TABLETS BY MOUTH EVERY DAY    Magnesium 250 MG tablet Take 1 tablet by mouth Daily.    meloxicam (MOBIC) 15 MG tablet Take 1 tablet by mouth Daily. (Patient taking differently: Take 1 tablet by mouth Daily. As needed.)    metoprolol succinate XL (TOPROL-XL) 25 MG 24 hr tablet TAKE 1/2 TABLET DAILY    multivitamin with minerals tablet tablet Take 1 tablet by mouth Daily.    rOPINIRole (REQUIP) 0.25 MG tablet Take 1 tab 1 hour before bed. Can increase to 2 tab qhs prn for RLS    gabapentin (NEURONTIN) 100 MG capsule TAKE 1 TO 3 CAPSULES BY MOUTH DAILY AT NIGHT     No current  "facility-administered medications on file prior to visit.       Objective   /59   Pulse 64   Ht 177.8 cm (70\")   Wt 104 kg (229 lb 6.4 oz)   BMI 32.92 kg/m²       Physical Exam  Constitutional:       Appearance: He is obese.   HENT:      Head: Normocephalic.   Neck:      Vascular: No carotid bruit.   Cardiovascular:      Rate and Rhythm: Normal rate and regular rhythm.      Pulses: Normal pulses.      Heart sounds: Normal heart sounds. No murmur heard.  Pulmonary:      Effort: Pulmonary effort is normal.      Breath sounds: Normal breath sounds.   Musculoskeletal:      Cervical back: Neck supple.      Right lower leg: Edema present.      Left lower leg: Edema present.   Skin:     General: Skin is dry.   Neurological:      Mental Status: He is alert and oriented to person, place, and time.   Psychiatric:         Behavior: Behavior normal.         Result Review :   The following data was reviewed by: PRIMO Mckeon on 04/29/2025:  proBNP   Date Value Ref Range Status   10/17/2024 91.7 0.0 - 900.0 pg/mL Final     CMP          10/17/2024    09:54 3/10/2025    11:52   CMP   Glucose 88  84    BUN 15  13    Creatinine 0.92  0.96    EGFR 91.7  86.6    Sodium 134  135    Potassium 4.6  4.7    Chloride 97  95    Calcium 9.3  9.6    Total Protein 7.2  7.7    Albumin 3.8  4.0    Globulin 3.4  3.7    Total Bilirubin 0.2  <0.2    Alkaline Phosphatase 108  163    AST (SGOT) 26  33    ALT (SGPT) 32  28    Albumin/Globulin Ratio 1.1  1.1    BUN/Creatinine Ratio 16.3  13.5    Anion Gap 6.0  11.1      CBC w/diff          10/17/2024    09:54 3/10/2025    11:52   CBC w/Diff   WBC 7.58  5.52    RBC 4.72  4.50    Hemoglobin 12.7  12.1    Hematocrit 39.5  38.1    MCV 83.7  84.7    MCH 26.9  26.9    MCHC 32.2  31.8    RDW 13.3  14.0    Platelets 306  326    Neutrophil Rel % 70.0  64.3    Immature Granulocyte Rel % 0.5  0.2    Lymphocyte Rel % 18.7  20.8    Monocyte Rel % 7.5  10.0    Eosinophil Rel % 2.6  3.6    Basophil " "Rel % 0.7  1.1       Lab Results   Component Value Date    TSH 5.340 (H) 03/10/2025      Lab Results   Component Value Date    FREET4 1.55 03/10/2025      No results found for: \"DDIMERQUANT\"  Magnesium   Date Value Ref Range Status   11/01/2023 1.8 1.6 - 2.4 mg/dL Final      No results found for: \"DIGOXIN\"   No results found for: \"TROPONINT\"        Results for orders placed in visit on 12/29/22    Adult Transthoracic Echo Complete W/ Cont if Necessary Per Protocol    Interpretation Summary  Normal left ventricular systolic function.  No significant valve abnormalities noted.         Assessment and Plan    Diagnoses and all orders for this visit:    1. Chronic diastolic (congestive) heart failure (Primary)    2. Essential hypertension      Assessment & Plan  1. Congestive heart failure.  The last echocardiogram in December 2022 showed stable heart muscle function. He is currently taking Lasix 40 mg daily to prevent fluid retention, metoprolol 25 mg (half a tablet daily) to regulate heart rate and blood pressure, and aspirin 81 mg daily for coronary vessel protection. He reports tolerating these medications well. He was advised to continue wearing compression socks during the day and to monitor his fluid intake, aiming for less than 68 ounces per day. He was also advised to limit sodium intake, particularly from pantry and freezer foods, and to monitor his weight daily. A handout detailing these recommendations was provided. If he experiences a weight gain of 5 pounds or more within a 5-day period, he should take an additional dose of Lasix and contact the office for further guidance.    2. Blood pressure management.  His blood pressure is well-controlled on the current regimen of metoprolol 25 mg (half a tablet daily). He was advised to continue this medication and monitor his blood pressure regularly.      Follow-up  The patient will follow up in 6 months or sooner if necessary.      Follow Up   Return in about 6 " months (around 10/29/2025) for Follow up with Dr Concepcion.    Patient was given instructions and counseling regarding his condition or for health maintenance advice. Please see specific information pulled into the AVS if appropriate.            Patient or patient representative verbalized consent for the use of Ambient Listening during the visit with  PRIMO Mckeon for chart documentation. 5/6/2025  04:53 EDT    PRIMO Mckeon  04/29/25  08:23 EDT    Dictated Utilizing Dragon Dictation

## 2025-05-19 ENCOUNTER — OFFICE VISIT (OUTPATIENT)
Dept: FAMILY MEDICINE CLINIC | Facility: CLINIC | Age: 65
End: 2025-05-19
Payer: MEDICARE

## 2025-05-19 VITALS
SYSTOLIC BLOOD PRESSURE: 135 MMHG | TEMPERATURE: 98.1 F | HEIGHT: 70 IN | RESPIRATION RATE: 18 BRPM | DIASTOLIC BLOOD PRESSURE: 69 MMHG | WEIGHT: 235.2 LBS | OXYGEN SATURATION: 97 % | HEART RATE: 62 BPM | BODY MASS INDEX: 33.67 KG/M2

## 2025-05-19 DIAGNOSIS — Z12.11 ENCOUNTER FOR SCREENING FOR MALIGNANT NEOPLASM OF COLON: ICD-10-CM

## 2025-05-19 DIAGNOSIS — E03.9 ACQUIRED HYPOTHYROIDISM: Chronic | ICD-10-CM

## 2025-05-19 DIAGNOSIS — E66.09 CLASS 1 OBESITY DUE TO EXCESS CALORIES WITH SERIOUS COMORBIDITY AND BODY MASS INDEX (BMI) OF 33.0 TO 33.9 IN ADULT: Chronic | ICD-10-CM

## 2025-05-19 DIAGNOSIS — F17.219 CIGARETTE NICOTINE DEPENDENCE WITH NICOTINE-INDUCED DISORDER: Chronic | ICD-10-CM

## 2025-05-19 DIAGNOSIS — I10 ESSENTIAL HYPERTENSION: Chronic | ICD-10-CM

## 2025-05-19 DIAGNOSIS — D50.9 IRON DEFICIENCY ANEMIA, UNSPECIFIED IRON DEFICIENCY ANEMIA TYPE: Chronic | ICD-10-CM

## 2025-05-19 DIAGNOSIS — E78.5 HYPERLIPIDEMIA LDL GOAL <100: Chronic | ICD-10-CM

## 2025-05-19 DIAGNOSIS — G25.81 RLS (RESTLESS LEGS SYNDROME): Chronic | ICD-10-CM

## 2025-05-19 DIAGNOSIS — M15.0 PRIMARY OSTEOARTHRITIS INVOLVING MULTIPLE JOINTS: Primary | Chronic | ICD-10-CM

## 2025-05-19 DIAGNOSIS — E66.811 CLASS 1 OBESITY DUE TO EXCESS CALORIES WITH SERIOUS COMORBIDITY AND BODY MASS INDEX (BMI) OF 33.0 TO 33.9 IN ADULT: Chronic | ICD-10-CM

## 2025-05-19 PROBLEM — Z00.00 MEDICARE ANNUAL WELLNESS VISIT, SUBSEQUENT: Status: RESOLVED | Noted: 2022-06-10 | Resolved: 2025-05-19

## 2025-05-19 PROCEDURE — G2211 COMPLEX E/M VISIT ADD ON: HCPCS | Performed by: FAMILY MEDICINE

## 2025-05-19 PROCEDURE — 1159F MED LIST DOCD IN RCRD: CPT | Performed by: FAMILY MEDICINE

## 2025-05-19 PROCEDURE — 99214 OFFICE O/P EST MOD 30 MIN: CPT | Performed by: FAMILY MEDICINE

## 2025-05-19 PROCEDURE — 1160F RVW MEDS BY RX/DR IN RCRD: CPT | Performed by: FAMILY MEDICINE

## 2025-05-19 PROCEDURE — 3075F SYST BP GE 130 - 139MM HG: CPT | Performed by: FAMILY MEDICINE

## 2025-05-19 PROCEDURE — 1125F AMNT PAIN NOTED PAIN PRSNT: CPT | Performed by: FAMILY MEDICINE

## 2025-05-19 PROCEDURE — 3078F DIAST BP <80 MM HG: CPT | Performed by: FAMILY MEDICINE

## 2025-05-19 RX ORDER — HYDROCODONE BITARTRATE AND ACETAMINOPHEN 10; 325 MG/1; MG/1
1 TABLET ORAL EVERY 6 HOURS PRN
Qty: 12 TABLET | Refills: 0 | Status: SHIPPED | OUTPATIENT
Start: 2025-05-19

## 2025-05-19 RX ORDER — MELOXICAM 15 MG/1
15 TABLET ORAL DAILY
Qty: 90 TABLET | Refills: 1 | Status: SHIPPED | OUTPATIENT
Start: 2025-05-19

## 2025-05-19 RX ORDER — ROPINIROLE 0.25 MG/1
TABLET, FILM COATED ORAL
Qty: 90 TABLET | Refills: 5 | Status: SHIPPED | OUTPATIENT
Start: 2025-05-19

## 2025-05-19 RX ORDER — FERRIC MALTOL 30 MG/1
1 CAPSULE ORAL 2 TIMES DAILY
Qty: 60 CAPSULE | Refills: 5 | Status: SHIPPED | OUTPATIENT
Start: 2025-05-19

## 2025-05-19 NOTE — ASSESSMENT & PLAN NOTE
Tobacco use is unchanged.  Smoking cessation counseling was provided.  Tobacco use will be reassessed in 6 months.

## 2025-05-19 NOTE — ASSESSMENT & PLAN NOTE
Patient's (Body mass index is 33.75 kg/m².) indicates that they are obese (BMI >30) with health conditions that include hypertension and dyslipidemias . Weight is worsening. BMI  is above average; BMI management plan is completed. We discussed low calorie, low carb based diet program, portion control, and increasing exercise.

## 2025-05-19 NOTE — PROGRESS NOTES
"Chief Complaint  Gait Problem (Pt continues to have issue with walking after R hip operations. Pt continues to see PT three times weekly.) and Prostate Cancer (Pt is followed by Dr. Joel. )    Subjective        Colton Chiang presents to [facility]  History of Present Illness  The patient is here today for a follow-up for the management of his acute and chronic medical conditions.  He is  with one daughter.  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 He has a family history of breast cancer.     The patient is still continuing to smoke.     The patient is continue to take 2-5 mcg Cytomel and 2-125 mcg levothyroxine daily.  His most recent TSH on 8/9/22 was found to be 1.08.  He denies feeling any more fatigued than he usually does.  The patient works a lot of hours and stays tired a lot.     He does not check his blood pressure at home. He is followed by cardiology. He states his blood pressure is normally 90-100s/80s.     His right lower leg swelling, pain and redness is unchanged. He has been taking an extra 20 mg of lasix every other day to help with his swelling. He does not like compression stockings.     He is having low back pain at night and worsening RLS symptoms. He is taking ropinirole 0.5 mg nightly and it is helping.     The patient has no other complaints today and denies chest pain, shortness of breath, weakness, numbness, nausea, vomiting, diarrhea, dizziness or syncopal event.            Objective   Vital Signs:  /69 (BP Location: Left arm, Patient Position: Sitting, Cuff Size: Adult)   Pulse 62   Temp 98.1 °F (36.7 °C) (Temporal)   Resp 18   Ht 177.8 cm (70\")   Wt 107 kg (235 lb 3.2 oz)   SpO2 97%   BMI 33.75 kg/m²   Estimated body mass index is 33.75 " "kg/m² as calculated from the following:    Height as of this encounter: 177.8 cm (70\").    Weight as of this encounter: 107 kg (235 lb 3.2 oz).            Physical Exam  Vitals reviewed.   Constitutional:       Appearance: He is well-developed. He is obese.   HENT:      Head: Normocephalic and atraumatic.      Right Ear: External ear normal.      Left Ear: External ear normal.      Mouth/Throat:      Pharynx: No oropharyngeal exudate.   Eyes:      Conjunctiva/sclera: Conjunctivae normal.      Pupils: Pupils are equal, round, and reactive to light.   Neck:      Vascular: No carotid bruit.   Cardiovascular:      Rate and Rhythm: Normal rate and regular rhythm.      Heart sounds: No murmur heard.     No friction rub. No gallop.   Pulmonary:      Effort: Pulmonary effort is normal.      Breath sounds: Normal breath sounds. No wheezing or rhonchi.   Abdominal:      General: There is no distension.   Skin:     General: Skin is warm and dry.   Neurological:      Mental Status: He is alert and oriented to person, place, and time.      Cranial Nerves: No cranial nerve deficit.      Motor: No weakness.   Psychiatric:         Mood and Affect: Mood and affect normal.         Behavior: Behavior normal.         Thought Content: Thought content normal.         Judgment: Judgment normal.        Result Review :    CMP          10/17/2024    09:54 3/10/2025    11:52   CMP   Glucose 88  84    BUN 15  13    Creatinine 0.92  0.96    EGFR 91.7  86.6    Sodium 134  135    Potassium 4.6  4.7    Chloride 97  95    Calcium 9.3  9.6    Total Protein 7.2  7.7    Albumin 3.8  4.0    Globulin 3.4  3.7    Total Bilirubin 0.2  <0.2    Alkaline Phosphatase 108  163    AST (SGOT) 26  33    ALT (SGPT) 32  28    Albumin/Globulin Ratio 1.1  1.1    BUN/Creatinine Ratio 16.3  13.5    Anion Gap 6.0  11.1      CBC          6/7/2024    09:41 10/17/2024    09:54 3/10/2025    11:52   CBC   WBC 5.69  7.58  5.52    RBC 4.83  4.72  4.50    Hemoglobin 13.3  12.7  " 12.1    Hematocrit 41.0  39.5  38.1    MCV 84.9  83.7  84.7    MCH 27.5  26.9  26.9    MCHC 32.4  32.2  31.8    RDW 12.8  13.3  14.0    Platelets 248  306  326      Lipid Panel          6/7/2024    09:45 10/17/2024    09:54   Lipid Panel   Total Cholesterol 175  154    Triglycerides 62  59    HDL Cholesterol 39  36    VLDL Cholesterol 12  12    LDL Cholesterol  124  106    LDL/HDL Ratio 3.17  2.95      TSH          6/7/2024    09:41 10/17/2024    09:54 3/10/2025    11:52   TSH   TSH 6.540  2.800  5.340                Assessment and Plan   Diagnoses and all orders for this visit:    1. Primary osteoarthritis involving multiple joints (Primary)  Assessment & Plan:  The patient is Vane symptoms are worsening.  He is having some breakthrough pain that the Mobic is not controlled at nighttime occasionally.  He does take one half of Norco on the worst nights and they do help.  He is not interested in being on chronic opioid pain medication because of fear of addiction.    Orders:  -     meloxicam (MOBIC) 15 MG tablet; Take 1 tablet by mouth Daily. As needed.  Dispense: 90 tablet; Refill: 1  -     HYDROcodone-acetaminophen (NORCO)  MG per tablet; Take 1 tablet by mouth Every 6 (Six) Hours As Needed for Moderate Pain.  Dispense: 12 tablet; Refill: 0    2. Class 1 obesity due to excess calories with serious comorbidity and body mass index (BMI) of 33.0 to 33.9 in adult  Assessment & Plan:  Patient's (Body mass index is 33.75 kg/m².) indicates that they are obese (BMI >30) with health conditions that include hypertension and dyslipidemias . Weight is worsening. BMI  is above average; BMI management plan is completed. We discussed low calorie, low carb based diet program, portion control, and increasing exercise.       3. Essential hypertension  Assessment & Plan:  Hypertension is stable and controlled  Continue current treatment regimen.  Dietary sodium restriction.  Weight loss.  Blood pressure will be reassessed in  6 months.      4. Hyperlipidemia LDL goal <100  Assessment & Plan:   Lipid abnormalities are stable    Plan:  Continue same medication/s without change.      Discussed medication dosage, use, side effects, and goals of treatment in detail.    Counseled patient on lifestyle modifications to help control hyperlipidemia.   Cholesterol lowering dietary information shared with patient.    Patient Treatment Goals:   LDL goal is under 100    Followup in 6 months.      5. Acquired hypothyroidism  Assessment & Plan:  TSH elevated and free T4 low normal. Increased levothyroxine to 137 x 2 daily. Labs in 6 months.       6. RLS (restless legs syndrome)  Assessment & Plan:  The patient's ropinirole 0.5 mg nightly was increased to 0.75 mg nightly.  Will see him back in 6 months.    Orders:  -     rOPINIRole (REQUIP) 0.25 MG tablet; Take 3 tablets nightly  Dispense: 90 tablet; Refill: 5    7. Encounter for screening for malignant neoplasm of colon  -     Cologuard - Stool, Per Rectum; Future    8. Cigarette nicotine dependence with nicotine-induced disorder  Assessment & Plan:  Tobacco use is unchanged.  Smoking cessation counseling was provided.  Tobacco use will be reassessed in 6 months.                9. Iron deficiency anemia, unspecified iron deficiency anemia type  -     Ferric Maltol (ACCRUFeR) 30 MG capsule; Take 1 capsule by mouth 2 (Two) Times a Day.  Dispense: 60 capsule; Refill: 5        Assessment & Plan             Follow Up   Return in about 6 months (around 11/19/2025).  Patient was given instructions and counseling regarding his condition or for health maintenance advice. Please see specific information pulled into the AVS if appropriate.         Patient or patient representative verbalized consent for the use of Ambient Listening during the visit with  Eduarda Butt DO for chart documentation. 5/19/2025  08:05 EDT

## 2025-05-19 NOTE — ASSESSMENT & PLAN NOTE
The patient is Vane symptoms are worsening.  He is having some breakthrough pain that the Mobic is not controlled at nighttime occasionally.  He does take one half of Norco on the worst nights and they do help.  He is not interested in being on chronic opioid pain medication because of fear of addiction.

## 2025-05-19 NOTE — ASSESSMENT & PLAN NOTE
The patient's ropinirole 0.5 mg nightly was increased to 0.75 mg nightly.  Will see him back in 6 months.

## 2025-06-23 DIAGNOSIS — I10 ESSENTIAL HYPERTENSION: ICD-10-CM

## 2025-06-23 RX ORDER — METOPROLOL SUCCINATE 25 MG/1
12.5 TABLET, EXTENDED RELEASE ORAL DAILY
Qty: 45 TABLET | Refills: 2 | Status: SHIPPED | OUTPATIENT
Start: 2025-06-23

## 2025-08-21 ENCOUNTER — TELEPHONE (OUTPATIENT)
Dept: CARDIOLOGY | Facility: CLINIC | Age: 65
End: 2025-08-21
Payer: MEDICARE

## (undated) DEVICE — KENDALL SCD EXPRESS SLEEVES, KNEE LENGTH, MEDIUM: Brand: KENDALL SCD

## (undated) DEVICE — GLV SURG BIOGEL LTX PF 8 1/2

## (undated) DEVICE — GAUZE,SPONGE,4"X4",16PLY,STRL,LF,10/TRAY: Brand: MEDLINE

## (undated) DEVICE — SUT VIC 2/0 CT1 36IN

## (undated) DEVICE — PAD GRND REM POLYHESIVE A/ DISP

## (undated) DEVICE — GLV SURG SENSICARE PI PF LF 7 GRN STRL

## (undated) DEVICE — INTENDED FOR TISSUE SEPARATION, AND OTHER PROCEDURES THAT REQUIRE A SHARP SURGICAL BLADE TO PUNCTURE OR CUT.: Brand: BARD-PARKER ® CARBON RIB-BACK BLADES

## (undated) DEVICE — GLV SURG SENSICARE SLT PF LF 7 STRL

## (undated) DEVICE — GLV SURG SENSICARE SLT PF LF 8.5 STRL

## (undated) DEVICE — CVR LEG BOOTLEG F/R NOSKID 33IN

## (undated) DEVICE — APPL CHLORAPREP HI/LITE 26ML ORNG

## (undated) DEVICE — ANTIBACTERIAL VIOLET BRAIDED (POLYGLACTIN 910), SYNTHETIC ABSORBABLE SUTURE: Brand: COATED VICRYL

## (undated) DEVICE — DRSNG SURESITE WNDW 4X4.5

## (undated) DEVICE — PENCL E/S SMOKEEVAC W/TELESCP CANN

## (undated) DEVICE — GOWN IMPERV OPN/BK KNT/CUF XXL

## (undated) DEVICE — GUIDEPIN TEMP THRD 3.2X508MM DISP

## (undated) DEVICE — MINOR-LF: Brand: MEDLINE INDUSTRIES, INC.

## (undated) DEVICE — MAT FLR ABS W/BLU/LINER 56X72IN WHT

## (undated) DEVICE — PROXIMATE RH ROTATING HEAD SKIN STAPLERS (35 WIDE) CONTAINS 35 STAINLESS STEEL STAPLES: Brand: PROXIMATE

## (undated) DEVICE — 6617 IOBAN II PATIENT ISOLATION DRAPE 5/BX,4BX/CS: Brand: STERI-DRAPE™ IOBAN™ 2

## (undated) DEVICE — Device

## (undated) DEVICE — DRSNG GZ PETROLTM XEROFORM CURAD 1X8IN STRL

## (undated) DEVICE — 3M™ STERI-DRAPE™ X-RAY IMAGE INTENSIFIER DRAPE, 10 PER CARTON / 4 CARTONS PER CASE, 1013: Brand: STERI-DRAPE™

## (undated) DEVICE — BNDG COBAN S/ADHR WRP LF 6IN 5YD TN

## (undated) DEVICE — GW TIB BALL NOSE 3MM 100CM